# Patient Record
Sex: MALE | Race: WHITE | NOT HISPANIC OR LATINO | Employment: FULL TIME | URBAN - METROPOLITAN AREA
[De-identification: names, ages, dates, MRNs, and addresses within clinical notes are randomized per-mention and may not be internally consistent; named-entity substitution may affect disease eponyms.]

---

## 2017-01-04 ENCOUNTER — TELEPHONE (OUTPATIENT)
Dept: SURGERY | Facility: CLINIC | Age: 52
End: 2017-01-04

## 2017-01-04 NOTE — TELEPHONE ENCOUNTER
Patient is needing to reschedule his surgery to 1/31/17. Called [facility]X Scheduling l/m for return call from Linda or Kamari .

## 2017-01-04 NOTE — TELEPHONE ENCOUNTER
----- Message from Bob Lopez sent at 1/4/2017 10:28 AM CST -----  Pt needs to schedule surgery date that was cancelled. Please call pt regarding this at [phone]

## 2017-01-30 ENCOUNTER — TELEPHONE (OUTPATIENT)
Dept: SURGERY | Facility: CLINIC | Age: 52
End: 2017-01-30

## 2017-01-30 ENCOUNTER — ANESTHESIA EVENT (OUTPATIENT)
Dept: SURGERY | Facility: HOSPITAL | Age: 52
End: 2017-01-30
Payer: COMMERCIAL

## 2017-01-30 NOTE — ANESTHESIA PREPROCEDURE EVALUATION
01/30/2017      Pre-operative evaluation for Procedure(s) (LRB):  REPAIR-HERNIA-UMBILICAL (N/A)    Gee Montez is a 51 y.o. male with PMH chronic neck pain, and frequent PVCs who is being evaluated for the above procedure.     Patient Active Problem List   Diagnosis    Frequent PVCs    Hyperlipidemia    Chronic neck pain       Review of patient's allergies indicates:   Allergen Reactions    Aspirin      Hurts stomach        No current facility-administered medications on file prior to encounter.      Current Outpatient Prescriptions on File Prior to Encounter   Medication Sig Dispense Refill    atorvastatin (LIPITOR) 10 MG tablet Take 1 tablet (10 mg total) by mouth once daily. 90 tablet 11    BYSTOLIC 5 mg Tab Take 1 tablet (5 mg total) by mouth every evening. 90 tablet 11    neomycin-polymyxin-dexamethasone (DEXACINE) 3.5 mg/g-10,000 unit/g-0.1 % Oint ONE APPLICATION TO THE LEFT EYE 4 TIMES A DAY  1    tramadol (ULTRAM) 50 mg tablet TAKE 1 TABLET BY MOUTH EVERY 8 HOURS AS NEEDED PAIN 60 tablet 4       No past surgical history on file.    Social History     Social History    Marital status: Single     Spouse name: N/A    Number of children: N/A    Years of education: N/A     Occupational History    Not on file.     Social History Main Topics    Smoking status: Former Smoker    Smokeless tobacco: Not on file    Alcohol use Yes      Comment: 2-3 drinks on weekend days, 5 drinks per week    Drug use: Not on file    Sexual activity: Not on file     Other Topics Concern    Not on file     Social History Narrative    Lives with partner 27 years. No kids. Works [employer]. Walks dog for exercise.         Vital Signs Range (Last 24H):         CBC: No results for input(s): WBC, RBC, HGB, HCT, PLT, MCV, MCH, MCHC in the last 72 hours.    CMP: No results for input(s): NA, K, CL, CO2, BUN,  CREATININE, GLU, MG, PHOS, CALCIUM, ALBUMIN, PROT, ALKPHOS, ALT, AST, BILITOT in the last 72 hours.    INR  No results for input(s): INR, PROTIME, APTT in the last 72 hours.    Invalid input(s): PT        Diagnostic Studies:      EKG:  Vent. Rate : 054 BPM     Atrial Rate : 054 BPM     P-R Int : 162 ms          QRS Dur : 094 ms      QT Int : 418 ms       P-R-T Axes : 018 -19 051 degrees     QTc Int : 396 ms    Sinus bradycardia  Within normal limits    No previous ECGs available  Confirmed by HAYLEE TOBIN MD ([id number]) on 12/1/2016 9:58:47 AM    2D Echo: none on file           [facility] Anesthesia Evaluation    I have reviewed the Patient Summary Reports.     I have reviewed the Medications.     Review of Systems      Physical Exam  General:  Well nourished, Obesity    Airway/Jaw/Neck:  Airway Findings: Mouth Opening: Normal Tongue: Normal  General Airway Assessment: Adult  Mallampati: II  TM Distance: Normal, at least 6 cm      Dental:  Dental Findings: In tact        Mental Status:  Mental Status Findings:  Cooperative         Anesthesia Plan  Type of Anesthesia, risks & benefits discussed:  Anesthesia Type:  general  Patient's Preference:   Intra-op Monitoring Plan: standard ASA monitors  Intra-op Monitoring Plan Comments:   Post Op Pain Control Plan:   Post Op Pain Control Plan Comments:   Induction:   IV  Beta Blocker:  Patient is on a Beta-Blocker and has received one dose within the past 24 hours (No further documentation required).       Informed Consent: Patient understands risks and agrees with Anesthesia plan.  Questions answered. Anesthesia consent signed with patient.  ASA Score: 2     Day of Surgery Review of History & Physical:            Ready For Surgery From Anesthesia Perspective.

## 2017-01-30 NOTE — TELEPHONE ENCOUNTER
Left  notifying pt that his surgery is scheduled for 1100 on 1/31/17. he needs to arrive to the 2nd floor [facility] in the hospital @ 0900. he should not eat or drink anything after midnight. Post op Appointment reminder mailed.

## 2017-01-31 ENCOUNTER — ANESTHESIA (OUTPATIENT)
Dept: SURGERY | Facility: HOSPITAL | Age: 52
End: 2017-01-31
Payer: COMMERCIAL

## 2017-01-31 ENCOUNTER — SURGERY (OUTPATIENT)
Age: 52
End: 2017-01-31

## 2017-01-31 PROBLEM — K42.9 UMBILICAL HERNIA WITHOUT OBSTRUCTION AND WITHOUT GANGRENE: Status: ACTIVE | Noted: 2017-01-31

## 2017-01-31 PROCEDURE — 27200750 HC INSULATED NEEDLE/ STIMUPLEX: Performed by: STUDENT IN AN ORGANIZED HEALTH CARE EDUCATION/TRAINING PROGRAM

## 2017-01-31 PROCEDURE — 25000003 PHARM REV CODE 250: Performed by: ANESTHESIOLOGY

## 2017-01-31 PROCEDURE — D9220A PRA ANESTHESIA: Mod: ,,, | Performed by: ANESTHESIOLOGY

## 2017-01-31 PROCEDURE — 63600175 PHARM REV CODE 636 W HCPCS: Performed by: ANESTHESIOLOGY

## 2017-01-31 PROCEDURE — 25000003 PHARM REV CODE 250: Performed by: SURGERY

## 2017-01-31 RX ORDER — ONDANSETRON 2 MG/ML
INJECTION INTRAMUSCULAR; INTRAVENOUS
Status: DISCONTINUED | OUTPATIENT
Start: 2017-01-31 | End: 2017-01-31

## 2017-01-31 RX ORDER — ROCURONIUM BROMIDE 10 MG/ML
INJECTION, SOLUTION INTRAVENOUS
Status: DISCONTINUED | OUTPATIENT
Start: 2017-01-31 | End: 2017-01-31

## 2017-01-31 RX ORDER — MIDAZOLAM HYDROCHLORIDE 1 MG/ML
INJECTION, SOLUTION INTRAMUSCULAR; INTRAVENOUS
Status: DISCONTINUED | OUTPATIENT
Start: 2017-01-31 | End: 2017-01-31

## 2017-01-31 RX ORDER — PHENYLEPHRINE HYDROCHLORIDE 10 MG/ML
INJECTION INTRAVENOUS
Status: DISCONTINUED | OUTPATIENT
Start: 2017-01-31 | End: 2017-01-31

## 2017-01-31 RX ORDER — NEOSTIGMINE METHYLSULFATE 1 MG/ML
INJECTION, SOLUTION INTRAVENOUS
Status: DISCONTINUED | OUTPATIENT
Start: 2017-01-31 | End: 2017-01-31

## 2017-01-31 RX ORDER — PROPOFOL 10 MG/ML
VIAL (ML) INTRAVENOUS
Status: DISCONTINUED | OUTPATIENT
Start: 2017-01-31 | End: 2017-01-31

## 2017-01-31 RX ORDER — SODIUM CHLORIDE 9 MG/ML
INJECTION, SOLUTION INTRAVENOUS CONTINUOUS PRN
Status: DISCONTINUED | OUTPATIENT
Start: 2017-01-31 | End: 2017-01-31

## 2017-01-31 RX ORDER — GLYCOPYRROLATE 0.2 MG/ML
INJECTION INTRAMUSCULAR; INTRAVENOUS
Status: DISCONTINUED | OUTPATIENT
Start: 2017-01-31 | End: 2017-01-31

## 2017-01-31 RX ORDER — FENTANYL CITRATE 50 UG/ML
INJECTION, SOLUTION INTRAMUSCULAR; INTRAVENOUS
Status: DISCONTINUED | OUTPATIENT
Start: 2017-01-31 | End: 2017-01-31

## 2017-01-31 RX ADMIN — Medication 2 G: at 11:01

## 2017-01-31 RX ADMIN — ROCURONIUM BROMIDE 50 MG: 10 INJECTION, SOLUTION INTRAVENOUS at 10:01

## 2017-01-31 RX ADMIN — GLYCOPYRROLATE 0.2 MG: 0.2 INJECTION, SOLUTION INTRAMUSCULAR; INTRAVENOUS at 11:01

## 2017-01-31 RX ADMIN — BACITRACIN 50000 UNITS: 50000 INJECTION, POWDER, LYOPHILIZED, FOR SOLUTION INTRAMUSCULAR at 11:01

## 2017-01-31 RX ADMIN — PHENYLEPHRINE HYDROCHLORIDE 200 MCG: 10 INJECTION INTRAVENOUS at 11:01

## 2017-01-31 RX ADMIN — SODIUM CHLORIDE: 0.9 INJECTION, SOLUTION INTRAVENOUS at 10:01

## 2017-01-31 RX ADMIN — ONDANSETRON 4 MG: 2 INJECTION INTRAMUSCULAR; INTRAVENOUS at 12:01

## 2017-01-31 RX ADMIN — PROPOFOL 50 MG: 10 INJECTION, EMULSION INTRAVENOUS at 11:01

## 2017-01-31 RX ADMIN — FENTANYL CITRATE 25 MCG: 50 INJECTION, SOLUTION INTRAMUSCULAR; INTRAVENOUS at 12:01

## 2017-01-31 RX ADMIN — PROPOFOL 200 MG: 10 INJECTION, EMULSION INTRAVENOUS at 10:01

## 2017-01-31 RX ADMIN — NEOSTIGMINE METHYLSULFATE 5 MG: 1 INJECTION INTRAVENOUS at 12:01

## 2017-01-31 RX ADMIN — MIDAZOLAM HYDROCHLORIDE 2 MG: 1 INJECTION, SOLUTION INTRAMUSCULAR; INTRAVENOUS at 10:01

## 2017-01-31 RX ADMIN — FENTANYL CITRATE 150 MCG: 50 INJECTION, SOLUTION INTRAMUSCULAR; INTRAVENOUS at 10:01

## 2017-01-31 RX ADMIN — GLYCOPYRROLATE 0.6 MG: 0.2 INJECTION, SOLUTION INTRAMUSCULAR; INTRAVENOUS at 12:01

## 2017-01-31 NOTE — ANESTHESIA POSTPROCEDURE EVALUATION
"Anesthesia Post Evaluation    Patient: Gee Montez    Procedure(s) Performed: Procedure(s) (LRB):  REPAIR-HERNIA-UMBILICAL (N/A)    Final Anesthesia Type: general  Patient location during evaluation: PACU  Patient participation: Yes- Able to Participate  Level of consciousness: awake and alert  Post-procedure vital signs: reviewed and stable  Pain management: adequate  Airway patency: patent  PONV status at discharge: No PONV  Anesthetic complications: no      Cardiovascular status: hemodynamically stable  Respiratory status: unassisted, spontaneous ventilation and room air  Hydration status: euvolemic  Follow-up not needed.        Visit Vitals    /71    Pulse (!) 57    Temp 36.6 °C (97.8 °F) (Oral)    Resp 15    Ht 5' 10" (1.778 m)    Wt 95.7 kg (210 lb 15.7 oz)    SpO2 99%    BMI 30.27 kg/m2       Pain/Tyree Score: Pain Assessment Performed: Yes (1/31/2017  1:10 PM)  Presence of Pain: complains of pain/discomfort (1/31/2017  1:10 PM)  Pain Rating Prior to Med Admin: 5 (1/31/2017  1:04 PM)  Tyree Score: 10 (1/31/2017  1:10 PM)      "

## 2017-01-31 NOTE — TRANSFER OF CARE
"Anesthesia Transfer of Care Note    Patient: Gee Montez    Procedure(s) Performed: Procedure(s) (LRB):  REPAIR-HERNIA-UMBILICAL (N/A)    Patient location: PACU    Anesthesia Type: general    Transport from OR: Transported from OR on 6-10 L/min O2 by face mask with adequate spontaneous ventilation    Post pain: adequate analgesia    Post assessment: no apparent anesthetic complications    Post vital signs: stable    Level of consciousness: awake    Nausea/Vomiting: no nausea/vomiting    Complications: none          Last vitals:   Visit Vitals    /75 (BP Location: Left arm, Patient Position: Lying, BP Method: Automatic)    Pulse 62    Temp 36.8 °C (98.2 °F) (Oral)    Resp 16    Ht 5' 10" (1.778 m)    Wt 95.7 kg (210 lb 15.7 oz)    SpO2 98%    BMI 30.27 kg/m2     "

## 2017-01-31 NOTE — ANESTHESIA PROCEDURE NOTES
Rectus Sheath Single Shot     Patient location during procedure: OR   Block not for primary anesthetic.  Reason for block: at surgeon's request and post-op pain management   Post-op Pain Location: Abdominal wall pain   Start time: 1/31/2017 10:55 AM  Timeout: 1/31/2017 10:55 AM   End time: 1/31/2017 11:00 AM  Surgery related to: Umbilical hernia repair.   Staffing  Anesthesiologist: MIRZA KIDD  Resident/CRNA: BHAVYA SOUSA  Performed by: resident/CRNA   Preanesthetic Checklist  Completed: patient identified, site marked, surgical consent, pre-op evaluation, timeout performed, IV checked, risks and benefits discussed and monitors and equipment checked  Peripheral Block  Patient position: supine  Prep: ChloraPrep  Patient monitoring: heart rate, cardiac monitor, continuous pulse ox, continuous capnometry and frequent blood pressure checks  Block type: rectus sheath  Laterality: bilateral  Injection technique: single shot  Needle  Needle type: Stimuplex   Needle gauge: 21 G  Needle length: 4 in  Needle localization: ultrasound guidance   -ultrasound image captured on disc.  Assessment  Injection assessment: local visualized surrounding nerve and negative aspiration  Paresthesia pain: none  Heart rate change: no  Slow fractionated injection: yes  Medications:  Bolus administered: 40 mL of 0.25 ropivacaine  Epinephrine added: 3.75 mcg/mL (1/300,000)  Additional Notes  See anesthesia record for continuous monitoring. VSS, patient tolerated procedure well.

## 2017-01-31 NOTE — ANESTHESIA RELEASE NOTE
"Anesthesia Release from PACU Note    Patient: Gee Montez    Procedure(s) Performed: Procedure(s) (LRB):  REPAIR-HERNIA-UMBILICAL (N/A)    Anesthesia type: general    Post pain: Adequate analgesia    Post assessment: no apparent anesthetic complications    Last Vitals:   Visit Vitals    /71    Pulse (!) 57    Temp 36.6 °C (97.8 °F) (Oral)    Resp 15    Ht 5' 10" (1.778 m)    Wt 95.7 kg (210 lb 15.7 oz)    SpO2 99%    BMI 30.27 kg/m2       Post vital signs: stable    Level of consciousness: awake    Nausea/Vomiting: no nausea/no vomiting    Complications: none    Airway Patency: patent    Respiratory: unassisted    Cardiovascular: stable and blood pressure at baseline    Hydration: euvolemic  "

## 2017-02-03 ENCOUNTER — NURSE TRIAGE (OUTPATIENT)
Dept: ADMINISTRATIVE | Facility: CLINIC | Age: 52
End: 2017-02-03

## 2017-02-03 NOTE — TELEPHONE ENCOUNTER
Reason for Disposition   Nursing judgment, per information in Reference    Protocols used: ST NO GUIDELINE AVAILABLE - ADVICE PER [id number]    Patient states he has a tickle in the back of his throat. No complaint of pain, choking or difficulty swallowing. Patient states he has been using cough drops and drinking water to try to clear his throat. Patient wanted to know if it is ok to use cough drops and informed him that it should be okay. Patient advised to call back if he has any new symptoms or difficulties, he verbalized understanding.

## 2017-02-15 ENCOUNTER — OFFICE VISIT (OUTPATIENT)
Dept: SURGERY | Facility: CLINIC | Age: 52
End: 2017-02-15
Payer: COMMERCIAL

## 2017-02-15 VITALS
SYSTOLIC BLOOD PRESSURE: 125 MMHG | WEIGHT: 210 LBS | HEIGHT: 70 IN | BODY MASS INDEX: 30.06 KG/M2 | DIASTOLIC BLOOD PRESSURE: 83 MMHG | TEMPERATURE: 98 F | HEART RATE: 57 BPM

## 2017-02-15 DIAGNOSIS — Z09 POSTOP CHECK: Primary | ICD-10-CM

## 2017-02-15 PROCEDURE — 99999 PR PBB SHADOW E&M-EST. PATIENT-LVL II: CPT | Mod: PBBFAC,,, | Performed by: SURGERY

## 2017-02-15 PROCEDURE — 99024 POSTOP FOLLOW-UP VISIT: CPT | Mod: S$GLB,,, | Performed by: SURGERY

## 2017-02-15 NOTE — PROGRESS NOTES
HPI:  The patient is status post-umbilical hernia on 1/31/17. He is eating well. The patient denies nausea, fever or problems with the bowels.     PHYSICAL EXAM:  Physical Exam   Abd soft, NTTP, incision healing well    ASSESSMENT:    The patient is doing well after surgery.     PLAN:    Follow up PRN.  Activity: light duty for 4 weeks

## 2017-03-06 DIAGNOSIS — M54.2 CHRONIC NECK PAIN: ICD-10-CM

## 2017-03-06 DIAGNOSIS — G89.29 CHRONIC NECK PAIN: ICD-10-CM

## 2017-03-06 RX ORDER — TRAMADOL HYDROCHLORIDE 50 MG/1
TABLET ORAL
Qty: 60 TABLET | Refills: 0 | Status: CANCELLED | OUTPATIENT
Start: 2017-03-06

## 2017-03-09 DIAGNOSIS — M54.2 CHRONIC NECK PAIN: ICD-10-CM

## 2017-03-09 DIAGNOSIS — G89.29 CHRONIC NECK PAIN: ICD-10-CM

## 2017-03-09 RX ORDER — TRAMADOL HYDROCHLORIDE 50 MG/1
50 TABLET ORAL EVERY 8 HOURS PRN
Qty: 90 TABLET | Refills: 3 | Status: SHIPPED | OUTPATIENT
Start: 2017-03-09 | End: 2017-07-07 | Stop reason: SDUPTHER

## 2017-05-04 ENCOUNTER — TELEPHONE (OUTPATIENT)
Dept: INTERNAL MEDICINE | Facility: CLINIC | Age: 52
End: 2017-05-04

## 2017-05-04 DIAGNOSIS — I49.3 FREQUENT PVCS: Primary | ICD-10-CM

## 2017-05-04 NOTE — TELEPHONE ENCOUNTER
Patient requesting a Prior Authorization for Bystolic. Insurance requiring PA to cover some cost. Callled [employer] [phone] and an automated message came on stating they switched to electronic prior Authorizations. The website given is [email or web address] Please advise.

## 2017-05-04 NOTE — TELEPHONE ENCOUNTER
----- Message from Yee May sent at 5/4/2017  1:41 PM CDT -----  Contact: Patient  The patient's Bystolic is too expensive.  His insurance told him that if his physician would call and give a prior authorization the cost of the Bystolic would go down. Please call [employer] at [phone] for the prior authorization.  The direct physician's line if Dr. Arredondo has any questions is [phone].    Thanks!

## 2017-05-05 ENCOUNTER — TELEPHONE (OUTPATIENT)
Dept: INTERNAL MEDICINE | Facility: CLINIC | Age: 52
End: 2017-05-05

## 2017-05-05 RX ORDER — METOPROLOL SUCCINATE 25 MG/1
25 TABLET, EXTENDED RELEASE ORAL DAILY
Qty: 90 TABLET | Refills: 1 | Status: SHIPPED | OUTPATIENT
Start: 2017-05-05 | End: 2017-07-07

## 2017-05-05 NOTE — TELEPHONE ENCOUNTER
Hi, please call and let him know that his insurance does not cover bystolic this time. I recommend he try metoprolol instead, which is a good alternative, and please schedule a return visit with me, he is due to see me back.  Thank you, Omar Arredondo

## 2017-05-05 NOTE — TELEPHONE ENCOUNTER
----- Message from José Miguel Oropeza sent at 5/5/2017  3:53 PM CDT -----  Contact: self [phone]  Patient is returning the office call . Please advise , Thanks !

## 2017-05-05 NOTE — TELEPHONE ENCOUNTER
Spoke with patient, he stated that he already paid for a 90 day supply of Bystolic. The patient thought that we can call in a PA to help reimburse a percentage of the cost for the medication. I called and spoke with a representative at [employer] who made me aware that there are 3 other medications that the patient can try that is similar and covered under his insurance. The names are Atenolol, Carvedilol and Propraolol. Called patient back and spoke to patient explained that he need to contact his insurance company for better clarification of his coverage. So as of now this situation is completed.

## 2017-05-05 NOTE — TELEPHONE ENCOUNTER
----- Message from Erna Ulloa sent at 5/5/2017  3:15 PM CDT -----  Contact: Self/[phone] cell   Pt said that he is calling in regards to needing to speak with the nurse pt stated that he has taken metoprolol succinate (TOPROL-XL) 25 MG 24 hr tablet before pt stated that he was given this medication about 3 -4 years by Dr.Chris Galaviz and is caused his pulse to be very low and this medication also caused pt's limb to fall asleep that is why pt was trying to see if the doctor could send in a prior authorization for the Bystolic since he has tried the Metoprolol and he had these side effects from the medication pt stated that the The direct physician's line for [employer] if Dr. Arredondo has any questions is [phone].. Please call and advise          Thank you

## 2017-07-07 ENCOUNTER — HOSPITAL ENCOUNTER (OUTPATIENT)
Dept: RADIOLOGY | Facility: HOSPITAL | Age: 52
Discharge: HOME | End: 2017-07-07
Attending: INTERNAL MEDICINE
Payer: COMMERCIAL

## 2017-07-07 ENCOUNTER — OFFICE VISIT (OUTPATIENT)
Dept: INTERNAL MEDICINE | Facility: CLINIC | Age: 52
End: 2017-07-07
Payer: COMMERCIAL

## 2017-07-07 VITALS
SYSTOLIC BLOOD PRESSURE: 124 MMHG | HEART RATE: 60 BPM | HEIGHT: 65 IN | BODY MASS INDEX: 35.4 KG/M2 | OXYGEN SATURATION: 94 % | DIASTOLIC BLOOD PRESSURE: 76 MMHG | TEMPERATURE: 99 F | WEIGHT: 212.5 LBS

## 2017-07-07 DIAGNOSIS — E78.5 HYPERLIPIDEMIA, UNSPECIFIED HYPERLIPIDEMIA TYPE: ICD-10-CM

## 2017-07-07 DIAGNOSIS — M54.50 CHRONIC LOW BACK PAIN WITHOUT SCIATICA, UNSPECIFIED BACK PAIN LATERALITY: ICD-10-CM

## 2017-07-07 DIAGNOSIS — G89.29 CHRONIC LOW BACK PAIN WITHOUT SCIATICA, UNSPECIFIED BACK PAIN LATERALITY: ICD-10-CM

## 2017-07-07 DIAGNOSIS — M54.2 CHRONIC NECK PAIN: ICD-10-CM

## 2017-07-07 DIAGNOSIS — Z00.00 ROUTINE GENERAL MEDICAL EXAMINATION AT A HEALTH CARE FACILITY: Primary | ICD-10-CM

## 2017-07-07 DIAGNOSIS — I49.3 FREQUENT PVCS: ICD-10-CM

## 2017-07-07 DIAGNOSIS — G89.29 CHRONIC NECK PAIN: ICD-10-CM

## 2017-07-07 DIAGNOSIS — Z23 NEED FOR DIPHTHERIA-TETANUS-PERTUSSIS (TDAP) VACCINE: ICD-10-CM

## 2017-07-07 PROCEDURE — 99396 PREV VISIT EST AGE 40-64: CPT | Mod: S$GLB,,, | Performed by: INTERNAL MEDICINE

## 2017-07-07 PROCEDURE — 72040 X-RAY EXAM NECK SPINE 2-3 VW: CPT | Mod: 26,,, | Performed by: RADIOLOGY

## 2017-07-07 PROCEDURE — 90715 TDAP VACCINE 7 YRS/> IM: CPT | Mod: S$GLB,,, | Performed by: INTERNAL MEDICINE

## 2017-07-07 PROCEDURE — 72040 X-RAY EXAM NECK SPINE 2-3 VW: CPT | Mod: TC

## 2017-07-07 PROCEDURE — 90471 IMMUNIZATION ADMIN: CPT | Mod: S$GLB,,, | Performed by: INTERNAL MEDICINE

## 2017-07-07 PROCEDURE — 72100 X-RAY EXAM L-S SPINE 2/3 VWS: CPT | Mod: TC

## 2017-07-07 PROCEDURE — 72100 X-RAY EXAM L-S SPINE 2/3 VWS: CPT | Mod: 26,,, | Performed by: RADIOLOGY

## 2017-07-07 PROCEDURE — 99999 PR PBB SHADOW E&M-EST. PATIENT-LVL IV: CPT | Mod: PBBFAC,,, | Performed by: INTERNAL MEDICINE

## 2017-07-07 RX ORDER — NEBIVOLOL HYDROCHLORIDE 5 MG/1
5 TABLET ORAL NIGHTLY
Qty: 90 TABLET | Refills: 11 | Status: SHIPPED | OUTPATIENT
Start: 2017-07-07 | End: 2018-08-13 | Stop reason: SDUPTHER

## 2017-07-07 RX ORDER — ATORVASTATIN CALCIUM 10 MG/1
10 TABLET, FILM COATED ORAL DAILY
Qty: 90 TABLET | Refills: 11 | Status: SHIPPED | OUTPATIENT
Start: 2017-07-07 | End: 2018-08-13 | Stop reason: SDUPTHER

## 2017-07-07 RX ORDER — TRAMADOL HYDROCHLORIDE 50 MG/1
50 TABLET ORAL EVERY 8 HOURS PRN
Qty: 90 TABLET | Refills: 5 | Status: SHIPPED | OUTPATIENT
Start: 2017-07-07 | End: 2018-01-18 | Stop reason: SDUPTHER

## 2017-07-07 NOTE — PROGRESS NOTES
Subjective:       Patient ID: Gee Montez is a 51 y.o. male.    Chief Complaint: Annual Exam    Did well with umb hernia.    No complaints.    Regarding his PVCs, bystolic has worked with to prevent intrusive palpitations. Has tried toprol/metoprolol and cause heart rate to go too low. Has tried 1-2 days off bystolic and intrusive symptoms of palps return.    Chronic back and neck symptoms are stable.  Pt denies f/c/ns/wt loss, no fecal incontinence or difficulty with retained urine, no hematuria, no dysuria, no perianal anesthesia, no focal weakness or loss of sensation in feet or legs.          Review of Systems   Constitutional: Negative for appetite change and unexpected weight change.   Respiratory: Negative for chest tightness and shortness of breath.    Cardiovascular: Negative for chest pain.   Gastrointestinal: Negative for abdominal distention and abdominal pain.   Genitourinary: Negative for difficulty urinating, scrotal swelling and testicular pain.   Musculoskeletal: Positive for back pain and neck pain. Negative for arthralgias and neck stiffness.   Skin:        No lesions       Objective:      Physical Exam   Constitutional: He is oriented to person, place, and time. He appears well-developed and well-nourished. No distress.   HENT:   Head: Normocephalic and atraumatic.   Eyes: No scleral icterus.   Neck: Normal range of motion. No thyromegaly present.   Cardiovascular: Normal rate, regular rhythm and normal heart sounds.  Exam reveals no gallop and no friction rub.    No murmur heard.  Pulmonary/Chest: Effort normal and breath sounds normal. No respiratory distress. He has no wheezes. He has no rales.   Abdominal: Soft. Bowel sounds are normal. He exhibits no distension and no mass. There is no tenderness. There is no rebound and no guarding.   Musculoskeletal: Normal range of motion. He exhibits no edema.   nl lower extrem strength/sense/DTRs    nl upper extrem strength/sense/DTRs    R shoulder  normal rom and no rotator cuff weakness   Lymphadenopathy:     He has no cervical adenopathy.   Neurological: He is alert and oriented to person, place, and time.   Skin: No lesion noted.   Psychiatric: He has a normal mood and affect. Thought content normal.       Assessment:       1. Chronic low back pain without sciatica, unspecified back pain laterality    2. Frequent PVCs    3. Hyperlipidemia, unspecified hyperlipidemia type    4. Chronic neck pain    5. Need for diphtheria-tetanus-pertussis (Tdap) vaccine        Plan:       Gee was seen today for annual exam.    Diagnoses and all orders for this visit:    Chronic low back pain without sciatica, unspecified back pain laterality  -     tramadol (ULTRAM) 50 mg tablet; Take 1 tablet (50 mg total) by mouth every 8 (eight) hours as needed for Pain.  -     X-Ray Lumbar Spine Ap And Lateral; Future  -     Ambulatory consult to Physical Therapy    Frequent PVCs  -     BYSTOLIC 5 mg Tab; Take 1 tablet (5 mg total) by mouth every evening.  -     TSH; Future  Will work on PA since did not tolerate metoprolol. Discussed trying off med, which does not sound like a good option since symptoms returned off it for 1-2 days.    Hyperlipidemia, unspecified hyperlipidemia type  -     atorvastatin (LIPITOR) 10 MG tablet; Take 1 tablet (10 mg total) by mouth once daily.  -     Lipid panel; Future  -     Comprehensive metabolic panel; Future    Chronic neck pain  -     tramadol (ULTRAM) 50 mg tablet; Take 1 tablet (50 mg total) by mouth every 8 (eight) hours as needed for Pain.  -     X-Ray Cervical Spine AP And Lateral; Future  -     Ambulatory consult to Physical Therapy  Stable symptoms, see iof PT can help aleviate    Need for diphtheria-tetanus-pertussis (Tdap) vaccine  -     (In Office Administered) Tdap Vaccine    Health Maintenance       Date Due Completion Date    Influenza Vaccine 08/01/2017 11/29/2016    Lipid Panel 07/07/2022 7/7/2017    Colonoscopy 02/19/2025  2/19/2015 (Done)    Override on 2/19/2015: Done    TETANUS VACCINE 07/07/2027 7/7/2017    Override on 6/9/2009: Done        Return in about 6 months (around 1/7/2018).

## 2017-07-14 ENCOUNTER — LAB VISIT (OUTPATIENT)
Dept: LAB | Facility: HOSPITAL | Age: 52
End: 2017-07-14
Attending: INTERNAL MEDICINE
Payer: COMMERCIAL

## 2017-07-14 ENCOUNTER — TELEPHONE (OUTPATIENT)
Dept: INTERNAL MEDICINE | Facility: CLINIC | Age: 52
End: 2017-07-14

## 2017-07-14 DIAGNOSIS — E87.5 SERUM POTASSIUM ELEVATED: Primary | ICD-10-CM

## 2017-07-14 DIAGNOSIS — E87.5 SERUM POTASSIUM ELEVATED: ICD-10-CM

## 2017-07-14 LAB
ANION GAP SERPL CALC-SCNC: 6 MMOL/L
BUN SERPL-MCNC: 18 MG/DL
CALCIUM SERPL-MCNC: 9.5 MG/DL
CHLORIDE SERPL-SCNC: 105 MMOL/L
CO2 SERPL-SCNC: 30 MMOL/L
CREAT SERPL-MCNC: 0.8 MG/DL
EST. GFR  (AFRICAN AMERICAN): >60 ML/MIN/1.73 M^2
EST. GFR  (NON AFRICAN AMERICAN): >60 ML/MIN/1.73 M^2
GLUCOSE SERPL-MCNC: 83 MG/DL
POTASSIUM SERPL-SCNC: 4.8 MMOL/L
SODIUM SERPL-SCNC: 141 MMOL/L

## 2017-07-14 PROCEDURE — 80048 BASIC METABOLIC PNL TOTAL CA: CPT

## 2017-07-14 PROCEDURE — 36415 COLL VENOUS BLD VENIPUNCTURE: CPT

## 2017-07-14 NOTE — TELEPHONE ENCOUNTER
Hi, please call him --   His potassium blood work from last week was slightly elevated, this may be a lab error, I recommend a recheck to make sure.    The rest of his blood work was ok --  normal cholesterol level -- please continue current dose of cholesterol medicine.    The spine xrays showed arthritis of the cervical spine and lower lumbar spine, as was expected.    Let me know if patient has any questions.  Thank you, Omar Arredondo

## 2018-01-18 ENCOUNTER — HOSPITAL ENCOUNTER (OUTPATIENT)
Dept: RADIOLOGY | Facility: OTHER | Age: 53
Discharge: HOME | End: 2018-01-18
Attending: FAMILY MEDICINE
Payer: COMMERCIAL

## 2018-01-18 ENCOUNTER — OFFICE VISIT (OUTPATIENT)
Dept: URGENT CARE | Facility: CLINIC | Age: 53
End: 2018-01-18
Payer: COMMERCIAL

## 2018-01-18 VITALS
HEIGHT: 65 IN | DIASTOLIC BLOOD PRESSURE: 82 MMHG | WEIGHT: 212 LBS | HEART RATE: 63 BPM | TEMPERATURE: 99 F | SYSTOLIC BLOOD PRESSURE: 128 MMHG | RESPIRATION RATE: 18 BRPM | BODY MASS INDEX: 35.32 KG/M2 | OXYGEN SATURATION: 99 %

## 2018-01-18 DIAGNOSIS — N20.0 KIDNEY STONE: ICD-10-CM

## 2018-01-18 DIAGNOSIS — R10.9 ABDOMINAL PAIN, UNSPECIFIED ABDOMINAL LOCATION: ICD-10-CM

## 2018-01-18 DIAGNOSIS — R10.9 ABDOMINAL PAIN, UNSPECIFIED ABDOMINAL LOCATION: Primary | ICD-10-CM

## 2018-01-18 LAB
BILIRUB UR QL STRIP: NEGATIVE
GLUCOSE UR QL STRIP: NEGATIVE
KETONES UR QL STRIP: NEGATIVE
LEUKOCYTE ESTERASE UR QL STRIP: NEGATIVE
PH, POC UA: 5.5 (ref 5–8)
POC BLOOD, URINE: POSITIVE
POC NITRATES, URINE: NEGATIVE
PROT UR QL STRIP: NEGATIVE
SP GR UR STRIP: 1.01 (ref 1–1.03)
UROBILINOGEN UR STRIP-ACNC: NORMAL (ref 0.3–2.2)

## 2018-01-18 PROCEDURE — 99214 OFFICE O/P EST MOD 30 MIN: CPT | Mod: 25,S$GLB,, | Performed by: FAMILY MEDICINE

## 2018-01-18 PROCEDURE — 74176 CT ABD & PELVIS W/O CONTRAST: CPT | Mod: 26,,, | Performed by: RADIOLOGY

## 2018-01-18 PROCEDURE — 81003 URINALYSIS AUTO W/O SCOPE: CPT | Mod: QW,S$GLB,, | Performed by: FAMILY MEDICINE

## 2018-01-18 PROCEDURE — 74176 CT ABD & PELVIS W/O CONTRAST: CPT | Mod: TC

## 2018-01-18 RX ORDER — TRAMADOL HYDROCHLORIDE 50 MG/1
50 TABLET ORAL EVERY 6 HOURS PRN
Qty: 20 TABLET | Refills: 0 | Status: SHIPPED | OUTPATIENT
Start: 2018-01-18 | End: 2018-01-23 | Stop reason: SDUPTHER

## 2018-01-18 NOTE — PATIENT INSTRUCTIONS
Kidney Stone (with Pain)    The sharp cramping pain on either side of your lower back and nausea/vomiting that you have are because of a small stone that has formed in the kidney. It is now passing down a narrow tube (ureter) on its way to your bladder. Once the stone reaches your bladder, the pain will often stop. But it may come back as the stone continues to pass out of the bladder and through the urethra. The stone may pass in your urine stream in one piece. The size may be 1/16 inch to 1/4 inch (1 mm to 6 mm). Or, the stone may break up into deepa fragments that you may not even notice.  Once you have had a kidney stone, you are at risk of getting another one in the future. There are 4 types of kidney stones. Eighty percent are calcium stones--mostly calcium oxalate but also some with calcium phosphate. The other 3 types include uric acid stones, struvite stones (from a preceding infection), and rarely, cystine stones.  Most stones will pass on their own, but may take from a few hours to a few days. Sometimes the stone is too large to pass by itself. In that case, the healthcare provider will need to use other ways to remove the stone. These techniques include:  · Lithotripsy. This uses ultrasound waves to break up the stone.  · Ureteroscopy. This pushes a basket-like instrument through the urethra and bladder and into the ureter to pull out the stone.  · Various types of direct surgery through the skin  Home care  The following are general care guidelines:  · Drink plenty of fluids. This means at least 12, 8-ounce glasses of fluid--mostly water--a day.  · Each time you urinate, do so in a jar. Pour the urine from the jar through the strainer and into the toilet. Continue doing this until 24 hours after your pain stops. By then, if there was a kidney stone, it should pass from your bladder. Some stones dissolve into sand-like particles and pass right through the strainer. In that case, you wont ever see a  stone.  · Save any stone that you find in the strainer and bring it to your healthcare provider to look at. It may be possible to stop certain types of stones from forming. For this reason, it is important to know what kind of stone you have.  · Try to stay as active as possible. This will help the stone pass. Don't stay in bed unless your pain keeps you from getting up. You may notice a red, pink, or brown color to your urine. This is normal while passing a kidney stone.  · If you develop pain, you may take ibuprofen or naproxen for pain, unless another medicine was prescribed. If you have chronic liver or kidney disease, talk with your healthcare provider before taking these medicines. Also talk with your provider if you've had a stomach ulcer or GI bleeding.  Preventing stones  Each year for the next 5 to 7 years, you are at risk that a new stone will form. Your risk is a 50% chance over this time period. The risk is higher if you have a family history of kidney stones or have certain chronic illnesses like hypertension, obesity, or diabetes. But you can make changes to your lifestyle and diet that can lower your risk for another stone.  Most kidney stones are made of calcium. The following is advice for preventing another calcium stone. If you dont know the type of stone you have, follow this advice until the cause of your stone is found.  Things that help:  · The most important thing you can do is to drink plenty of fluids each day. See home care above.   · Eat foods that contain phytates. These include wheat, rice, rye, barley, and beans. Phytates are substances that may lower your risk for any type of stone to form.  · Eat more fruits and vegetables. Choose those that are high in potassium.  · Eat foods high in natural citrate like fruit and low-sugar fruit juices.  · Having too little calcium in your diet can put you at risk for calcium kidney stones. Eat a normal amount of calcium in your diet and  talk with your healthcare provider if you are taking calcium supplements. Cutting back on your calcium intake may raise your risk. New research shows that eating calcium-rich and oxalate-rich foods together lowers your risk for stones by binding the minerals in the stomach and intestines before they can reach the kidneys.    · Limit salt intake to 2 grams (1 teaspoon) per day. Use limited amounts when cooking, and dont add salt at the table. Processed and canned foods are usually high in salt.   · Spinach, rhubarb, peanuts, cashews, almonds, grapefruit, and grapefruit juice are all high oxalate foods. You should limit how much of these you eat. Or eat them with calcium-rich foods. These include dairy products, dark leafy greens, soy products, and calcium-enriched foods.  · Reducing the amount of animal meat and high protein foods in your diet may lower your risk for uric acid stones.  · Avoid excess sugar (sucrose) and fructose (sweetener in many soft drinks) in your diet.   · If you take vitamin C as a supplement, don't take more than 1,000 mg a day.  · A dietitian or your healthcare provider can give you information about changes in your diet that will help prevent more kidney stones from forming.  Follow-up care  Follow up with your healthcare provider, or as advised, if the pain lasts more than 48 hours. Talk with your provider about urine and blood tests to find out the cause of your stone. If you had an X-ray, CT scan, or other diagnostic test, you will be told of any new findings that may affect your care.  Call 911  Call 911 if you have any of these:  · Weakness, dizziness, or fainting  When to seek medical advice  Call your healthcare provider right away if any of these occur:  · Pain that is not controlled by the medicine given  · Repeated vomiting or unable to keep down fluids  · Fever of 100.4ºF (38ºC) or higher, or as directed by your healthcare provider  · Passage of solid red or brown urine (can't  see through it) or urine with lots of blood clots  · Foul-smelling or cloudy urine  · Unable to pass urine for 8 hours and increasing bladder pressure  Date Last Reviewed: 10/1/2016  © 2196-3717 [employer]. [address] [place] [address], [place] [address]. All rights reserved. This information is not intended as a substitute for professional medical care. Always follow your healthcare professional's instructions.

## 2018-01-18 NOTE — LETTER
January 18, 2018      [facility]  [address] [address]  Phone: [phone]  Fax: [phone]       Patient: Gee Montez   YOB: 1965  Date of Visit: 01/18/2018    To Whom It May Concern:    Linwood Montez  was at [facility] on 01/18/2018. She may return to work/school on 01/23/2018 with no restrictions. If you have any questions or concerns, or if I can be of further assistance, please do not hesitate to contact me.    Sincerely,            Paulo Kohler MD

## 2018-01-18 NOTE — PROGRESS NOTES
"Subjective:       Patient ID: Gee Montez is a 52 y.o. male.    Vitals:  height is 5' 5" (1.651 m) and weight is 96.2 kg (212 lb). His oral temperature is 99.2 °F (37.3 °C). His blood pressure is 128/82 and his pulse is 63. His respiration is 18 and oxygen saturation is 99%.     Chief Complaint: Abdominal Pain    Patient states he been having some abdominal pain for the last 2 days. Patient states he have some lower back pain.      Abdominal Pain   This is a new problem. The current episode started in the past 7 days. The problem occurs constantly. The problem has been gradually worsening. The pain is located in the RUQ. The pain is at a severity of 4/10. The pain is mild. The quality of the pain is aching. Pain radiation: right lower back. Associated symptoms include frequency. Pertinent negatives include no constipation, diarrhea, dysuria, fever, hematochezia, melena, nausea or vomiting. Nothing aggravates the pain. Relieved by: lying down on the right side. Treatments tried: lemon juice and water. The treatment provided mild relief. There is no history of abdominal surgery, gallstones or GERD. Patient's medical history does not include kidney stones and UTI.     Review of Systems   Constitution: Negative for chills and fever.   Cardiovascular: Negative for chest pain.   Respiratory: Negative for shortness of breath.    Musculoskeletal: Positive for back pain.   Gastrointestinal: Positive for abdominal pain. Negative for constipation, diarrhea, hematochezia, melena, nausea and vomiting.   Genitourinary: Positive for flank pain, frequency and urgency. Negative for dysuria.       Objective:      Physical Exam   Constitutional: He appears well-developed and well-nourished. He appears distressed (mildly).   HENT:   Head: Normocephalic and atraumatic.   Cardiovascular: Normal rate, regular rhythm and normal heart sounds.    Pulmonary/Chest: Breath sounds normal. No respiratory distress.   Abdominal: Soft. Normal " appearance, normal aorta and bowel sounds are normal. There is no hepatosplenomegaly, splenomegaly or hepatomegaly. There is generalized tenderness (mild generalized). No hernia.   Nursing note and vitals reviewed.      Assessment:       1. Abdominal pain, unspecified abdominal location    2. Kidney stone        Plan:         Abdominal pain, unspecified abdominal location  -     POCT Urinalysis, Dipstick, Automated, W/O Scope  -     CT Renal Stone Study ABD Pelvis WO; Future; Expected date: 01/18/2018    Kidney stone  -     traMADol (ULTRAM) 50 mg tablet; Take 1 tablet (50 mg total) by mouth every 6 (six) hours as needed for Pain.  Dispense: 20 tablet; Refill: 0  -     Ambulatory referral to Urology

## 2018-01-23 ENCOUNTER — OFFICE VISIT (OUTPATIENT)
Dept: INTERNAL MEDICINE | Facility: CLINIC | Age: 53
End: 2018-01-23
Payer: COMMERCIAL

## 2018-01-23 ENCOUNTER — IMMUNIZATION (OUTPATIENT)
Dept: INTERNAL MEDICINE | Facility: CLINIC | Age: 53
End: 2018-01-23
Payer: COMMERCIAL

## 2018-01-23 ENCOUNTER — TELEPHONE (OUTPATIENT)
Dept: INTERNAL MEDICINE | Facility: CLINIC | Age: 53
End: 2018-01-23

## 2018-01-23 VITALS
DIASTOLIC BLOOD PRESSURE: 78 MMHG | OXYGEN SATURATION: 97 % | WEIGHT: 225.31 LBS | HEIGHT: 65 IN | BODY MASS INDEX: 37.54 KG/M2 | HEART RATE: 58 BPM | SYSTOLIC BLOOD PRESSURE: 110 MMHG

## 2018-01-23 DIAGNOSIS — M54.50 CHRONIC LOW BACK PAIN WITHOUT SCIATICA, UNSPECIFIED BACK PAIN LATERALITY: ICD-10-CM

## 2018-01-23 DIAGNOSIS — M54.2 CHRONIC NECK PAIN: ICD-10-CM

## 2018-01-23 DIAGNOSIS — G89.29 CHRONIC LOW BACK PAIN WITHOUT SCIATICA, UNSPECIFIED BACK PAIN LATERALITY: ICD-10-CM

## 2018-01-23 DIAGNOSIS — G89.29 CHRONIC NECK PAIN: ICD-10-CM

## 2018-01-23 DIAGNOSIS — N20.0 KIDNEY STONE: ICD-10-CM

## 2018-01-23 PROCEDURE — 90686 IIV4 VACC NO PRSV 0.5 ML IM: CPT | Mod: S$GLB,,, | Performed by: INTERNAL MEDICINE

## 2018-01-23 PROCEDURE — 99214 OFFICE O/P EST MOD 30 MIN: CPT | Mod: S$GLB,,, | Performed by: INTERNAL MEDICINE

## 2018-01-23 PROCEDURE — 99999 PR PBB SHADOW E&M-EST. PATIENT-LVL III: CPT | Mod: PBBFAC,,, | Performed by: INTERNAL MEDICINE

## 2018-01-23 PROCEDURE — 90471 IMMUNIZATION ADMIN: CPT | Mod: S$GLB,,, | Performed by: INTERNAL MEDICINE

## 2018-01-23 RX ORDER — TRAMADOL HYDROCHLORIDE 50 MG/1
50 TABLET ORAL EVERY 8 HOURS PRN
Qty: 90 TABLET | Refills: 2 | Status: SHIPPED | OUTPATIENT
Start: 2018-01-23 | End: 2018-04-27 | Stop reason: SDUPTHER

## 2018-01-23 NOTE — TELEPHONE ENCOUNTER
----- Message from June Fernández sent at 1/22/2018 11:51 AM CST -----  Contact: Patient [phone]  Sooner appointment than the  can schedule.  Did you offer to schedule the next available appointment and put the patient on the wait list?:    When is the first available appointment: 03/13/2018  What is the nature of the appointment: 6 month follow up    Comment: Patient had appt with on 01/18/2018 which was reschedule to 01/23/2018. Patient states that he is not available on tomorrow. Please reschedule the appt    Please call and advise.    Thank You

## 2018-01-23 NOTE — PROGRESS NOTES
Subjective:       Patient ID: Gee Montez is a 52 y.o. male.    Chief Complaint: Follow-up    One week ago tightness R flank commenced. Was pulsating in nature. In UC seen for these symptoms,  doctor ordered a CT scan which showed a possible stone. Stone pain has completely resolved, has taken some herbals to help.    Has not done PT yet for neck and back, was waiting to hear from me first after his xrays 6 months ago.    Patient is here for followup for chronic conditions as well..        Review of Systems   Constitutional: Negative for appetite change and unexpected weight change.   Respiratory: Negative for chest tightness and shortness of breath.    Cardiovascular: Negative for chest pain.   Gastrointestinal: Negative for abdominal distention, abdominal pain, nausea and vomiting.   Genitourinary: Negative for difficulty urinating, hematuria, scrotal swelling and testicular pain.   Musculoskeletal: Positive for back pain and neck pain. Negative for arthralgias and neck stiffness.   Skin:        No lesions       Objective:      Physical Exam   Constitutional: He is oriented to person, place, and time. He appears well-developed and well-nourished. No distress.   HENT:   Head: Normocephalic and atraumatic.   Eyes: No scleral icterus.   Neck: Normal range of motion. No thyromegaly present.   Cardiovascular: Normal rate, regular rhythm and normal heart sounds.  Exam reveals no gallop and no friction rub.    No murmur heard.  Pulmonary/Chest: Effort normal and breath sounds normal. No respiratory distress. He has no wheezes. He has no rales.   Abdominal: Soft. Bowel sounds are normal. He exhibits no distension and no mass. There is no tenderness. There is no rebound and no guarding.   No CVAT   Musculoskeletal: Normal range of motion. He exhibits no edema.   nl lower extrem strength/sense/DTRs    nl upper extrem strength/sense/DTRs    R shoulder normal rom and no rotator cuff weakness   Lymphadenopathy:     He has  no cervical adenopathy.   Neurological: He is alert and oriented to person, place, and time.   Skin: No lesion noted.   Psychiatric: He has a normal mood and affect. Thought content normal.       Assessment:       1. Kidney stone    2. Chronic low back pain without sciatica, unspecified back pain laterality    3. Chronic neck pain        Plan:       Gee was seen today for follow-up.    Diagnoses and all orders for this visit:    Kidney stone  Symptoms have resolved, he does do a good job hydrating and will work a bit more even.    Chronic low back pain without sciatica, unspecified back pain laterality  -     traMADol (ULTRAM) 50 mg tablet; Take 1 tablet (50 mg total) by mouth every 8 (eight) hours as needed for Pain.  -     Ambulatory consult to Physical Therapy    Chronic neck pain  -     traMADol (ULTRAM) 50 mg tablet; Take 1 tablet (50 mg total) by mouth every 8 (eight) hours as needed for Pain.  -     Ambulatory consult to Physical Therapy        Health Maintenance       Date Due Completion Date    Lipid Panel 07/07/2022 7/7/2017    Colonoscopy 02/19/2025 2/19/2015 (Done)    Override on 2/19/2015: Done    TETANUS VACCINE 07/07/2027 7/7/2017    Override on 6/9/2009: Done          Follow-up in about 6 months (around 7/23/2018).

## 2018-01-24 PROBLEM — N20.0 KIDNEY STONE: Status: ACTIVE | Noted: 2018-01-24

## 2018-04-10 ENCOUNTER — PATIENT MESSAGE (OUTPATIENT)
Dept: INTERNAL MEDICINE | Facility: CLINIC | Age: 53
End: 2018-04-10

## 2018-04-10 DIAGNOSIS — M54.50 CHRONIC LOW BACK PAIN WITHOUT SCIATICA, UNSPECIFIED BACK PAIN LATERALITY: ICD-10-CM

## 2018-04-10 DIAGNOSIS — G89.29 CHRONIC LOW BACK PAIN WITHOUT SCIATICA, UNSPECIFIED BACK PAIN LATERALITY: ICD-10-CM

## 2018-04-10 DIAGNOSIS — G89.29 CHRONIC NECK PAIN: ICD-10-CM

## 2018-04-10 DIAGNOSIS — M54.2 CHRONIC NECK PAIN: ICD-10-CM

## 2018-04-25 ENCOUNTER — CLINICAL SUPPORT (OUTPATIENT)
Dept: REHABILITATION | Facility: HOSPITAL | Age: 53
End: 2018-04-25
Attending: INTERNAL MEDICINE
Payer: COMMERCIAL

## 2018-04-25 DIAGNOSIS — M54.2 NECK PAIN: ICD-10-CM

## 2018-04-25 DIAGNOSIS — G89.29 CHRONIC BILATERAL LOW BACK PAIN WITHOUT SCIATICA: ICD-10-CM

## 2018-04-25 DIAGNOSIS — M54.50 CHRONIC BILATERAL LOW BACK PAIN WITHOUT SCIATICA: ICD-10-CM

## 2018-04-25 PROCEDURE — 97110 THERAPEUTIC EXERCISES: CPT | Mod: PO

## 2018-04-25 PROCEDURE — 97161 PT EVAL LOW COMPLEX 20 MIN: CPT | Mod: PO

## 2018-04-25 NOTE — PROGRESS NOTES
OUTPATIENT PHYSICAL THERAPY   PATIENT EVALUATION        Name: Gee Montez  Clinic Number: [id number]        Diagnosis:   Encounter Diagnoses   Name Primary?    Neck pain     Chronic bilateral low back pain without sciatica      Physician: Omar Arredondo MD  Treatment Orders: PT Eval and Treat    Past Medical History:   Diagnosis Date    Chronic neck pain     Frequent PVCs     Hyperlipidemia      Current Outpatient Prescriptions   Medication Sig    atorvastatin (LIPITOR) 10 MG tablet Take 1 tablet (10 mg total) by mouth once daily.    BYSTOLIC 5 mg Tab Take 1 tablet (5 mg total) by mouth every evening.    docusate sodium (COLACE) 50 MG capsule Take 1 capsule (50 mg total) by mouth 2 (two) times daily.    traMADol (ULTRAM) 50 mg tablet Take 1 tablet (50 mg total) by mouth every 8 (eight) hours as needed for Pain.     No current facility-administered medications for this visit.      Review of patient's allergies indicates:   Allergen Reactions    Adhes. band-tape-benzalkonium     Aspirin      Hurts stomach         Precautions: standrad    Evaluation Date: 4/25/18  Visit # authorized: 20  Authorization period: 12/31/18  Plan of care Expiration: 6/20/18      Subjective     Onset Date: Chronic neck and back pain  Prior Level of Function: independent  Current level of Function: independent with all activities however increased pain with heavy activities  Social History: Pt works at [employer] lifting and carrying heavy boxes which irritates the pain.      History of Present Illness: Gee is a 52 y.o. male that presents to [facility] secondary to neck and back pain. Gee states his job requires heavy lifting, going up and down ladders, and being on his feet all day. He was also in the [employer] for many years which took a toll on his body. He states his pain has been chronic for 15 or more years. Pt states he has lost weight which has helped with the pain. He reports  "recently he has been too fatigued to exercise when he gets off of work but this used to help. He denies numbness and tingling in upper or lower extremities. His pain is controlled with tramadol.    Imaging: X-ray  Revealed lumbar spine: Mild DJD.  Grade one L4/L5 spondylolisthesis.  The L4/L5 there and L5/S1 disc spaces are significantly narrowed.  Slight narrowing of the L3/L4 disc space also identified.  No fracture or bone destruction.    Cervical spine: 2 views: There is moderate DJD between C3 and C7.  Odontoid prevertebral soft tissues, and posterior elements are intact.  No fracture dislocation bone destruction seen.    Pain: current 6/10, worst 9/10, best 3/10, neck: Aching lumbar:across the low back , constant  Radicular symptoms: none present  Aggravating factors: neck: overhead lifting, lifting heavy objects, turning his head, looking up back: standing up for prolonged time, prolonged walking, lifting heavy object  Easing factors: "popping" of the neck, massage to neck and elbow, handheld massager, tramadol    Pts goals: to reduce pain and increase mgmt of neck pain      No cultural, environmental, or spiritual barriers identified to treatment or learning.      Objective       Posture: significant head forward, rounded shoulder posture, protracted shoulders    Cervical Range of Motion:    Degrees Pain   Flexion 48 Increased pain upon extending from flexion     Extension 60 Pain is severe starting at 40 deg     Right Rotation 80% Slight pain at end range     Left Rotation 80%  slight pain at end range     Right Side Bending 35 Pain in B sides of neck   Left Side Bending 30 Slight pain        Shoulder Range of Motion:   Shoulder Left Right   Flexion WNLs cracking in spine WNLs cracking in spine   Abduction WNL WNL   ER WNL WNL   IR WNL WNL       - Trunk Flexion: 100% no pain  - Trunk Ext: 25% increased back pain  - Trunk Rot R: 100%  - Trunk Rot L: 100%    Good B hip flexion PROM, limited with " ER      Upper extremity strength: 5/5 generally B    Lower Trap 3+/5 Lower Trap 3+/5   Middle Trap 3+/5 Middle Trap 3+/5     Lower extremity ROM WNLs B  Lower extremity strength 5/5 except glutes 4-/5 (glut med/max)      Special Tests cervical:  Distraction Improved pain   Compression + for pain in base of neck, no radicular symptoms       Joint Mobility:    Hypomobility noted at lower cervical spine with downglides and upglides, hypomobile cervicothoracic junction    Palpation:+ TTP B SCM, upper trap, and cervical paraspinals, B lumbar paraspinal    Dermatomes: B upper and lower extremity WNLs    PT Evaluation Completed? Yes  Discussed Plan of Care with patient: Yes    TREATMENT x 15 minutes  Gee received therapeutic exercises to develop strength and endurance, flexibility for 15 minutes including:    Pelvic tilts 20 x 5s  Piriformis stretch 3 x 30s  Chin tucks 20 x 5s  Rows with GTB 2 x 10    NEXT: mechanical cervical traction, core stabilization, hip and lumbar flexibility, and postural strengthening      HEP provided: issued handout from [employer] with above activities  Instructed pt. Regarding: Proper technique with all exercises. Pt demo good understanding of the education provided. Gee demonstrated good return demonstration of activities.      Assessment     Gee is a 52 y.o. male referred to outpatient physical therapy with a medical diagnosis of chronic neck and back pain. Pt demonstrates poor posture, decreased cervical and lumbar ROM, poor neck and core stability, and decreased flexibility. Demonstrates impairments including: limitations as described in the problem list. Pt prognosis is Fair. Positive prognostic factors include motivation to participate in therapy and reduce pain medication. Negative prognostic factors include chronicity of pain, BMI, occupational demands. Pt will benefit from skilled outpatient physical therapy to address the above stated deficits, provide pt/family education,  and to maximize pt's level of independence.     Medical necessity is demonstrated by the following IMPAIRMENTS/PROBLEMS:  weakness, impaired functional mobility, pain, decreased ROM, impaired joint extensibility and impaired muscle length    History  Co-morbidities and personal factors that may impact the plan of care Examination  Body Structures and Functions, activity limitations and participation restrictions that may impact the plan of care    Clinical Presentation   Co-morbidities:   high BMI and hyperlipidemia, kidney stones, chronic neck and back pain        Personal Factors:   no deficits  - Body Regions:   neck  back    Body Systems:    gross symmetry  ROM  strength  gross coordinated movement  motor control  motor learning            Participation Restrictions:   Pain with lifting, twisting, bending, prolonged standing and walking     Activity limitations:   Learning and applying knowledge  no deficits    General Tasks and Commands  no deficits    Communication  no deficits    Mobility  lifting and carrying objects  walking  lifting    Self care  no deficits    Domestic Life  shopping  doing house work (cleaning house, washing dishes, laundry)    Interactions/Relationships  no deficits    Life Areas  employment    Community and Social Life  community life         stable and uncomplicated                      low   high  high Decision Making/ Complexity Score:  low         Rehab Potiential: fair    Anticipated Barriers for physical therapy: none    GOALS: Short Term Goals: 4 weeks    - Pt will have pain-free cervical extension in order to show improved mobility.  - Pt will be able to demonstrate proper lifting from floor to waist of a 10-15# object in order to prevent injury with occupational demands.  - Pt will be consistent and independent with HEP in order to promote carryover between therapy sessions.  - Pt will be able to demonstrate proper sitting posture without therapist cuing in order to decrease  pain and improve positional tolerance.    Long Term Goals: 8 weeks      - Pt will be able to stand >5 hours with back and neck pain at less than a 2/10 in order to promote improved tolerance to work demands.  - Pt will increase periscapular strength by 1 ms grade in order to improve performance of functional activities to increase tolerance for ADL and work activities.  - Pt will increase gluteus gaby and medius strength by 1 ms grade in order to improve performance of functional activities to increase tolerance for ADL and work activities.  -  Pt to be Independent with updated HEP to maintain therapy gains following DC.  -  Pt will report improved management of chronic pain with at least a 25% reduction in back and neck pain since start of therapy.        Plan       Recommended Treatment Plan: Pt will be treated by physical therapy 1-2 times a week for 8 weeks for pt education, HEP, therapeutic exercises, neuromuscular re-education, manual therapy, modalities prn to achieve established goals.  Gee may at times be seen by a PTA as part of the Rehab Team.     Other Recommendations: cervical traction        Therapist: Ashley Holstein, PT    I CERTIFY THE NEED FOR THESE SERVICES FURNISHED UNDER THIS PLAN OF TREATMENT AND WHILE UNDER MY CARE    Physician's comments: ________________________________________________________________________________________________________________________________________________      Physician's Name: ___________________________________

## 2018-04-25 NOTE — PLAN OF CARE
OUTPATIENT PHYSICAL THERAPY   PATIENT EVALUATION        Name: Gee Montez  Clinic Number: [id number]        Diagnosis:   Encounter Diagnoses   Name Primary?    Neck pain     Chronic bilateral low back pain without sciatica      Physician: Omar Arredondo MD  Treatment Orders: PT Eval and Treat    Past Medical History:   Diagnosis Date    Chronic neck pain     Frequent PVCs     Hyperlipidemia      Current Outpatient Prescriptions   Medication Sig    atorvastatin (LIPITOR) 10 MG tablet Take 1 tablet (10 mg total) by mouth once daily.    BYSTOLIC 5 mg Tab Take 1 tablet (5 mg total) by mouth every evening.    docusate sodium (COLACE) 50 MG capsule Take 1 capsule (50 mg total) by mouth 2 (two) times daily.    traMADol (ULTRAM) 50 mg tablet Take 1 tablet (50 mg total) by mouth every 8 (eight) hours as needed for Pain.     No current facility-administered medications for this visit.      Review of patient's allergies indicates:   Allergen Reactions    Adhes. band-tape-benzalkonium     Aspirin      Hurts stomach         Precautions: standrad    Evaluation Date: 4/25/18  Visit # authorized: 20  Authorization period: 12/31/18  Plan of care Expiration: 6/20/18      Subjective     Onset Date: Chronic neck and back pain  Prior Level of Function: independent  Current level of Function: independent with all activities however increased pain with heavy activities  Social History: Pt works at [employer] lifting and carrying heavy boxes which irritates the pain.      History of Present Illness: Gee is a 52 y.o. male that presents to [facility] secondary to neck and back pain. Gee states his job requires heavy lifting, going up and down ladders, and being on his feet all day. He was also in the [employer] for many years which took a toll on his bottom. He states his pain has been chronic for 15 or more years. Pt states he has lost weight which has helped with the pain. He repots  "recently he has been to fatigued to exercise when he gets off of work. He denies numbness and tingling. His pain is controlled with tramadol.    Imaging: X-ray  Revealed lumbar spine: Mild DJD.  Grade one L4/L5 spondylolisthesis.  The L4/L5 there and L5/S1 disc spaces are significantly narrowed.  Slight narrowing of the L3/L4 disc space also identified.  No fracture or bone destruction.    Cervical spine: 2 views: There is moderate DJD between C3 and C7.  Odontoid prevertebral soft tissues, and posterior elements are intact.  No fracture dislocation bone destruction seen.    Pain: current 6/10, worst 9/10, best 3/10, neck: Aching lumbar:across the low back , constant  Radicular symptoms: none present  Aggravating factors: neck: overhead lifting, lifting heavy objects, turning his head, looking up back: standing up for prolonged time, prolonged walking, lifting heavy object  Easing factors: "popping" of the neck, massage to neck and elbow, handheld massager, tramadol    Pts goals: to reduce pain and increase mgmt of neck pain      No cultural, environmental, or spiritual barriers identified to treatment or learning.      Objective       Posture: significant head forward, rounded shoulder posture, protracted shoulders    Cervical Range of Motion:    Degrees Pain   Flexion 48 Increased pain upon extending from flexion     Extension 60 Pain is severe starting at 40 deg     Right Rotation 80% Slight pain at end range     Left Rotation 80%  slight pain at end range     Right Side Bending 35 Pain in B sides of neck   Left Side Bending 30 Slight pain        Shoulder Range of Motion:   Shoulder Left Right   Flexion WNLs cracking in spine WNLs cracking in spine   Abduction WNL WNL   ER WNL WNL   IR WNL WNL       - Trunk Flexion: 100% no pain  - Trunk Ext: 25% increased back pain  - Trunk Rot R: 100%  - Trunk Rot L: 100%    Good B hip flexion PROM, limited with ER      Upper extremity strength: 5/5 generally B    Lower Trap " 3+/5 Lower Trap 3+/5   Middle Trap 3+/5 Middle Trap 3+/5     Lower extremity ROM WNLs B  Lower extremity strength 5/5 except glutes 4-/5 (glut med/max)      Special Tests cervical:  Distraction Improved pain   Compression + for pain in base of neck, no radicular symptoms       Joint Mobility:    Hypomobility noted at lower cervical spine with downglides and upglides, hypomobile cervicothoracic junction    Palpation:+ TTP B SCM, upper trap, and cervical paraspinals, B lumbar paraspinal    Dermatomes: B upper and lower extremity WNLs    PT Evaluation Completed? Yes  Discussed Plan of Care with patient: Yes    TREATMENT x 15 minutes  Gee received therapeutic exercises to develop strength and endurance, flexibility for 15 minutes including:    Pelvic tilts 20 x 5s  Piriformis stretch 3 x 30s  Chin tucks 20 x 5s  Rows with GTB 2 x 10    NEXT: cervical traction, core stabilization, hip ROM, and postural strengthening      HEP provided: issued handout from [employer] with above activities  Instructed pt. Regarding: Proper technique with all exercises. Pt demo good understanding of the education provided. Gee demonstrated good return demonstration of activities.      Assessment     Gee is a 52 y.o. male referred to outpatient physical therapy with a medical diagnosis of chronic neck and back pain. Pt demonstrates poor posture, decreased cervical and lumbar ROM, poor neck and core stability, and decreased flexibility. Demonstrates impairments including: limitations as described in the problem list. Pt prognosis is Fair. Positive prognostic factors include motivation to participate in therapy and reduce pain medication. Negative prognostic factors include chronicity of pain, BMI, occupational demands. Pt will benefit from skilled outpatient physical therapy to address the above stated deficits, provide pt/family education, and to maximize pt's level of independence.     Medical necessity is demonstrated by the  following IMPAIRMENTS/PROBLEMS:  weakness, impaired functional mobility, pain, decreased ROM, impaired joint extensibility and impaired muscle length    History  Co-morbidities and personal factors that may impact the plan of care Examination  Body Structures and Functions, activity limitations and participation restrictions that may impact the plan of care    Clinical Presentation   Co-morbidities:   high BMI and hyperlipidemia, kidney stones, chronic neck and back pain        Personal Factors:   no deficits  - Body Regions:   neck  back    Body Systems:    gross symmetry  ROM  strength  gross coordinated movement  motor control  motor learning            Participation Restrictions:   Pain with lifting, twisting, bending, prolonged standing and walking     Activity limitations:   Learning and applying knowledge  no deficits    General Tasks and Commands  no deficits    Communication  no deficits    Mobility  lifting and carrying objects  walking  lifting    Self care  no deficits    Domestic Life  shopping  doing house work (cleaning house, washing dishes, laundry)    Interactions/Relationships  no deficits    Life Areas  employment    Community and Social Life  community life         stable and uncomplicated                      low   high  high Decision Making/ Complexity Score:  low         Rehab Potiential: fair    Anticipated Barriers for physical therapy: none    GOALS: Short Term Goals: 4 weeks    - Pt will have pain-free cervical extension in order to show improved mobility.  - Pt will be able to demonstrate proper lifting from floor to waist of a 10-15# object in order to prevent injury with occupational demands.  - Pt will be consistent and independent with HEP in order to promote carryover between therapy sessions.  - Pt will be able to demonstrate proper sitting posture without therapist cuing in order to decrease pain and improve positional tolerance.    Long Term Goals: 8 weeks      - Pt will be able  to stand >5 hours with back and neck pain at less than a 2/10 in order to promote improved tolerance to work demands.  - Pt will increase periscapular strength by 1 ms grade in order to improve performance of functional activities to increase tolerance for ADL and work activities.  - Pt will increase gluteus gaby and medius strength by 1 ms grade in order to improve performance of functional activities to increase tolerance for ADL and work activities.  -  Pt to be Independent with updated HEP to maintain therapy gains following DC.  -  Pt will report improved management of chronic pain with at least a 25% reduction in back and neck pain since start of therapy.        Plan       Recommended Treatment Plan: Pt will be treated by physical therapy 1-2 times a week for 8 weeks for pt education, HEP, therapeutic exercises, neuromuscular re-education, manual therapy, modalities prn to achieve established goals.  Gee may at times be seen by a PTA as part of the Rehab Team.     Other Recommendations: cervical traction        Therapist: Ashley Holstein, PT    I CERTIFY THE NEED FOR THESE SERVICES FURNISHED UNDER THIS PLAN OF TREATMENT AND WHILE UNDER MY CARE    Physician's comments: ________________________________________________________________________________________________________________________________________________      Physician's Name: ___________________________________

## 2018-04-27 DIAGNOSIS — G89.29 CHRONIC NECK PAIN: ICD-10-CM

## 2018-04-27 DIAGNOSIS — M54.50 CHRONIC LOW BACK PAIN WITHOUT SCIATICA, UNSPECIFIED BACK PAIN LATERALITY: ICD-10-CM

## 2018-04-27 DIAGNOSIS — M54.2 CHRONIC NECK PAIN: ICD-10-CM

## 2018-04-27 DIAGNOSIS — G89.29 CHRONIC LOW BACK PAIN WITHOUT SCIATICA, UNSPECIFIED BACK PAIN LATERALITY: ICD-10-CM

## 2018-04-27 RX ORDER — TRAMADOL HYDROCHLORIDE 50 MG/1
50 TABLET ORAL EVERY 8 HOURS PRN
Qty: 10 TABLET | Refills: 0 | Status: SHIPPED | OUTPATIENT
Start: 2018-04-27 | End: 2018-05-02

## 2018-04-27 NOTE — TELEPHONE ENCOUNTER
----- Message from Mark Gardner sent at 4/27/2018  3:52 PM CDT -----  Contact: Patient [phone]  Return call   Who called: Luly    I gave him the message from Dr. Borden and he said yes please send the partial refill to;    Pharmacy: [id number] [place] [place] [facility] [address] [facility] [phone] (Phone) [phone] (Fax)    Thank you

## 2018-04-27 NOTE — TELEPHONE ENCOUNTER
Please call patient and see if he needs me to send a few tablets of Tramadol to last him through the weekend until Dr. Arredondo is back on Monday.

## 2018-05-01 DIAGNOSIS — M54.2 CHRONIC NECK PAIN: ICD-10-CM

## 2018-05-01 DIAGNOSIS — G89.29 CHRONIC NECK PAIN: ICD-10-CM

## 2018-05-01 DIAGNOSIS — M54.50 CHRONIC LOW BACK PAIN WITHOUT SCIATICA, UNSPECIFIED BACK PAIN LATERALITY: ICD-10-CM

## 2018-05-01 DIAGNOSIS — G89.29 CHRONIC LOW BACK PAIN WITHOUT SCIATICA, UNSPECIFIED BACK PAIN LATERALITY: ICD-10-CM

## 2018-05-02 ENCOUNTER — CLINICAL SUPPORT (OUTPATIENT)
Dept: REHABILITATION | Facility: HOSPITAL | Age: 53
End: 2018-05-02
Attending: INTERNAL MEDICINE
Payer: COMMERCIAL

## 2018-05-02 DIAGNOSIS — M54.50 CHRONIC BILATERAL LOW BACK PAIN WITHOUT SCIATICA: ICD-10-CM

## 2018-05-02 DIAGNOSIS — M54.2 NECK PAIN: Primary | ICD-10-CM

## 2018-05-02 DIAGNOSIS — G89.29 CHRONIC BILATERAL LOW BACK PAIN WITHOUT SCIATICA: ICD-10-CM

## 2018-05-02 PROCEDURE — 97110 THERAPEUTIC EXERCISES: CPT | Mod: PO

## 2018-05-02 PROCEDURE — 97012 MECHANICAL TRACTION THERAPY: CPT | Mod: PO

## 2018-05-02 RX ORDER — TRAMADOL HYDROCHLORIDE 50 MG/1
50 TABLET ORAL EVERY 8 HOURS PRN
Qty: 90 TABLET | Refills: 2 | Status: SHIPPED | OUTPATIENT
Start: 2018-05-02 | End: 2018-08-08 | Stop reason: SDUPTHER

## 2018-05-02 NOTE — PROGRESS NOTES
Physical Therapy Daily Note     Name: Gee Montez  Clinic Number: [id number]  Diagnosis:   Encounter Diagnoses   Name Primary?    Neck pain Yes    Chronic bilateral low back pain without sciatica      Physician: Omar Arredondo MD  Precautions: standard   Evaluation Date: 4/25/18  Visit # authorized: 2/20  Authorization period: 12/31/18  Plan of care Expiration: 6/20/18  Time In:2:00  Time Out: 3:05  Treatment time: 60    Subjective     Pt reports: increased neck pain after the Eval reporting aggravation of symptoms with various movements particularly with cervical extension. HE also c/o chronic lower back and shoulder pain reporting strenuous work when he was younger as a [employer].  States that he utilizes Tramadol for pain relief.   Pain Scale: Gee rates pain on a scale of 0-10 to be 2 currently.    Objective     Gee received individual therapeutic exercises to develop cervical/postural/lumbarstrength, endurance, ROM, flexibility, posture and core stabilization for 40 minutes including:    Standing ex's  · Corner Pec stretch 3 x 20 sec  · Rows with GTB 2 x 10  · Shoulder ext with GTB 2 x 10  · Brueggers with YTB 2 x 10   · Lower trap lift off against wall x 10  · Middle trap lift off against wall x 10.  · Patient educated in and performs floor<->waist lift of 10# box with good technique.  · ( next visit serratus wall slides)     Supine ex's  · Chin tucks 20 x 5 sec  · Piriformis stretch 3 x 20s  · Pelvic tilt x 10 with 5 sec hold   · Pelvic tilt with hip abd/ER with GTB  · Pelvic tilt with Hip add isometric  · Bridging 1 x 10    Gee received the following manual therapy techniques: Soft tissue Mobilization were applied to the: cervical paraspinals for 5 minutes including:  STM cervical paraspinals/UT    Patient receives moist heat applied to cervical area x 5 minutes for muscle relaxation prior to traction  Patient received mechanical  cervical traction  for pain control with a  static   pull @ 15 lbs, with a 15 degree angle of pull for10 minutes.  Patient reporting good relief initially but did experience (R) UT spasm within 3 minutes after completion of traction that was relieved with STM.     Patient education:  Patient educated to continue with previously issued HEP to tolerance along with updated HEP to include: Shoulder ext with GTB, brueggers with YTB, pelvic tilt with Hip abd/er GTB, Pelvic tilt with Hip add isometric, bridging, lower trap and middle trap lift offs on wall. He was educated to avoid exacerbation of pain with ex's which he voices understanding.      Gee verbalized good understanding of education provided.   No spiritual or educational barriers to learning provided    Assessment   Patient tolerated talib Tx fairly well. Patient presenting with some anxiousness initially due to exacerbation of neck symptoms after eval.  He was able to perform the above ex's with muscular fatigue only. He does require some cues for postural awareness and to decreased the intensity of chin tuck ex. He did experience brief episode of (R) side neck discomfort after mechanical traction that was relieved with STM . Overall , he felt that mechanical traction was helpful and discomfort level was slight post TX. Good performance of floor<->waist lift of 10# box after instruction. Will monitor and attempt to progress as tolerated.   This is a 52 y.o. male referred to outpatient physical therapy and presents with a medical diagnosis of   Encounter Diagnoses   Name Primary?    Neck pain Yes    Chronic bilateral low back pain without sciatica    and demonstrates limitations as described in the problem list.   Pt will continue to benefit from skilled outpatient physical therapy to address the deficits listed in the problem list, provide pt/family education and to maximize pt's level of independence in the home and community environment.     GOALS: Short  Term Goals:    - Pt will have pain-free cervical extension in order to show improved mobility.  - Pt will be able to demonstrate proper lifting from floor to waist of a 10-15# object in order to prevent injury with occupational demands.  - Pt will be consistent and independent with HEP in order to promote carryover between therapy sessions.  - Pt will be able to demonstrate proper sitting posture without therapist cuing in order to decrease pain and improve positional tolerance.     Long Term Goals:   - Pt will be able to stand >5 hours with back and neck pain at less than a 2/10 in order to promote improved tolerance to work demands.  - Pt will increase periscapular strength by 1 ms grade in order to improve performance of functional activities to increase tolerance for ADL and work activities.  - Pt will increase gluteus gaby and medius strength by 1 ms grade in order to improve performance of functional activities to increase tolerance for ADL and work activities.  -  Pt to be Independent with updated HEP to maintain therapy gains following DC.  -  Pt will report improved management of chronic pain with at least a 25% reduction in back and neck pain since start of therapy.        Plan     Continue with established Plan of Care towards PT goals.    Therapist: Julien Grove, PTA  5/2/2018

## 2018-05-04 ENCOUNTER — CLINICAL SUPPORT (OUTPATIENT)
Dept: REHABILITATION | Facility: HOSPITAL | Age: 53
End: 2018-05-04
Attending: INTERNAL MEDICINE
Payer: COMMERCIAL

## 2018-05-04 DIAGNOSIS — M54.2 NECK PAIN: ICD-10-CM

## 2018-05-04 DIAGNOSIS — M54.40 BILATERAL LOW BACK PAIN WITH SCIATICA, SCIATICA LATERALITY UNSPECIFIED, UNSPECIFIED CHRONICITY: ICD-10-CM

## 2018-05-04 PROCEDURE — 97110 THERAPEUTIC EXERCISES: CPT | Mod: PO

## 2018-05-04 PROCEDURE — 97012 MECHANICAL TRACTION THERAPY: CPT | Mod: PO

## 2018-05-04 NOTE — PROGRESS NOTES
Physical Therapy Daily Note     Name: Gee Montez  Clinic Number: [id number]  Diagnosis:   Encounter Diagnoses   Name Primary?    Neck pain     Bilateral low back pain with sciatica, sciatica laterality unspecified, unspecified chronicity      Physician: Omar Arredondo MD  Precautions: standard   Evaluation Date: 4/25/18  Visit # authorized: 2/20  Authorization period: 12/31/18  Plan of care Expiration: 6/20/18  Time In: 1038  Time Out: 1130  Treatment time: 52 minutes    Subjective     Pt reports: he was sore a little after last tx session. 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  Pain Scale: Gee rates pain on a scale of 0-10 to be 3 currently.    Objective     Gee received individual therapeutic exercises to develop cervical/postural/lumbarstrength, endurance, ROM, flexibility, posture and core stabilization for 42 minutes including:    Standing ex's  · Corner Pec stretch 3 x 20 sec  · Rows with GTB 2 x 10  · Shoulder ext with GTB 2 x 10  · Brueggers with YTB 2 x 10   · Lower trap lift off against wall x 10  · Middle trap lift off against wall x 10.  · Patient educated in and performs floor<->waist lift of 10# box with good technique.  · Serratus wall slide x 10    Supine ex's  · Chin tucks 20 x 5 sec with towel under head  · Cervical rotation B 10 x 5s ea side  · Gentle cervical isometrics flex and extension 5 x 5s  · Gentle cervical rotation isometrics 5 x 5s  · Piriformis stretch 3 x 20s  · Pelvic tilt x 10 with 5 sec hold   · Pelvic tilt with hip abd/ER with GTB  · Pelvic tilt with Hip add isometric  · Bridging 1 x 10    Gee received the following manual therapy techniques: Soft tissue Mobilization were applied to the: cervical paraspinals for 5 minutes including: Not performed today  STM cervical paraspinals/UT    Patient receives moist heat applied to cervical area x 5 minutes for muscle relaxation prior to  traction  Patient received mechanical cervical traction  for pain control with a  static   pull @ 15 lbs, with a 15 degree angle of pull for10 minutes.  Patient reporting good relief initially but did experience (R) UT spasm within 3 minutes after completion of traction that was relieved with STM.     Patient education:  Patient educated to continue with previously issued HEP to tolerance.  Gee verbalized good understanding of education provided.   No spiritual or educational barriers to learning provided    Assessment     Patient had overall good tolerance to therex today. Some slight R shoulder discomfort with postural strengthening activities. No increase in neck pain or symptoms with addition of cervical strengthening therex as listed above. Continued with mechanical tx treatment today without pain or discomfort following. Will monitor and attempt to progress as tolerated.   This is a 52 y.o. male referred to outpatient physical therapy and presents with a medical diagnosis of   Encounter Diagnoses   Name Primary?    Neck pain Yes    Chronic bilateral low back pain without sciatica    and demonstrates limitations as described in the problem list.   Pt will continue to benefit from skilled outpatient physical therapy to address the deficits listed in the problem list, provide pt/family education and to maximize pt's level of independence in the home and community environment.     GOALS: Short Term Goals:    - Pt will have pain-free cervical extension in order to show improved mobility.  - Pt will be able to demonstrate proper lifting from floor to waist of a 10-15# object in order to prevent injury with occupational demands.  - Pt will be consistent and independent with HEP in order to promote carryover between therapy sessions.  - Pt will be able to demonstrate proper sitting posture without therapist cuing in order to decrease pain and improve positional tolerance.     Long Term Goals:   - Pt will be able  to stand >5 hours with back and neck pain at less than a 2/10 in order to promote improved tolerance to work demands.  - Pt will increase periscapular strength by 1 ms grade in order to improve performance of functional activities to increase tolerance for ADL and work activities.  - Pt will increase gluteus gaby and medius strength by 1 ms grade in order to improve performance of functional activities to increase tolerance for ADL and work activities.  -  Pt to be Independent with updated HEP to maintain therapy gains following DC.  -  Pt will report improved management of chronic pain with at least a 25% reduction in back and neck pain since start of therapy.        Plan     Continue with established Plan of Care towards PT goals.    Therapist: Ashley Holstein, PT  5/4/2018

## 2018-05-07 ENCOUNTER — CLINICAL SUPPORT (OUTPATIENT)
Dept: REHABILITATION | Facility: HOSPITAL | Age: 53
End: 2018-05-07
Attending: INTERNAL MEDICINE
Payer: COMMERCIAL

## 2018-05-07 DIAGNOSIS — M54.40 BILATERAL LOW BACK PAIN WITH SCIATICA, SCIATICA LATERALITY UNSPECIFIED, UNSPECIFIED CHRONICITY: ICD-10-CM

## 2018-05-07 DIAGNOSIS — M54.2 NECK PAIN: ICD-10-CM

## 2018-05-07 PROCEDURE — 97110 THERAPEUTIC EXERCISES: CPT | Mod: PO

## 2018-05-07 PROCEDURE — 97012 MECHANICAL TRACTION THERAPY: CPT | Mod: PO

## 2018-05-07 NOTE — PROGRESS NOTES
Physical Therapy Daily Note     Name: Gee Montez  Clinic Number: [id number]  Diagnosis:   Encounter Diagnoses   Name Primary?   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 He had to take two tramadol within an hour of being at work this morning. He tries to take tramadol once per day (2 pills at a time).  Pain Scale: Gee rates pain on a scale of 0-10 to be 3 currently.    Objective     Gee received individual therapeutic exercises to develop cervical/postural/lumbarstrength, endurance, ROM, flexibility, posture and core stabilization for 40 minutes including:    Session initiated with UBE 3'/3'    Standing ex's  · Corner Pec stretch 3 x 20 sec  · Rows with GTB 2 x 10  · Shoulder ext with GTB 2 x 10  · Brueggers with YTB 2 x 10   · Lower trap lift off against wall x 10  · Middle trap lift off against wall x 10.  · Patient educated in and performs floor<->waist lift of 10# box with good technique.  · Serratus wall slide x 10    Next: lat steps, possible sled, cable cross push/pull    Supine ex's  · Chin tucks 20 x 5 sec with towel under head + alt arm flex  · Cervical rotation B 10 x 5s ea side  · Gentle cervical isometrics flex and extension 5 x 5s  · Gentle cervical rotation isometrics 5 x 5s  · Piriformis stretch 3 x 20s  · Pelvic tilt with hip abd/ER with GTB 20x  · Pelvic tilt with Hip add isometric 20x  · Bridging 1 x 15      Gee received the following manual therapy techniques: Soft tissue Mobilization were applied  to the: cervical paraspinals for 5 minutes including:   STM cervical paraspinals/UT    Patient receives moist heat applied to cervical area x 5 minutes for muscle relaxation prior to traction  Patient received mechanical cervical traction  for pain control with a  static   pull @ 15 lbs, with a 15 degree angle of pull for10 minutes.      Patient education:  Patient educated to continue with previously issued HEP to tolerance.  Gee verbalized good understanding of education provided.   No spiritual or educational barriers to learning provided    Assessment     Some R shoulder discomfort present with postural strengthening activities today. Pt continues to require cueing for proper scapular mechanics and deep neck flexor activation with therex. Will continue to promote core and neck strengthening along with edicating patient on proper lifting and bending techniques in order to decrease pain with occupational tasks.  This is a 52 y.o. male referred to outpatient physical therapy and presents with a medical diagnosis of   Encounter Diagnoses   Name Primary?    Neck pain Yes    Chronic bilateral low back pain without sciatica    and demonstrates limitations as described in the problem list.   Pt will continue to benefit from skilled outpatient physical therapy to address the deficits listed in the problem list, provide pt/family education and to maximize pt's level of independence in the home and community environment.     GOALS: Short Term Goals:    - Pt will have pain-free cervical extension in order to show improved mobility.  - Pt will be able to demonstrate proper lifting from floor to waist of a 10-15# object in order to prevent injury with occupational demands.  - Pt will be consistent and independent with HEP in order to promote carryover between therapy sessions.  - Pt will be able to demonstrate proper sitting posture without therapist cuing in order to decrease pain and improve positional  tolerance.     Long Term Goals:   - Pt will be able to stand >5 hours with back and neck pain at less than a 2/10 in order to promote improved tolerance to work demands.  - Pt will increase periscapular strength by 1 ms grade in order to improve performance of functional activities to increase tolerance for ADL and work activities.  - Pt will increase gluteus gaby and medius strength by 1 ms grade in order to improve performance of functional activities to increase tolerance for ADL and work activities.  -  Pt to be Independent with updated HEP to maintain therapy gains following DC.  -  Pt will report improved management of chronic pain with at least a 25% reduction in back and neck pain since start of therapy.        Plan     Continue with established Plan of Care towards PT goals.    Therapist: Ashley Holstein, PT  5/7/2018

## 2018-05-07 NOTE — PROGRESS NOTES
Physical Therapy Daily Note     Name: Gee Montez  [facility] Number: [id number]  Diagnosis:   Encounter Diagnoses   Name Primary?    Neck pain     Bilateral low back pain with sciatica, sciatica laterality unspecified, unspecified chronicity      Physician: Omar Arredondo MD  Precautions: standard   Evaluation Date: 4/25/18  Visit # authorized: 4/20  Authorization period: 12/31/18  Plan of care Expiration: 6/20/18  Time In: 3:00  Time Out: 4:00  Treatment time: 52 minutes    Subjective     Pt reports: he was sore a little after last tx session. 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  Pain Scale: Gee rates pain on a scale of 0-10 to be 3 currently.    Objective     Gee received individual therapeutic exercises to develop cervical/postural/lumbarstrength, endurance, ROM, flexibility, posture and core stabilization for 42 minutes including:    Seated UBE x 4 minutes 2 minutes forward/backward     Standing ex's  · Corner Pec stretch 3 x 20 sec  · Rows with GTB 2 x 10  · Shoulder ext with GTB 2 x 10  · Brueggers with YTB 2 x 10   · Lower trap lift off against wall x 10  · Middle trap lift off against wall x 10.  · Serratus wall slide x 10    Supine ex's  · Chin tucks 20 x 5 sec with towel under head  · Cervical rotation B 10 x 5s ea side  · Gentle cervical isometrics flex and extension 5 x 5s  · Gentle cervical rotation isometrics 5 x 5s  · Piriformis stretch 3 x 20s  · Pelvic tilt x 10 with 5 sec hold   · Pelvic tilt with hip abd/ER with GTB  · Pelvic tilt with Hip add isometric  · Bridging 1 x 10    Gee received the following manual therapy techniques: Soft tissue Mobilization were applied to the: cervical paraspinals for 5 minutes including: Not performed today  STM cervical paraspinals/UT    Patient receives moist heat applied to cervical area x 5 minutes for muscle relaxation prior to traction  Patient received mechanical  cervical traction  for pain control with a  static   pull @ 15 lbs, with a 15 degree angle of pull for10 minutes.  Patient reporting good relief initially but did experience (R) UT spasm within 3 minutes after completion of traction that was relieved with STM.     Patient education:  Patient educated to continue with previously issued HEP to tolerance.  Gee verbalized good understanding of education provided.   No spiritual or educational barriers to learning provided    Assessment     Patient had overall good tolerance to therex today. Some slight R shoulder discomfort with postural strengthening activities. No increase in neck pain or symptoms with addition of cervical strengthening therex as listed above. Continued with mechanical tx treatment today without pain or discomfort following. Will monitor and attempt to progress as tolerated.   This is a 52 y.o. male referred to outpatient physical therapy and presents with a medical diagnosis of   Encounter Diagnoses   Name Primary?    Neck pain Yes    Chronic bilateral low back pain without sciatica    and demonstrates limitations as described in the problem list.   Pt will continue to benefit from skilled outpatient physical therapy to address the deficits listed in the problem list, provide pt/family education and to maximize pt's level of independence in the home and community environment.     GOALS: Short Term Goals:    - Pt will have pain-free cervical extension in order to show improved mobility.  - Pt will be able to demonstrate proper lifting from floor to waist of a 10-15# object in order to prevent injury with occupational demands.  - Pt will be consistent and independent with HEP in order to promote carryover between therapy sessions.  - Pt will be able to demonstrate proper sitting posture without therapist cuing in order to decrease pain and improve positional tolerance.     Long Term Goals:   - Pt will be able to stand >5 hours with back and neck  pain at less than a 2/10 in order to promote improved tolerance to work demands.  - Pt will increase periscapular strength by 1 ms grade in order to improve performance of functional activities to increase tolerance for ADL and work activities.  - Pt will increase gluteus gaby and medius strength by 1 ms grade in order to improve performance of functional activities to increase tolerance for ADL and work activities.  -  Pt to be Independent with updated HEP to maintain therapy gains following DC.  -  Pt will report improved management of chronic pain with at least a 25% reduction in back and neck pain since start of therapy.        Plan     Continue with established Plan of Care towards PT goals.    Therapist: Julien Grove, PTA  5/7/2018

## 2018-05-15 ENCOUNTER — CLINICAL SUPPORT (OUTPATIENT)
Dept: REHABILITATION | Facility: HOSPITAL | Age: 53
End: 2018-05-15
Attending: INTERNAL MEDICINE
Payer: COMMERCIAL

## 2018-05-15 DIAGNOSIS — M54.2 NECK PAIN: Primary | ICD-10-CM

## 2018-05-15 DIAGNOSIS — M54.40 BILATERAL LOW BACK PAIN WITH SCIATICA, SCIATICA LATERALITY UNSPECIFIED, UNSPECIFIED CHRONICITY: ICD-10-CM

## 2018-05-15 PROCEDURE — 97012 MECHANICAL TRACTION THERAPY: CPT | Mod: PO

## 2018-05-15 PROCEDURE — 97110 THERAPEUTIC EXERCISES: CPT | Mod: PO

## 2018-05-15 NOTE — PROGRESS NOTES
Physical Therapy Daily Note     Name: Gee Montez  Clinic Number: [id number]  Diagnosis:   Encounter Diagnoses   Name Primary?    Neck pain Yes    Bilateral low back pain with sciatica, sciatica laterality unspecified, unspecified chronicity      Physician: Omar Arredondo MD  Precautions: standard   Evaluation Date: 4/25/18  Visit # authorized: 4/20  Authorization period: 12/31/18  Plan of care Expiration: 6/20/18  Time In: 3;10 (patient with late arrival)  Time Out: 0400  Treatment time: 45 minutes    Subjective     Pt reports: that he strained hie (R) thoracolumbar area  area while out of town. Also reports that his neck is not feeling too bad now because he has not worked in a week. States that he took some Toradol after initial injury but has since stopped.    Pain Scale: Gee rates pain on a scale of 0-10 to be 3 currently to (R) side thorax, neck and lower back..    Objective     Gee received individual therapeutic exercises to develop cervical/postural/lumbarstrength, endurance, ROM, flexibility, posture and core stabilization for 35 minutes including:    Session initiated with UBE  X 5 minutes total ( 2.5 minutes forward/backward) level 1    Standing ex's  · Corner Pec stretch 3 x 20 sec  · Deep neck flexion with ball on the wall x 10  · Rows with GTB 2 x 10  · Shoulder ext with GTB 2 x 10  · Brueggers with YTB 2 x 10   · Lower trap lift off against wall x 10  · Middle trap lift off against wall x 10.  · Patient educated in and performs floor<->waist lift of 10# box with good technique--NP.  · Serratus wall slide + red Zokea band  2 x 10  · Cable cross push/pull with 7# x 10 each     Supine ex's  · Chin tucks 10 x 5 sec with towel under head + alt arm flex  · Cervical rotation B 10 x 5s ea side --NP  · Gentle cervical isometrics flex and extension 5 x 5s--NP  · Gentle cervical rotation isometrics 5 x 5s--NP  · Piriformis stretch 3 x  20s--NP  · Pelvic tilt with hip abd/ER with  BTB 20x  · Pelvic tilt with Hip add isometric 20x--NP  · Bridging + iso hip abd BTB 1 x 15      Gee received the following manual therapy techniques: Soft tissue Mobilization were applied to the: cervical paraspinals for 5 minutes including: ( NP)  STM cervical paraspinals/UT    Patient receives moist heat applied to cervical area x 5 minutes for muscle relaxation prior to traction (NP)  Patient received mechanical cervical traction  for pain control with a  static   pull @ 15 lbs, with a 15 degree angle of pull for10 minutes.      Patient education:  Patient educated to continue with previously issued HEP to tolerance.  Gee verbalized good understanding of education provided.   No spiritual or educational barriers to learning provided    Assessment     Patient talib TX fair. He was somewhat guarded initially due to new (R) side thoracolumbar strain.  He was able to perform the majority of ex's/activities without increased pain symptoms including new activity of cable cross push/pull working on postural strength/stability. He did report  some (R) shoulder discomfort with serratus wall slides that was tolerable along with min lower back discomfort with bridging ex. .Some limitations with full ex session due to time constraints with late arrival today. Discomfort level remained in the tolerable range post Tx. Notes good relief with mechanical cervical traction. Will monitor and attempt to progress as tolerated.  This is a 52 y.o. male referred to outpatient physical therapy and presents with a medical diagnosis of   Encounter Diagnoses   Name Primary?    Neck pain Yes    Chronic bilateral low back pain without sciatica    and demonstrates limitations as described in the problem list.   Pt will continue to benefit from skilled outpatient physical therapy to address the deficits listed in the problem list, provide pt/family education and to maximize pt's level of  independence in the home and community environment.     GOALS: Short Term Goals:    - Pt will have pain-free cervical extension in order to show improved mobility.  - Pt will be able to demonstrate proper lifting from floor to waist of a 10-15# object in order to prevent injury with occupational demands.  - Pt will be consistent and independent with HEP in order to promote carryover between therapy sessions.  - Pt will be able to demonstrate proper sitting posture without therapist cuing in order to decrease pain and improve positional tolerance.     Long Term Goals:   - Pt will be able to stand >5 hours with back and neck pain at less than a 2/10 in order to promote improved tolerance to work demands.  - Pt will increase periscapular strength by 1 ms grade in order to improve performance of functional activities to increase tolerance for ADL and work activities.  - Pt will increase gluteus gaby and medius strength by 1 ms grade in order to improve performance of functional activities to increase tolerance for ADL and work activities.  -  Pt to be Independent with updated HEP to maintain therapy gains following DC.  -  Pt will report improved management of chronic pain with at least a 25% reduction in back and neck pain since start of therapy.        Plan     Continue with established Plan of Care towards PT goals.    Therapist: Julien Grove, PTA  5/15/2018

## 2018-05-17 ENCOUNTER — CLINICAL SUPPORT (OUTPATIENT)
Dept: REHABILITATION | Facility: HOSPITAL | Age: 53
End: 2018-05-17
Attending: INTERNAL MEDICINE
Payer: COMMERCIAL

## 2018-05-17 DIAGNOSIS — M54.2 NECK PAIN: ICD-10-CM

## 2018-05-17 DIAGNOSIS — M54.40 BILATERAL LOW BACK PAIN WITH SCIATICA, SCIATICA LATERALITY UNSPECIFIED, UNSPECIFIED CHRONICITY: Primary | ICD-10-CM

## 2018-05-17 PROCEDURE — 97012 MECHANICAL TRACTION THERAPY: CPT | Mod: PO

## 2018-05-17 PROCEDURE — 97110 THERAPEUTIC EXERCISES: CPT | Mod: PO

## 2018-05-17 NOTE — PROGRESS NOTES
"                                                    Physical Therapy Daily Note     Name: Gee Montez  Clinic Number: [id number]  Diagnosis:   Encounter Diagnoses   Name Primary?    Neck pain     Bilateral low back pain with sciatica, sciatica laterality unspecified, unspecified chronicity Yes     Physician: Omar Arredondo MD  Precautions: standard   Evaluation Date: 4/25/18  Visit # authorized: 7/20  Authorization period: 12/31/18  Plan of care Expiration: 6/20/18  Time In: 4:00  Time Out:5:00  Treatment time: 60 minutes    Subjective     Pt reports:usual aches and pains. 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    Pain Scale: Gee rates pain on a scale of 0-10 to be 3 currently to (R) side thorax, neck and lower back..    Objective     Gee received individual therapeutic exercises to develop cervical/postural/lumbarstrength, endurance, ROM, flexibility, posture and core stabilization for 50 minutes including:    Session initiated with UBE  x 6 minutes total ( 3 minutes forward/backward) level 1    Standing ex's  · Corner Pec stretch 3 x 20 sec  · Deep neck flexion with ball on the wall 2 x 10  · RowsOn cable cross with 10# 2 x 10  · Shoulder ext with GTB 2 x 10  · Brueggers with YTB 2 x 10   · Lower trap lift off against wall x 12  · Middle trap lift off against wall x 12.  · Patient educated in and performs floor<->waist lift of 10# box with good technique--NP.  · Serratus wall slide + red Zokea band  2 x 10  · Cable cross push/pull with 7# 2 x 10 each     Supine ex's  · Chin tucks 10 x 5 sec with towel under head + alt arm flex with 1#   · Cervical rotation B 10 x 5s ea side --NP  · Gentle cervical isometrics flex and extension 5 x 5s--NP  · Gentle cervical rotation isometrics 5 x 5s--NP  · Piriformis stretch 3 x 20s--NP  · Pelvic tilt with hip abd/ER with  BTB 20x--NP  · Pelvic tilt with Hip add isometric " 20x--NP  · Bridging + iso hip abd BTB 1 x 20  · Pelvic tilt with Tband pull down (GTB) 2 x 10    Sidelying ex's:   · Clams 2 x 10      Gee received the following manual therapy techniques: Soft tissue Mobilization were applied to the: cervical paraspinals for 5 minutes including: ( NP)  STM cervical paraspinals/UT    Patient receives moist heat applied to cervical area x 5 minutes for muscle relaxation prior to traction (NP)    Patient received mechanical cervical traction  for pain control with a  static   pull @ 15 lbs, with a 15 degree angle of pull for10 minutes.      Patient education:  Patient educated to continue with previously issued HEP to tolerance.  Gee verbalized good understanding of education provided.   No spiritual or educational barriers to learning provided    Assessment     Patient talib TX fairly well. Improved tolerance for ex's from previous session.  He was able to perform and progress slightly within tolerable level of muscular fatigue and discomfort.   Min (L) side thoracic discomfort with alt shoulder flex with 1# weight in supine however, this was resolved with cuing to decrease his ROM for this. Notes good relief with cervical mechanical traction.  Discomfort level remained in the tolerable range post Tx. Notes good relief with mechanical cervical traction. Will monitor and attempt to progress as tolerated.  This is a 52 y.o. male referred to outpatient physical therapy and presents with a medical diagnosis of   Encounter Diagnoses   Name Primary?    Neck pain Yes    Chronic bilateral low back pain without sciatica    and demonstrates limitations as described in the problem list.   Pt will continue to benefit from skilled outpatient physical therapy to address the deficits listed in the problem list, provide pt/family education and to maximize pt's level of independence in the home and community environment.     GOALS: Short Term Goals:    - Pt will have pain-free cervical  extension in order to show improved mobility.  - Pt will be able to demonstrate proper lifting from floor to waist of a 10-15# object in order to prevent injury with occupational demands.  - Pt will be consistent and independent with HEP in order to promote carryover between therapy sessions.  - Pt will be able to demonstrate proper sitting posture without therapist cuing in order to decrease pain and improve positional tolerance.     Long Term Goals:   - Pt will be able to stand >5 hours with back and neck pain at less than a 2/10 in order to promote improved tolerance to work demands.  - Pt will increase periscapular strength by 1 ms grade in order to improve performance of functional activities to increase tolerance for ADL and work activities.  - Pt will increase gluteus gaby and medius strength by 1 ms grade in order to improve performance of functional activities to increase tolerance for ADL and work activities.  -  Pt to be Independent with updated HEP to maintain therapy gains following DC.  -  Pt will report improved management of chronic pain with at least a 25% reduction in back and neck pain since start of therapy.        Plan     Continue with established Plan of Care towards PT goals.    Therapist: Julien Grove, PTA  5/17/2018

## 2018-05-23 ENCOUNTER — CLINICAL SUPPORT (OUTPATIENT)
Dept: REHABILITATION | Facility: HOSPITAL | Age: 53
End: 2018-05-23
Attending: INTERNAL MEDICINE
Payer: COMMERCIAL

## 2018-05-23 DIAGNOSIS — G89.29 CHRONIC BILATERAL LOW BACK PAIN, WITH SCIATICA PRESENCE UNSPECIFIED: ICD-10-CM

## 2018-05-23 DIAGNOSIS — M54.5 CHRONIC BILATERAL LOW BACK PAIN, WITH SCIATICA PRESENCE UNSPECIFIED: ICD-10-CM

## 2018-05-23 DIAGNOSIS — M54.2 NECK PAIN: ICD-10-CM

## 2018-05-23 PROCEDURE — 97012 MECHANICAL TRACTION THERAPY: CPT | Mod: PO

## 2018-05-23 PROCEDURE — 97110 THERAPEUTIC EXERCISES: CPT | Mod: PO

## 2018-05-23 NOTE — PROGRESS NOTES
"                                                    Physical Therapy Daily Note     Name: Gee Montez  Clinic Number: [id number]  Diagnosis:   Encounter Diagnoses   Name Primary?    Neck pain     Chronic bilateral low back pain, with sciatica presence unspecified      Physician: Omar Arredondo MD  Precautions: standard   Evaluation Date: 4/25/18  Visit # authorized: 8/20  Authorization period: 12/31/18  Plan of care Expiration: 6/20/18  Time In: 3:05  Time Out:4:00  Treatment time:50 minutes    Subjective     Pt reports onset of some increased neck soreness after having to perform " double" work yesterday when someone called in sick. Also , states that he did not take his normal tramadol meds.   He reports some temporary relief of neck symptoms with traction.  Pain Scale: Gee rates pain on a scale of 0-10 to be 5-6 currently to (R) side thorax, neck and lower back..    Objective     Gee received individual therapeutic exercises to develop cervical/postural/lumbarstrength, endurance, ROM, flexibility, posture and core stabilization for 35 minutes including:    Session initiated with UBE  x 5 minutes total ( 2.5 minutes forward/backward) level 1    Standing ex's  · Corner Pec stretch 2 x 30  sec  · Deep neck flexion with ball on the wall 2 x 10  · Rows On cable cross with 10# 2 x 10  · Shoulder ext with GTB 2 x 10--NP  · Brueggers with YTB 2 x 10   · Lower trap lift off against wall x 12--NP  · Middle trap lift off against wall x 12.--NP  · Patient educated in and performs floor<->waist lift of 10# box with good technique--NP.  · Serratus wall slide + red Zokea band  1 x 10, 1 x 5 ( stopped due to discomfort)   · Cable cross push/pull with 7# 2 x 10 each     Supine ex's  · Chin tucks 10 x 5 sec with towel under head + alt arm flex with 1# --NP  · Cervical rotation B 10 x 5s ea side --NP  · Gentle cervical isometrics flex and extension 5 x 5s--NP  · Gentle cervical rotation isometrics 5 x " 5s--NP  · Piriformis stretch 3 x 20s--NP  · Pelvic tilt with hip abd/ER with  BTB 20x--NP  · Pelvic tilt with Hip add isometric 20x--NP  · Bridging + iso hip abd BTB 1 x 20--NP  · Pelvic tilt with Tband pull down (GTB) 2 x 10--NP    Sidelying ex's:   · Clams 2 x 10--NP      Gee received the following manual therapy techniques: Soft tissue Mobilization were applied to the: cervical paraspinals for 5 minutes including:    STM cervical paraspinals/UT    Patient receives moist heat applied to cervical area x 5 minutes for muscle relaxation prior to traction (NP)    Patient received mechanical cervical traction  for pain control with a  static   pull @ 15 lbs, with a 15 degree angle of pull for10 minutes.      Patient education:  Patient educated to continue with previously issued HEP to tolerance.  Gee verbalized good understanding of education provided.   No spiritual or educational barriers to learning provided    Assessment     Patient talib TX fair. Patient presenting with some increased neck/upper back soreness from a hectic work day yesterday and also wanted to trial Tx without his usual Tramadol pain meds.   Decreased overall ex tolerance as a result. He reported most difficulty with serratus wall slides with T-band and was only able to complete 1 and 1/2 sets for this due to increasing discomfort.  Notes good relief with cervical mechanical traction.  . Notes good relief with mechanical cervical traction unfortunately, relief has only been temporary. He reports increased stress with his occupational demands.  Pain level = 3/10 post Tx.. Will monitor and attempt to progress as tolerated.  This is a 52 y.o. male referred to outpatient physical therapy and presents with a medical diagnosis of   Encounter Diagnoses   Name Primary?    Neck pain Yes    Chronic bilateral low back pain without sciatica    and demonstrates limitations as described in the problem list.   Pt will continue to benefit from skilled  outpatient physical therapy to address the deficits listed in the problem list, provide pt/family education and to maximize pt's level of independence in the home and community environment.     GOALS: Short Term Goals:    - Pt will have pain-free cervical extension in order to show improved mobility.  - Pt will be able to demonstrate proper lifting from floor to waist of a 10-15# object in order to prevent injury with occupational demands.  - Pt will be consistent and independent with HEP in order to promote carryover between therapy sessions.  - Pt will be able to demonstrate proper sitting posture without therapist cuing in order to decrease pain and improve positional tolerance.     Long Term Goals:   - Pt will be able to stand >5 hours with back and neck pain at less than a 2/10 in order to promote improved tolerance to work demands.  - Pt will increase periscapular strength by 1 ms grade in order to improve performance of functional activities to increase tolerance for ADL and work activities.  - Pt will increase gluteus gaby and medius strength by 1 ms grade in order to improve performance of functional activities to increase tolerance for ADL and work activities.  -  Pt to be Independent with updated HEP to maintain therapy gains following DC.  -  Pt will report improved management of chronic pain with at least a 25% reduction in back and neck pain since start of therapy.        Plan     Continue with established Plan of Care towards PT goals.    Therapist: Julien Grove, PTA  5/23/2018

## 2018-05-25 ENCOUNTER — CLINICAL SUPPORT (OUTPATIENT)
Dept: REHABILITATION | Facility: HOSPITAL | Age: 53
End: 2018-05-25
Attending: INTERNAL MEDICINE
Payer: COMMERCIAL

## 2018-05-25 DIAGNOSIS — M54.5 CHRONIC BILATERAL LOW BACK PAIN, WITH SCIATICA PRESENCE UNSPECIFIED: ICD-10-CM

## 2018-05-25 DIAGNOSIS — G89.29 CHRONIC BILATERAL LOW BACK PAIN, WITH SCIATICA PRESENCE UNSPECIFIED: ICD-10-CM

## 2018-05-25 DIAGNOSIS — M54.2 NECK PAIN: ICD-10-CM

## 2018-05-25 PROCEDURE — 97012 MECHANICAL TRACTION THERAPY: CPT | Mod: PO

## 2018-05-25 PROCEDURE — 97110 THERAPEUTIC EXERCISES: CPT | Mod: PO

## 2018-05-25 NOTE — PROGRESS NOTES
Physical Therapy Daily Note     Name: Gee Montez  Clinic Number: [id number]  Diagnosis:   Encounter Diagnoses   Name Primary?    Neck pain     Chronic bilateral low back pain, with sciatica presence unspecified      Physician: Omar Arredondo MD  Precautions: standard   Evaluation Date: 4/25/18  Visit # authorized: 9/20  Authorization period: 12/31/18  Plan of care Expiration: 6/20/18  Time In: 2:00  Time Out:3:00  Treatment time:55 minutes    Subjective     Pt reports symptoms are diminished today from previous Tx. States that he did take his Tramadol meds today. Continues to c/o work related stress.   Pain Scale: Gee rates pain on a scale of 0-10 to be 3 currently to (R) side thorax, neck and lower back..    Objective     Gee received individual therapeutic exercises to develop cervical/postural/lumbarstrength, endurance, ROM, flexibility, posture and core stabilization for 45 minutes including:    Session initiated with UBE  x 5 minutes total ( 2.5 minutes forward/backward) level 1    Standing ex's  · Corner Pec stretch 2 x 30  sec  · Deep neck flexion with ball on the wall 2 x 10  · Rows On cable cross with 10# 2 x 10  · Shoulder ext on cable cross with 7#  2 x 10-   · Brueggers with OTB 2 x 10   · Lower trap lift off against wall x 10   · Middle trap lift off against wall x 10-   · Patient educated in and performs floor<->waist lift of 10# box with good technique--NP.  · Serratus wall slide + red Zokea band  1 x 10 , 1 x 6 stopped due to pain.  · Cable cross push/pull with 7# 2 x 10 each     Supine ex's  · Chin tucks 10 x 5 sec with towel under head + alt arm flex with 1#  --NP  · Cervical rotation B 10 x 5s ea side   · Gentle cervical isometrics flex and extension 5 x 5s--NP  · Gentle cervical rotation isometrics 5 x 5s--NP  · Piriformis stretch 3 x 20s--NP  · Pelvic tilt with hip abd/ER with  BTB 20x   · Pelvic tilt with Hip add isometric 20x    · Bridging + iso hip abd BTB 1 x 20-   · Pelvic tilt with Tband pull down (GTB) 2 x 10     Sidelying ex's:   · Clams 2 x 10--NP      Gee received the following manual therapy techniques: Soft tissue Mobilization were applied to the: cervical paraspinals for 5 minutes including:  (NP)  STM cervical paraspinals/UT    Patient receives moist heat applied to cervical area x 5 minutes for muscle relaxation prior to traction (NP)    Patient received mechanical cervical traction  for pain control with a  static   pull @ 15 lbs, with a 15 degree angle of pull for10 minutes.      Patient education:  Patient educated to continue with previously issued HEP to tolerance.  Gee verbalized good understanding of education provided.   No spiritual or educational barriers to learning provided    Assessment     Patient talib TX fair. Serratus wall slide remains challenging .. Resumed performance of some lower back ex's with muscular fatigue only and without increased pain. .  Notes good relief with cervical mechanical traction.  . Notes good relief with mechanical cervical traction unfortunately, relief has only been temporary. He reports increased stress with his occupational demands whioch contributes to chronic pain. .  Pain level = 3/10 post Tx.. Will monitor and attempt to progress as tolerated.  This is a 52 y.o. male referred to outpatient physical therapy and presents with a medical diagnosis of   Encounter Diagnoses   Name Primary?    Neck pain Yes    Chronic bilateral low back pain without sciatica    and demonstrates limitations as described in the problem list.   Pt will continue to benefit from skilled outpatient physical therapy to address the deficits listed in the problem list, provide pt/family education and to maximize pt's level of independence in the home and community environment.     GOALS: Short Term Goals:    - Pt will have pain-free cervical extension in order to show improved mobility.  - Pt will be  able to demonstrate proper lifting from floor to waist of a 10-15# object in order to prevent injury with occupational demands.  - Pt will be consistent and independent with HEP in order to promote carryover between therapy sessions.  - Pt will be able to demonstrate proper sitting posture without therapist cuing in order to decrease pain and improve positional tolerance.     Long Term Goals:   - Pt will be able to stand >5 hours with back and neck pain at less than a 2/10 in order to promote improved tolerance to work demands.  - Pt will increase periscapular strength by 1 ms grade in order to improve performance of functional activities to increase tolerance for ADL and work activities.  - Pt will increase gluteus gaby and medius strength by 1 ms grade in order to improve performance of functional activities to increase tolerance for ADL and work activities.  -  Pt to be Independent with updated HEP to maintain therapy gains following DC.  -  Pt will report improved management of chronic pain with at least a 25% reduction in back and neck pain since start of therapy.        Plan     Continue with established Plan of Care towards PT goals.    Therapist: Julien Grove, PTA  5/25/2018

## 2018-05-30 ENCOUNTER — CLINICAL SUPPORT (OUTPATIENT)
Dept: REHABILITATION | Facility: HOSPITAL | Age: 53
End: 2018-05-30
Attending: INTERNAL MEDICINE
Payer: COMMERCIAL

## 2018-05-30 DIAGNOSIS — M54.5 CHRONIC BILATERAL LOW BACK PAIN, WITH SCIATICA PRESENCE UNSPECIFIED: ICD-10-CM

## 2018-05-30 DIAGNOSIS — M54.2 NECK PAIN: ICD-10-CM

## 2018-05-30 DIAGNOSIS — G89.29 CHRONIC BILATERAL LOW BACK PAIN, WITH SCIATICA PRESENCE UNSPECIFIED: ICD-10-CM

## 2018-05-30 PROCEDURE — 97110 THERAPEUTIC EXERCISES: CPT | Mod: PO

## 2018-05-30 PROCEDURE — 97012 MECHANICAL TRACTION THERAPY: CPT | Mod: PO

## 2018-05-30 NOTE — PROGRESS NOTES
Physical Therapy Daily Note     Name: Gee Montez  Clinic Number: [id number]  Diagnosis:   Encounter Diagnoses   Name Primary?    Neck pain     Chronic bilateral low back pain, with sciatica presence unspecified      Physician: Omar Arredondo MD  Precautions: standard   Evaluation Date: 4/25/18  Visit # authorized: 10/20  Authorization period: 12/31/18  Plan of care Expiration: 6/20/18  Time In: 3:00  Time Out:4:00  Treatment time:55 minutes    Subjective     Pt reports generalized fatigue along with usual neck/lower back soreness. States that he remains stressed due to occupational demands.    Pain Scale: Gee rates pain on a scale of 0-10 to be 3 currently to (R) side thorax, neck and lower back..    Objective     Gee received individual therapeutic exercises to develop cervical/postural/lumbarstrength, endurance, ROM, flexibility, posture and core stabilization for 45 minutes including:    Session initiated with UBE  x 6 minutes total ( 3 minutes forward/backward) level 1    Standing ex's  · Corner Pec stretch 2 x 30  sec  · Deep neck flexion with ball on the wall 2 x 10  · Rows On cable cross with 10# 2 x 10  · Shoulder ext on cable cross with 7#  2 x 10   · Brueggers with GTB 2 x 10   · Lower trap lift off against wall x 10   · Middle trap lift off against wall x 10-   · Patient educated in and performs floor<->waist lift of 10# box with good technique--NP.  · Serratus wall slide + red Zokea band  1 x 10 , 1 x 6 stopped due to pain.  · Cable cross push/pull with 7# 2 x 10 each     Supine ex's  · Chin tucks 10 x 5 sec with towel under head + alt arm flex with 1#  --NP  · Cervical rotation B 10 x 5s ea side   · Gentle cervical isometrics flex and extension 5 x 5s--NP  · Gentle cervical rotation isometrics 5 x 5s--NP  · Piriformis stretch 3 x 20s--NP  · Pelvic tilt with hip abd/ER with  BTB 20x   · Pelvic tilt with Hip add isometric 20x   · Bridging +  iso hip abd BTB 1 x 20-   · Pelvic tilt with Tband pull down (GTB) 2 x 10     Sidelying ex's:   · Clams 2 x 10--NP    Gee received the following manual therapy techniques: Soft tissue Mobilization were applied to the: cervical paraspinals for 5 minutes including:  (NP)  STM cervical paraspinals/UT    Patient receives moist heat applied to cervical area x 5 minutes for muscle relaxation prior to traction (NP)    Patient received mechanical cervical traction  for pain control with a  static pull @ 15 lbs, with a 15 degree angle of pull for 10 minutes.      Patient education:  Patient educated to continue with previously issued HEP to tolerance.  Gee verbalized good understanding of education provided.   No spiritual or educational barriers to learning provided    Assessment     Patient talib TX fairly well.  He was able to perform the above ex's/activities within tolerable level of discomfort and fatigue.  Serratus wall slides not performed due to previous discomfort with this.  Notes good relief of neck pain with mechanical cervical traction unfortunately, relief has only been temporary. He reports increased stress with his occupational demands which contributes to chronic pain. .  Pain level = 3/10 post Tx.. Will monitor and attempt to progress as tolerated.  This is a 52 y.o. male referred to outpatient physical therapy and presentsith a medical diagnosis of   Encounter Diagnoses   Name Primary?    Neck pain Yes    Chronic bilateral low back pain without sciatica    and demonstrates limitations as described in the problem list.   Pt will continue to benefit from skilled outpatient physical therapy to address the deficits listed in the problem list, provide pt/family education and to maximize pt's level of independence in the home and community environment.     GOALS: Short Term Goals:    - Pt will have pain-free cervical extension in order to show improved mobility.  - Pt will be able to demonstrate proper  lifting from floor to waist of a 10-15# object in order to prevent injury with occupational demands.  - Pt will be consistent and independent with HEP in order to promote carryover between therapy sessions.  - Pt will be able to demonstrate proper sitting posture without therapist cuing in order to decrease pain and improve positional tolerance.     Long Term Goals:   - Pt will be able to stand >5 hours with back and neck pain at less than a 2/10 in order to promote improved tolerance to work demands.  - Pt will increase periscapular strength by 1 ms grade in order to improve performance of functional activities to increase tolerance for ADL and work activities.  - Pt will increase gluteus gaby and medius strength by 1 ms grade in order to improve performance of functional activities to increase tolerance for ADL and work activities.  -  Pt to be Independent with updated HEP to maintain therapy gains following DC.  -  Pt will report improved management of chronic pain with at least a 25% reduction in back and neck pain since start of therapy.        Plan     Continue with established Plan of Care towards PT goals.    Therapist: Julien Grove, PTA  5/30/2018

## 2018-06-04 ENCOUNTER — CLINICAL SUPPORT (OUTPATIENT)
Dept: REHABILITATION | Facility: HOSPITAL | Age: 53
End: 2018-06-04
Attending: INTERNAL MEDICINE
Payer: COMMERCIAL

## 2018-06-04 DIAGNOSIS — M54.2 NECK PAIN: ICD-10-CM

## 2018-06-04 DIAGNOSIS — M54.40 BILATERAL LOW BACK PAIN WITH SCIATICA, SCIATICA LATERALITY UNSPECIFIED, UNSPECIFIED CHRONICITY: ICD-10-CM

## 2018-06-04 PROCEDURE — 97012 MECHANICAL TRACTION THERAPY: CPT | Mod: PO

## 2018-06-04 PROCEDURE — 97110 THERAPEUTIC EXERCISES: CPT | Mod: PO

## 2018-06-04 NOTE — PROGRESS NOTES
Physical Therapy Daily Note     Name: Gee Montez  Clinic Number: [id number]  Diagnosis:   Encounter Diagnoses   Name Primary?    Neck pain     Bilateral low back pain with sciatica, sciatica laterality unspecified, unspecified chronicity      Physician: Omar Arredondo MD  Precautions: standard   Evaluation Date: 4/25/18  Visit # authorized: 11/20  Authorization period: 12/31/18  Plan of care Expiration: 7/18/18  Time In: 4:30 pm  Time Out: 530 pm  Treatment time:55 minutes 1:1 45 minutes    Subjective     Pt reports he wants to keep coming to therapy as he is feeling a little better. He is down to 2 pain pills per day and wants to get down to only 1. His job is very physically demanding, and he has pain everyday at work with lifting heavy objects.  Pain Scale: Gee rates pain on a scale of 0-10 to be 4 currently to (R) side thorax, neck and lower back..    Objective        Cervical Range of Motion:     Degrees Pain   Flexion 54 Slight pain      Extension 65 Pain starts ar 60 deg      Right Rotation 80% Slight pain at end range      Left Rotation 90%  No pain      Right Side Bending 25 Increased pain at R side of neck   Left Side Bending 35 Slight pain         Shoulder Range of Motion:   Shoulder Left Right   Flexion WNLs cracking in spine WNLs cracking in spine   Abduction WNL WNL   ER WNL WNL   IR WNL WNL         - Trunk Flexion: 100% no pain  - Trunk Ext: 50%, increased  - Trunk Rot R: 100%  - Trunk Rot L: 100%     Good B hip flexion PROM, limited with ER        Upper extremity strength: 5/5 generally B     Lower Trap 3+/5 Lower Trap 3+/5   Middle Trap 3+/5 Middle Trap 3+/5      Lower extremity ROM WNLs B  Lower extremity strength 5/5 except glutes 4-/5 (glut med/max)        Special Tests cervical:  Distraction Improved pain   Compression + for pain in base of neck, no radicular symptoms         Joint Mobility:    Hypomobility noted at lower cervical  spine with downglides and upglides, hypomobile cervicothoracic junction        Gee received individual therapeutic exercises to develop cervical/postural/lumbarstrength, endurance, ROM, flexibility, posture and core stabilization for 45 minutes including:    Session initiated with UBE  x 6 minutes total ( 3 minutes forward/backward) level 1    Standing ex's  · Corner Pec stretch 2 x 30  sec  · Deep neck flexion with ball on the wall 2 x 10  · Rows On cable cross with 10# 2 x 10  · Shoulder ext on cable cross with 7#  2 x 10   · Brueggers with GTB 2 x 10   · Lower trap lift off against wall x 10   · Middle trap lift off against wall x 10-   · Patient educated in and performs floor<->waist lift of 10# box with good technique--NP.  · Serratus wall slide + red Zokea band  1 x 10 , 1 x 6 stopped due to pain.  · Cable cross push/pull with 7# 2 x 10 each     Supine ex's  · Cervical rotation B 10 x 5s ea side   · Pelvic tilt with hip abd/ER with  BTB 20x -NP  · Pelvic tilt with Hip add isometric 20x -NP  · TA brace + LTR for obliques contraction 10x  · March with pelvic tilt x 15  · Bridging + iso hip abd BTB 1 x 20  · Pelvic tilt with Tband pull down (GTB) 2 x 10   · Chin tucks 10 x 5 sec with towel under head + alt arm flex with 1#  --NP  · Gentle cervical isometrics flex and extension 5 x 5s--NP  · Gentle cervical rotation isometrics 5 x 5s--NP  · Piriformis stretch 3 x 20s--NP    Gee received the following manual therapy techniques: Soft tissue Mobilization were applied to the: cervical paraspinals for 5 minutes including:  (NP)  STM cervical paraspinals/UT    Patient received mechanical cervical traction  for pain control with a  static pull @ 15 lbs, with a 15 degree angle of pull for 10 minutes.      Patient education:  Patient educated to continue with previously issued HEP to tolerance.  Gee verbalized good understanding of education provided.   No spiritual or educational barriers to learning  provided    Assessment     Patient has made slow progress with therapy at this time. He has made improvements with lumbar and cervical ROM at this time; however, he is still limited with pain and occupational tasks. He continues to require cueing for body awareness throughout therapy session. Increased emphasis on core stabilization with all standing activities today. Pt reporting decreased pain reports and decreased use of pain pills since initiating therapy. Occupational demands remain challenging, and pt is unsure how long he will be able to continue to perform his current job.Pt would continue to benefit from postural and core recruitment and endurance activities at this time.    This is a 52 y.o. male referred to outpatient physical therapy and presentsith a medical diagnosis of   Encounter Diagnoses   Name Primary?    Neck pain Yes    Chronic bilateral low back pain without sciatica    and demonstrates limitations as described in the problem list.   Pt will continue to benefit from skilled outpatient physical therapy to address the deficits listed in the problem list, provide pt/family education and to maximize pt's level of independence in the home and community environment.     GOALS: Short Term Goals:    - Pt will have pain-free cervical extension in order to show improved mobility. Increased by 25%, in progress 6/4/18  - Pt will be able to demonstrate proper lifting from floor to waist of a 10-15# object in order to prevent injury with occupational demands. In progress 6/4/18  - Pt will be consistent and independent with HEP in order to promote carryover between therapy sessions. Met 6/4/18  - Pt will be able to demonstrate proper sitting posture without therapist cuing in order to decrease pain and improve positional tolerance. Met 6/4/18     Long Term Goals:   - Pt will be able to stand >5 hours with back and neck pain at less than a 2/10 in order to promote improved tolerance to work demands.  - Pt  will increase periscapular strength by 1 ms grade in order to improve performance of functional activities to increase tolerance for ADL and work activities.  - Pt will increase gluteus gaby and medius strength by 1 ms grade in order to improve performance of functional activities to increase tolerance for ADL and work activities.  -  Pt to be Independent with updated HEP to maintain therapy gains following DC.  -  Pt will report improved management of chronic pain with at least a 25% reduction in back and neck pain since start of therapy. Met 6/4/18        Plan     Continue with established Plan of Care towards PT goals for an addition 2x/week for 4 weeks.    Therapist: Ashley Holstein, PT  6/4/2018

## 2018-06-04 NOTE — PLAN OF CARE
Physical Therapy Daily Note     Name: Gee Montez  Clinic Number: [id number]  Diagnosis:   Encounter Diagnoses   Name Primary?    Neck pain     Bilateral low back pain with sciatica, sciatica laterality unspecified, unspecified chronicity      Physician: Omar Arredondo MD  Precautions: standard   Evaluation Date: 4/25/18  Visit # authorized: 11/20  Authorization period: 12/31/18  Plan of care Expiration: 7/18/18  Time In: 4:30 pm  Time Out: 530 pm  Treatment time:55 minutes 1:1 45 minutes    Subjective     Pt reports he wants to keep coming to therapy as he is feeling a little better. He is down to 2 pain pills per day and wants to get down to only 1. His job is very physically demanding, and he has pain everyday at work with lifting heavy objects.  Pain Scale: Gee rates pain on a scale of 0-10 to be 4 currently to (R) side thorax, neck and lower back..    Objective        Cervical Range of Motion:     Degrees Pain   Flexion 54 Slight pain      Extension 65 Pain starts ar 60 deg      Right Rotation 80% Slight pain at end range      Left Rotation 90%  No pain      Right Side Bending 25 Increased pain at R side of neck   Left Side Bending 35 Slight pain         Shoulder Range of Motion:   Shoulder Left Right   Flexion WNLs cracking in spine WNLs cracking in spine   Abduction WNL WNL   ER WNL WNL   IR WNL WNL         - Trunk Flexion: 100% no pain  - Trunk Ext: 50%, increased  - Trunk Rot R: 100%  - Trunk Rot L: 100%     Good B hip flexion PROM, limited with ER        Upper extremity strength: 5/5 generally B     Lower Trap 3+/5 Lower Trap 3+/5   Middle Trap 3+/5 Middle Trap 3+/5      Lower extremity ROM WNLs B  Lower extremity strength 5/5 except glutes 4-/5 (glut med/max)        Special Tests cervical:  Distraction Improved pain   Compression + for pain in base of neck, no radicular symptoms         Joint Mobility:    Hypomobility noted at lower cervical  spine with downglides and upglides, hypomobile cervicothoracic junction        Gee received individual therapeutic exercises to develop cervical/postural/lumbarstrength, endurance, ROM, flexibility, posture and core stabilization for 45 minutes including:    Session initiated with UBE  x 6 minutes total ( 3 minutes forward/backward) level 1    Standing ex's  · Corner Pec stretch 2 x 30  sec  · Deep neck flexion with ball on the wall 2 x 10  · Rows On cable cross with 10# 2 x 10  · Shoulder ext on cable cross with 7#  2 x 10   · Brueggers with GTB 2 x 10   · Lower trap lift off against wall x 10   · Middle trap lift off against wall x 10-   · Patient educated in and performs floor<->waist lift of 10# box with good technique--NP.  · Serratus wall slide + red Zokea band  1 x 10 , 1 x 6 stopped due to pain.  · Cable cross push/pull with 7# 2 x 10 each     Supine ex's  · Cervical rotation B 10 x 5s ea side   · Pelvic tilt with hip abd/ER with  BTB 20x -NP  · Pelvic tilt with Hip add isometric 20x -NP  · TA brace + LTR for obliques contraction 10x  · March with pelvic tilt x 15  · Bridging + iso hip abd BTB 1 x 20  · Pelvic tilt with Tband pull down (GTB) 2 x 10   · Chin tucks 10 x 5 sec with towel under head + alt arm flex with 1#  --NP  · Gentle cervical isometrics flex and extension 5 x 5s--NP  · Gentle cervical rotation isometrics 5 x 5s--NP  · Piriformis stretch 3 x 20s--NP    Gee received the following manual therapy techniques: Soft tissue Mobilization were applied to the: cervical paraspinals for 5 minutes including:  (NP)  STM cervical paraspinals/UT    Patient received mechanical cervical traction  for pain control with a  static pull @ 15 lbs, with a 15 degree angle of pull for 10 minutes.      Patient education:  Patient educated to continue with previously issued HEP to tolerance.  Gee verbalized good understanding of education provided.   No spiritual or educational barriers to learning  provided    Assessment     Patient has made slow progress with therapy at this time. He has made improvements with lumbar and cervical ROM at this time; however, he is still limited with pain and occupational tasks. He continues to require cueing for body awareness throughout therapy session. Increased emphasis on core stabilization with all standing activities today. Pt reporting decreased pain reports and decreased use of pain pills since initiating therapy. Occupational demands remain challenging, and pt is unsure how long he will be able to continue to perform his current job.Pt would continue to benefit from postural and core recruitment and endurance activities at this time.    This is a 52 y.o. male referred to outpatient physical therapy and presentsith a medical diagnosis of   Encounter Diagnoses   Name Primary?    Neck pain Yes    Chronic bilateral low back pain without sciatica    and demonstrates limitations as described in the problem list.   Pt will continue to benefit from skilled outpatient physical therapy to address the deficits listed in the problem list, provide pt/family education and to maximize pt's level of independence in the home and community environment.     GOALS: Short Term Goals:    - Pt will have pain-free cervical extension in order to show improved mobility. Increased by 25%, in progress 6/4/18  - Pt will be able to demonstrate proper lifting from floor to waist of a 10-15# object in order to prevent injury with occupational demands. In progress 6/4/18  - Pt will be consistent and independent with HEP in order to promote carryover between therapy sessions. Met 6/4/18  - Pt will be able to demonstrate proper sitting posture without therapist cuing in order to decrease pain and improve positional tolerance. Met 6/4/18     Long Term Goals:   - Pt will be able to stand >5 hours with back and neck pain at less than a 2/10 in order to promote improved tolerance to work demands.  - Pt  will increase periscapular strength by 1 ms grade in order to improve performance of functional activities to increase tolerance for ADL and work activities.  - Pt will increase gluteus gaby and medius strength by 1 ms grade in order to improve performance of functional activities to increase tolerance for ADL and work activities.  -  Pt to be Independent with updated HEP to maintain therapy gains following DC.  -  Pt will report improved management of chronic pain with at least a 25% reduction in back and neck pain since start of therapy. Met 6/4/18        Plan     Continue with established Plan of Care towards PT goals for an addition 2x/week for 4 weeks.    Therapist: Ashley Holstein, PT  6/4/2018

## 2018-06-07 ENCOUNTER — CLINICAL SUPPORT (OUTPATIENT)
Dept: REHABILITATION | Facility: HOSPITAL | Age: 53
End: 2018-06-07
Attending: INTERNAL MEDICINE
Payer: COMMERCIAL

## 2018-06-07 DIAGNOSIS — M54.2 NECK PAIN: ICD-10-CM

## 2018-06-07 DIAGNOSIS — M54.40 BILATERAL LOW BACK PAIN WITH SCIATICA, SCIATICA LATERALITY UNSPECIFIED, UNSPECIFIED CHRONICITY: ICD-10-CM

## 2018-06-07 PROCEDURE — 97012 MECHANICAL TRACTION THERAPY: CPT | Mod: PO

## 2018-06-07 PROCEDURE — 97110 THERAPEUTIC EXERCISES: CPT | Mod: PO

## 2018-06-07 NOTE — PROGRESS NOTES
Physical Therapy Daily Note     Name: Gee Montez  Clinic Number: [id number]  Diagnosis:   Encounter Diagnoses   Name Primary?    Neck pain     Bilateral low back pain with sciatica, sciatica laterality unspecified, unspecified chronicity      Physician: Omar Arredondo MD  Precautions: standard   Evaluation Date: 4/25/18  Visit # authorized: 12/20  Authorization period: 12/31/18  Plan of care Expiration: 7/18/18  Time In: 4:30 pm  Time Out: 530 pm  Treatment time:55 minutes 1:1 45 minutes    Subjective     Pt reports he had to move a lot of merchandise at work this morning so he is sore.  Pain Scale: Gee rates pain on a scale of 0-10 to be 4 currently to (R) side thorax, neck and lower back..    Objective       Gee received individual therapeutic exercises to develop cervical/postural/lumbarstrength, endurance, ROM, flexibility, posture and core stabilization for 45 minutes including:    Session initiated with UBE  x 6 minutes total ( 3 minutes forward/backward) level 1    Standing ex's  · Corner Pec stretch 2 x 30  sec  · Deep neck flexion with ball on the wall 2 x 10  · Rows On cable cross with 10# 2 x 10  · Shoulder ext on cable cross with 7#  2 x 10   · Brueggers with GTB 2 x 10   · Lower trap lift off against wall x 10   · Middle trap lift off against wall x 10  · Patient educated in and performs floor<->waist lift of 10# box with good technique--NP.  · Serratus wall slide + red Zokea band  1 x 10 , 1 x 6 stopped due to pain- NP  · Cable cross push/pull with 7# 2 x 10 each     Supine ex's  · Cervical rotation B 10 x 5s ea side   · Pelvic tilt with hip abd/ER with  BTB 20x -NP  · Pelvic tilt with Hip add isometric 20x -NP  · TA brace + LTR for obliques contraction 10x  · March with pelvic tilt x 15  · Bridging + iso hip abd BTB 1 x 20  · Pelvic tilt with Tband pull down (GTB) 2 x 10   · Chin tucks 10 x 5 sec with towel under head + alt arm flex  with 1#  --NP  · Gentle cervical isometrics flex and extension 5 x 5s--NP  · Gentle cervical rotation isometrics 5 x 5s--NP  · Piriformis stretch 3 x 20s--NP    Gee received the following manual therapy techniques: Soft tissue Mobilization were applied to the: cervical paraspinals for 5 minutes including:   STM cervical paraspinals/UT    Patient received mechanical cervical traction  for pain control with a  static pull @ 15 lbs, with a 15 degree angle of pull for 10 minutes.      Patient education:  Patient educated to continue with previously issued HEP to tolerance.  Gee verbalized good understanding of education provided.   No spiritual or educational barriers to learning provided    Assessment      Exercises remained unchanged today as patient had a busy day at work and reported he was having a more difficult day with his pain. He was able to perform the above activities without an increase in discomfort, however. He continues to get relief with cervical traction at this time. Reduction in pain to 2-3/10 reported after session..Pt would continue to benefit from postural and core recruitment and endurance activities at this time.    This is a 52 y.o. male referred to outpatient physical therapy and presentsith a medical diagnosis of   Encounter Diagnoses   Name Primary?    Neck pain Yes    Chronic bilateral low back pain without sciatica    and demonstrates limitations as described in the problem list.   Pt will continue to benefit from skilled outpatient physical therapy to address the deficits listed in the problem list, provide pt/family education and to maximize pt's level of independence in the home and community environment.     GOALS: Short Term Goals:    - Pt will have pain-free cervical extension in order to show improved mobility. Increased by 25%, in progress 6/4/18  - Pt will be able to demonstrate proper lifting from floor to waist of a 10-15# object in order to prevent injury with  occupational demands. In progress 6/4/18  - Pt will be consistent and independent with HEP in order to promote carryover between therapy sessions. Met 6/4/18  - Pt will be able to demonstrate proper sitting posture without therapist cuing in order to decrease pain and improve positional tolerance. Met 6/4/18     Long Term Goals:   - Pt will be able to stand >5 hours with back and neck pain at less than a 2/10 in order to promote improved tolerance to work demands.  - Pt will increase periscapular strength by 1 ms grade in order to improve performance of functional activities to increase tolerance for ADL and work activities.  - Pt will increase gluteus gaby and medius strength by 1 ms grade in order to improve performance of functional activities to increase tolerance for ADL and work activities.  -  Pt to be Independent with updated HEP to maintain therapy gains following DC.  -  Pt will report improved management of chronic pain with at least a 25% reduction in back and neck pain since start of therapy. Met 6/4/18        Plan     Continue with established Plan of Care towards PT goals for an addition 2x/week for 4 weeks.    Therapist: Ashley Holstein, PT  6/7/2018

## 2018-06-11 ENCOUNTER — CLINICAL SUPPORT (OUTPATIENT)
Dept: REHABILITATION | Facility: HOSPITAL | Age: 53
End: 2018-06-11
Attending: INTERNAL MEDICINE
Payer: COMMERCIAL

## 2018-06-11 DIAGNOSIS — M54.2 NECK PAIN: ICD-10-CM

## 2018-06-11 DIAGNOSIS — M54.40 BILATERAL LOW BACK PAIN WITH SCIATICA, SCIATICA LATERALITY UNSPECIFIED, UNSPECIFIED CHRONICITY: ICD-10-CM

## 2018-06-11 PROCEDURE — 97110 THERAPEUTIC EXERCISES: CPT | Mod: PO

## 2018-06-11 PROCEDURE — 97012 MECHANICAL TRACTION THERAPY: CPT | Mod: PO

## 2018-06-11 PROCEDURE — 97140 MANUAL THERAPY 1/> REGIONS: CPT | Mod: PO

## 2018-06-11 NOTE — PROGRESS NOTES
"                                                    Physical Therapy Daily Note     Name: Gee Montez  Clinic Number: [id number]  Diagnosis:   Encounter Diagnoses   Name Primary?    Neck pain     Bilateral low back pain with sciatica, sciatica laterality unspecified, unspecified chronicity      Physician: Omar Arredondo MD  Precautions: standard   Evaluation Date: 4/25/18  Visit # authorized: 13/20  Authorization period: 12/31/18  Plan of care Expiration: 7/18/18  Time In: 310 pm  Time Out: 400 pm  Treatment time:50 minutes     Subjective     Pt reports he had to move heavy friers by himself prior to therapy today. His shoulders and B elbows are sore. "I don't think I can do this job much longer."  Pain Scale: Gee rates pain on a scale of 0-10 to be 5 currently to (R) side thorax, neck and lower back..    Objective       Gee received individual therapeutic exercises to develop cervical/postural/lumbarstrength, endurance, ROM, flexibility, posture and core stabilization for 30 minutes including:    Session initiated with UBE  x 6 minutes total ( 3 minutes forward/backward) level 1    Standing ex's  · Corner Pec stretch 2 x 30  sec  · Deep neck flexion with ball on the wall 2 x 10  · Rows On cable cross with 10# 2 x 10  · Shoulder ext on cable cross with 7#  2 x 10   · Brueggers with GTB 2 x 10   · Cable cross push/pull with 7# 2 x 10 each   · Chops on cable cross x 10 7# B in half knee on blue foam pad  · Lower trap lift off against wall x 10-NP  · Middle trap lift off against wall x 10-NP  · Patient educated in and performs floor<->waist lift of 10# box with good technique--NP.    Add shuttle DL, hip extension on shuttle, lateral walks, palloff press    Supine ex's  · Cervical rotation B 10 x 5s ea side   · TA brace + LTR for obliques contraction 10x  · March with pelvic tilt x 15  · Bridging + iso hip abd BTB 1 x 20  · Pelvic tilt with Tband pull down (GTB) 2 x 10   · Chin tucks 10 x 5 sec with towel " under head + alt arm flex with 1#  --NP  · Gentle cervical isometrics flex and extension 5 x 5s--NP  · Gentle cervical rotation isometrics 5 x 5s--NP  · Piriformis stretch 3 x 20s--NP  · Pelvic tilt with hip abd/ER with  BTB 20x -NP  · Pelvic tilt with Hip add isometric 20x -NP    Gee received the following manual therapy techniques: Soft tissue Mobilization were applied to the: cervical paraspinals for 10 minutes including:   STM cervical paraspinals/UT  upglides and downglides c4-7    Patient received mechanical cervical traction  for pain control with a  static pull @ 15 lbs, with a 15 degree angle of pull for 10 minutes.      Patient education:  Patient educated to continue with previously issued HEP to tolerance.  Gee verbalized good understanding of education provided.   No spiritual or educational barriers to learning provided    Assessment     Added chops in kneeling today to continue to address core stabilization and mimic activities which pt has to perform at his job. Improvements in neck pain reported after manual therapy today. He requires occasional cueing for posture and technique throughout tx session. Pt is limited in therex performance by general body aches and pain which are exacerbated by his physically demanding job. Pt would continue to benefit from postural and core recruitment and endurance activities at this time.    This is a 52 y.o. male referred to outpatient physical therapy and presentsith a medical diagnosis of   Encounter Diagnoses   Name Primary?    Neck pain Yes    Chronic bilateral low back pain without sciatica    and demonstrates limitations as described in the problem list.   Pt will continue to benefit from skilled outpatient physical therapy to address the deficits listed in the problem list, provide pt/family education and to maximize pt's level of independence in the home and community environment.     GOALS: Short Term Goals:    - Pt will have pain-free cervical  extension in order to show improved mobility. Increased by 25%, in progress 6/4/18  - Pt will be able to demonstrate proper lifting from floor to waist of a 10-15# object in order to prevent injury with occupational demands. In progress 6/4/18  - Pt will be consistent and independent with HEP in order to promote carryover between therapy sessions. Met 6/4/18  - Pt will be able to demonstrate proper sitting posture without therapist cuing in order to decrease pain and improve positional tolerance. Met 6/4/18     Long Term Goals:   - Pt will be able to stand >5 hours with back and neck pain at less than a 2/10 in order to promote improved tolerance to work demands.  - Pt will increase periscapular strength by 1 ms grade in order to improve performance of functional activities to increase tolerance for ADL and work activities.  - Pt will increase gluteus gaby and medius strength by 1 ms grade in order to improve performance of functional activities to increase tolerance for ADL and work activities.  -  Pt to be Independent with updated HEP to maintain therapy gains following DC.  -  Pt will report improved management of chronic pain with at least a 25% reduction in back and neck pain since start of therapy. Met 6/4/18        Plan     Continue with established Plan of Care towards PT goals for an addition 2x/week for 4 weeks.    Therapist: Ashley Holstein, PT  6/11/2018

## 2018-06-15 ENCOUNTER — CLINICAL SUPPORT (OUTPATIENT)
Dept: REHABILITATION | Facility: HOSPITAL | Age: 53
End: 2018-06-15
Attending: INTERNAL MEDICINE
Payer: COMMERCIAL

## 2018-06-15 DIAGNOSIS — M54.40 BILATERAL LOW BACK PAIN WITH SCIATICA, SCIATICA LATERALITY UNSPECIFIED, UNSPECIFIED CHRONICITY: ICD-10-CM

## 2018-06-15 DIAGNOSIS — M54.2 NECK PAIN: ICD-10-CM

## 2018-06-15 PROCEDURE — 97110 THERAPEUTIC EXERCISES: CPT | Mod: PO

## 2018-06-15 PROCEDURE — 97012 MECHANICAL TRACTION THERAPY: CPT | Mod: PO

## 2018-06-15 NOTE — PROGRESS NOTES
Physical Therapy Daily Note     Name: Gee Montez  Clinic Number: [id number]  Diagnosis:   Encounter Diagnoses   Name Primary?    Neck pain     Bilateral low back pain with sciatica, sciatica laterality unspecified, unspecified chronicity      Physician: Omar Arredondo MD  Precautions: standard   Evaluation Date: 4/25/18  Visit # authorized: 14/20  Authorization period: 12/31/18  Plan of care Expiration: 7/18/18  Time In: 2;00    Time Out: 3:00  Treatment time:60 minutes     Subjective     Pt reports continued neck/lower back pain. Reports increased stress due to occupational demands which contributes to chronic pain symptoms. .     Pain Scale: Gee rates pain on a scale of 0-10 to be 5 currently to (R) side thorax, neck and lower back..    Objective       Gee received individual therapeutic exercises to develop cervical/postural/lumbarstrength, endurance, ROM, flexibility, posture and core stabilization for 50 minutes including:    Session initiated with UBE  x 6 minutes total ( 3 minutes forward/backward) level 1    Standing ex's  · Corner Pec stretch 2 x 30  sec  · Deep neck flexion with ball on the wall 2 x 10  · Rows On cable cross with 10# 2 x 10  · Shoulder ext on cable cross with 7#  2 x 10   · Brueggers with GTB 2 x 10   · Cable cross push/pull with 7# 2 x 10 each   · Chops downs on cable cross x 10 7# B in half knee on blue foam pad  · Lower trap lift off against wall x 10-NP  · Middle trap lift off against wall x 10-NP  · Paloff core press with GTB 2 x 10       Machines  · SHuttle leg press with 3 bands + Iso hip abd BTB 2 x 10  · Shuttle kick backs with 1 red band 2 x 10  ·     Supine ex's (NP today  · Cervical rotation B 10 x 5s ea side   · TA brace + LTR for obliques contraction 10x  · March with pelvic tilt x 15  · Bridging + iso hip abd BTB 1 x 20  · Pelvic tilt with Tband pull down (GTB) 2 x 10   · Chin tucks 10 x 5 sec with towel under  head + alt arm flex with 1#  --NP  · Gentle cervical isometrics flex and extension 5 x 5s--NP  · Gentle cervical rotation isometrics 5 x 5s--NP  · Piriformis stretch 3 x 20s--NP  · Pelvic tilt with hip abd/ER with  BTB 20x -NP  · Pelvic tilt with Hip add isometric 20x -NP    Gee received the following manual therapy techniques: Soft tissue Mobilization were applied to the: cervical paraspinals for 10 minutes including: (NP  STM cervical paraspinals/UT  upglides and downglides c4-7    Patient received mechanical cervical traction  for pain control with a  static pull @ 15 lbs, with a 15 degree angle of pull for 10 minutes.      Patient education:  Patient educated to continue with previously issued HEP to tolerance.  Gee verbalized good understanding of education provided.   No spiritual or educational barriers to learning provided    Assessment   Patient talib Tx fairly well. He was progressed to perform additional core/hip/UE ex's today within tolerable level of fatigue without worsening of neck or back symptoms.  Improvements in neck pain reported after mechanical traction. Unfortunately, relief is temporary and he remains limited by general body aches and pain which are exacerbated by his physically demanding job. Pt would continue to benefit from postural and core recruitment and endurance activities at this time.    This is a 52 y.o. male referred to outpatient physical therapy and presentsith a medical diagnosis of   Encounter Diagnoses   Name Primary?    Neck pain Yes    Chronic bilateral low back pain without sciatica    and demonstrates limitations as described in the problem list.   Pt will continue to benefit from skilled outpatient physical therapy to address the deficits listed in the problem list, provide pt/family education and to maximize pt's level of independence in the home and community environment.     GOALS: Short Term Goals:    - Pt will have pain-free cervical extension in order to  show improved mobility. Increased by 25%, in progress 6/4/18  - Pt will be able to demonstrate proper lifting from floor to waist of a 10-15# object in order to prevent injury with occupational demands. In progress 6/4/18  - Pt will be consistent and independent with HEP in order to promote carryover between therapy sessions. Met 6/4/18  - Pt will be able to demonstrate proper sitting posture without therapist cuing in order to decrease pain and improve positional tolerance. Met 6/4/18     Long Term Goals:   - Pt will be able to stand >5 hours with back and neck pain at less than a 2/10 in order to promote improved tolerance to work demands.  - Pt will increase periscapular strength by 1 ms grade in order to improve performance of functional activities to increase tolerance for ADL and work activities.  - Pt will increase gluteus gaby and medius strength by 1 ms grade in order to improve performance of functional activities to increase tolerance for ADL and work activities.  -  Pt to be Independent with updated HEP to maintain therapy gains following DC.  -  Pt will report improved management of chronic pain with at least a 25% reduction in back and neck pain since start of therapy. Met 6/4/18        Plan     Continue with established Plan of Care towards PT goals for an addition 2x/week for 4 weeks.    Therapist: Julien Grove, PTA  6/15/2018

## 2018-06-18 ENCOUNTER — CLINICAL SUPPORT (OUTPATIENT)
Dept: REHABILITATION | Facility: HOSPITAL | Age: 53
End: 2018-06-18
Attending: INTERNAL MEDICINE
Payer: COMMERCIAL

## 2018-06-18 DIAGNOSIS — M54.2 NECK PAIN: ICD-10-CM

## 2018-06-18 PROCEDURE — 97012 MECHANICAL TRACTION THERAPY: CPT | Mod: PO

## 2018-06-18 PROCEDURE — 97110 THERAPEUTIC EXERCISES: CPT | Mod: PO

## 2018-06-18 NOTE — PROGRESS NOTES
Physical Therapy Daily Note     Name: Gee Montez  Clinic Number: [id number]  Diagnosis:   Encounter Diagnosis   Name Primary?    Neck pain      Physician: Omar Arredondo MD  Precautions: standard   Evaluation Date: 4/25/18  Visit # authorized: 15/20  Authorization period: 12/31/18  Plan of care Expiration: 7/18/18  Time In: 3:05    Time Out: 4:15  Treatment time:50 minutes     Subjective     Pt reports Continued neck/lower back pain/shoulder pain.  Pt reports he feels like the traction really helps the neck.  Pt reports he feels like he doesn't need the second dose of tramadol all the time.  Pt reports he had a headache all day yesterday so he had to take 3 advil and didn't take the tramadol.  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    Pain Scale: Gee rates pain on a scale of 0-10 to be 2-3/10 in the neck, 5-6/10 low back    Objective       Gee received individual therapeutic exercises to develop cervical/postural/lumbarstrength, endurance, ROM, flexibility, posture and core stabilization for 50 minutes including:    Session initiated with UBE  x 6 minutes total ( 3 minutes forward/backward) level 2    Standing ex's  · Corner Pec stretch 2 x 30  sec  · Deep neck flexion with ball on the wall 2 x 10  · Rows On cable cross with 10# 2 x 10  · Shoulder ext on cable cross with 7#  2 x 10   · Brueggers with GTB 2 x 10   · Cable cross push/pull with 7# 2 x 10 each- NP  · Chops downs on cable cross x 15 10# B in half knee on blue foam pad  · Paloff core press at cable cross, #10, x 15  · Lower trap lift off against wall x 10-NP  · Middle trap lift off against wall x 10-NP         Machines  · Shuttle leg press with 3.5 bands + Iso hip abd BTB 2 x 10  · Shuttle kick backs with 1 red band 2 x 10  · Sideline shuttle with 1 black band x 15      Supine ex's (NP  today  · Cervical rotation B 10 x 5s ea side   · TA brace + LTR for obliques contraction 10x  · March with pelvic tilt x 15  · Bridging + iso hip abd BTB 1 x 20  · Pelvic tilt with Tband pull down (GTB) 2 x 10   · Chin tucks 10 x 5 sec with towel under head + alt arm flex with 1#  --NP  · Gentle cervical isometrics flex and extension 5 x 5s--NP  · Gentle cervical rotation isometrics 5 x 5s--NP  · Piriformis stretch 3 x 20s--NP  · Pelvic tilt with hip abd/ER with  BTB 20x -NP  · Pelvic tilt with Hip add isometric 20x -NP    Gee received the following manual therapy techniques: Soft tissue Mobilization were applied to the: cervical paraspinals for 10 minutes including: (NP  STM cervical paraspinals/UT  upglides and downglides c4-7    Patient received mechanical cervical traction  for pain control with a  static pull @ 15 lbs, with a 15 degree angle of pull for 12 minutes.      Patient education:  Patient educated to continue with previously issued HEP to tolerance.  Gee verbalized good understanding of education provided.   No spiritual or educational barriers to learning provided    Assessment   Patient talib Tx fairly well.  Pt with no reports of pain in shoulders with exercises.   Pt would continue to benefit from postural and core recruitment and endurance activities at this time.      Pt will continue to benefit from skilled outpatient physical therapy to address the deficits listed in the problem list, provide pt/family education and to maximize pt's level of independence in the home and community environment.     GOALS: Short Term Goals:    - Pt will have pain-free cervical extension in order to show improved mobility. Increased by 25%, in progress 6/4/18  - Pt will be able to demonstrate proper lifting from floor to waist of a 10-15# object in order to prevent injury with occupational demands. In progress 6/4/18  - Pt will be consistent and independent with HEP in order to promote carryover between  therapy sessions. Met 6/4/18  - Pt will be able to demonstrate proper sitting posture without therapist cuing in order to decrease pain and improve positional tolerance. Met 6/4/18     Long Term Goals:   - Pt will be able to stand >5 hours with back and neck pain at less than a 2/10 in order to promote improved tolerance to work demands.  - Pt will increase periscapular strength by 1 ms grade in order to improve performance of functional activities to increase tolerance for ADL and work activities.  - Pt will increase gluteus gaby and medius strength by 1 ms grade in order to improve performance of functional activities to increase tolerance for ADL and work activities.  -  Pt to be Independent with updated HEP to maintain therapy gains following DC.  -  Pt will report improved management of chronic pain with at least a 25% reduction in back and neck pain since start of therapy. Met 6/4/18        Plan     Continue with established Plan of Care towards PT goals for an addition 2x/week for 4 weeks.

## 2018-06-20 ENCOUNTER — CLINICAL SUPPORT (OUTPATIENT)
Dept: REHABILITATION | Facility: HOSPITAL | Age: 53
End: 2018-06-20
Attending: INTERNAL MEDICINE
Payer: COMMERCIAL

## 2018-06-20 DIAGNOSIS — M54.5 CHRONIC BILATERAL LOW BACK PAIN, WITH SCIATICA PRESENCE UNSPECIFIED: ICD-10-CM

## 2018-06-20 DIAGNOSIS — M54.2 NECK PAIN: ICD-10-CM

## 2018-06-20 DIAGNOSIS — G89.29 CHRONIC BILATERAL LOW BACK PAIN, WITH SCIATICA PRESENCE UNSPECIFIED: ICD-10-CM

## 2018-06-20 PROCEDURE — 97110 THERAPEUTIC EXERCISES: CPT | Mod: PO

## 2018-06-20 PROCEDURE — 97012 MECHANICAL TRACTION THERAPY: CPT | Mod: PO

## 2018-06-20 NOTE — PROGRESS NOTES
Physical Therapy Daily Note     Name: Gee Montez  Clinic Number: [id number]  Diagnosis:   Encounter Diagnoses   Name Primary?    Neck pain     Chronic bilateral low back pain, with sciatica presence unspecified      Physician: Omar Arredondo MD  Precautions: standard   Evaluation Date: 4/25/18  Visit # authorized: 16/20  Authorization period: 12/31/18  Plan of care Expiration: 7/18/18  Time In: 3:00   Time Out:4:00  Treatment time:60 minutes     Subjective     Pt reports continued neck/lower back pain. Reports continued increased stress due to occupational demands which contributes to chronic pain symptoms. .     Pain Scale: Gee rates pain on a scale of 0-10 to be 5 currently to (R) side thorax, neck and lower back..    Objective       Gee received individual therapeutic exercises to develop cervical/postural/lumbarstrength, endurance, ROM, flexibility, posture and core stabilization for 50 minutes including:    Session initiated with UBE x 6 minutes total ( 3 minutes forward/backward) level 1    Standing ex's  · Corner Pec stretch 2 x 30  sec  · Deep neck flexion with ball on the wall 2 x 10  · Rows On cable cross with 10# 2 x 12  · Shoulder ext on cable cross with 7#  2 x 12  · Brueggers with GTB 2 x 10   · Cable cross push/pull with 10#2 x 10 each   · Chops downs on cable cross x 10 10# B in half knee on blue foam pad  · Lower trap lift off against wall x 10-NP  · Middle trap lift off against wall x 10-NP  · Paloff core press on cable cross with 10# 2 x 10     Machines  · SHuttle leg press with 4 bands + Iso hip abd BTB 2 x 10  Shuttle kick backs with 1 black band 2 x 10    Patient received mechanical cervical traction  for pain control with a  static pull @ 18 lbs, with a 15 degree angle of pull for 10 minutes.        Supine ex's (NP today  · Cervical rotation B 10 x 5s ea side   · TA brace + LTR for obliques contraction 10x  · March with pelvic  tilt x 15  · Bridging + iso hip abd BTB 1 x 20  · Pelvic tilt with Tband pull down (GTB) 2 x 10   · Chin tucks 10 x 5 sec with towel under head + alt arm flex with 1#  --NP  · Gentle cervical isometrics flex and extension 5 x 5s--NP  · Gentle cervical rotation isometrics 5 x 5s--NP  · Piriformis stretch 3 x 20s--NP  · Pelvic tilt with hip abd/ER with  BTB 20x -NP  · Pelvic tilt with Hip add isometric 20x -NP    Gee received the following manual therapy techniques: Soft tissue Mobilization were applied to the: cervical paraspinals for 10 minutes including: (NP)  STM cervical paraspinals/UT  upglides and downglides c4-7    Patient education:  Patient educated to continue with previously issued HEP to tolerance.  Gee verbalized good understanding of education provided.   No spiritual or educational barriers to learning provided    Assessment   Patient talib Tx fairly well. He was progressed to perform slight increased resistance for some ex's today within tolerable level of fatigue without worsening of neck or back symptoms. Continues to report Improvements in neck pain reported after mechanical traction. Unfortunately, relief is temporary and he remains limited by general body aches and pain which are exacerbated by his physically demanding job. Pt would continue to benefit from postural and core recruitment and endurance activities at this time.    This is a 52 y.o. male referred to outpatient physical therapy and presentsith a medical diagnosis of   Encounter Diagnoses   Name Primary?    Neck pain Yes    Chronic bilateral low back pain without sciatica    and demonstrates limitations as described in the problem list.   Pt will continue to benefit from skilled outpatient physical therapy to address the deficits listed in the problem list, provide pt/family education and to maximize pt's level of independence in the home and community environment.     GOALS: Short Term Goals:    - Pt will have pain-free  cervical extension in order to show improved mobility. Increased by 25%, in progress 6/4/18  - Pt will be able to demonstrate proper lifting from floor to waist of a 10-15# object in order to prevent injury with occupational demands. In progress 6/4/18  - Pt will be consistent and independent with HEP in order to promote carryover between therapy sessions. Met 6/4/18  - Pt will be able to demonstrate proper sitting posture without therapist cuing in order to decrease pain and improve positional tolerance. Met 6/4/18     Long Term Goals:   - Pt will be able to stand >5 hours with back and neck pain at less than a 2/10 in order to promote improved tolerance to work demands.  - Pt will increase periscapular strength by 1 ms grade in order to improve performance of functional activities to increase tolerance for ADL and work activities.  - Pt will increase gluteus gaby and medius strength by 1 ms grade in order to improve performance of functional activities to increase tolerance for ADL and work activities.  -  Pt to be Independent with updated HEP to maintain therapy gains following DC.  -  Pt will report improved management of chronic pain with at least a 25% reduction in back and neck pain since start of therapy. Met 6/4/18        Plan     Continue with established Plan of Care towards PT goals for an addition 2x/week for 4 weeks.    Therapist: Julien Grove, PTA  6/20/2018

## 2018-06-27 ENCOUNTER — CLINICAL SUPPORT (OUTPATIENT)
Dept: REHABILITATION | Facility: HOSPITAL | Age: 53
End: 2018-06-27
Attending: INTERNAL MEDICINE
Payer: COMMERCIAL

## 2018-06-27 DIAGNOSIS — M54.40 ACUTE BILATERAL LOW BACK PAIN WITH SCIATICA, SCIATICA LATERALITY UNSPECIFIED: ICD-10-CM

## 2018-06-27 DIAGNOSIS — M54.2 NECK PAIN: ICD-10-CM

## 2018-06-27 PROCEDURE — 97110 THERAPEUTIC EXERCISES: CPT | Mod: PO

## 2018-06-27 PROCEDURE — 97012 MECHANICAL TRACTION THERAPY: CPT | Mod: PO

## 2018-06-27 PROCEDURE — 97140 MANUAL THERAPY 1/> REGIONS: CPT | Mod: PO

## 2018-06-27 NOTE — PROGRESS NOTES
Physical Therapy Daily Note     Name: Gee Montez  Clinic Number: [id number]  Diagnosis:   Encounter Diagnoses   Name Primary?    Neck pain     Acute bilateral low back pain with sciatica, sciatica laterality unspecified      Physician: Omar Arredondo MD  Precautions: standard   Evaluation Date: 4/25/18  Visit # authorized: 17/20  Authorization period: 12/31/18  Plan of care Expiration: 7/18/18  Time In: 3:05  Time Out:4:05  Treatment time:60 minutes     Subjective     Pt reports continued neck/lower back pain. Reports continued increased stress due to occupational demands which contributes to chronic pain symptoms. .    Reports increased neck pain symptoms due to extended time looking up today at work.   Pain Scale: Gee rates pain on a scale of 0-10 to be 6 currently to   neck and lower back..    Objective       Gee received individual therapeutic exercises to develop cervical/postural/lumbarstrength, endurance, ROM, flexibility, posture and core stabilization for 35 minutes including:    Session initiated with UBE x 6 minutes total ( 3 minutes forward/backward) level 1    Standing ex's  · Corner Pec stretch 2 x 30  sec  · Deep neck flexion with ball on the wall 2 x 10  · Rows on cable cross with 10# 2 x 10  · Shoulder ext on cable cross with 7#  2 x 10  · Brueggers with GTB 2 x 10 --NP  · Cable cross push/pull with 10#2 x 10 each   · Chops downs on cable cross x 10 10# B in half knee on blue foam pad  · Lower trap lift off against wall x 10-NP  · Middle trap lift off against wall x 10-NP  · Paloff core press on cable cross with 10# 2 x 10     Machines (NP)  · SHuttle leg press with 4 bands + Iso hip abd BTB 2 x 10  · Shuttle kick backs with 1 black band 2 x 10      Gee received the following manual therapy techniques: Soft tissue Mobilization were applied to the: cervical paraspinals for 10 minutes including:    STM cervical  paraspinals/UT  upglides and downglides c4-7_NP    Patient received mechanical cervical traction  for pain control with a  static pull @ 18 lbs, with a 15 degree angle of pull for 15 minutes.        Supine ex's (NP today  · Cervical rotation B 10 x 5s ea side   · TA brace + LTR for obliques contraction 10x  · March with pelvic tilt x 15  · Bridging + iso hip abd BTB 1 x 20  · Pelvic tilt with Tband pull down (GTB) 2 x 10   · Chin tucks 10 x 5 sec with towel under head + alt arm flex with 1#  --NP  · Gentle cervical isometrics flex and extension 5 x 5s--NP  · Gentle cervical rotation isometrics 5 x 5s--NP  · Piriformis stretch 3 x 20s--NP  · Pelvic tilt with hip abd/ER with  BTB 20x -NP  · Pelvic tilt with Hip add isometric 20x -NP      Patient education:  Patient educated to continue with previously issued HEP to tolerance.  Gee verbalized good understanding of education provided.   No spiritual or educational barriers to learning provided    Assessment   Patient talib Tx fairly well.  TX focused more on neck symptoms today due to exacerbation at work today. Some relief reported with extended traction and manual techniques.   Continues to report Improvements in neck pain reported after mechanical traction. Unfortunately, relief is temporary and he remains limited by general body aches and pain which are exacerbated by his physically demanding job.  Will likely benefit from our [employer] program upon completion of PT which was discussed with primary therapist and patient  Is open to it. He was provided with a copy of our [employer] brochure. Pt would continue to benefit from postural and core recruitment and endurance activities at this time.    This is a 52 y.o. male referred to outpatient physical therapy and presentsith a medical diagnosis of   Encounter Diagnoses   Name Primary?    Neck pain Yes    Chronic bilateral low back pain without sciatica    and demonstrates limitations as described in the  problem list.   Pt will continue to benefit from skilled outpatient physical therapy to address the deficits listed in the problem list, provide pt/family education and to maximize pt's level of independence in the home and community environment.     GOALS: Short Term Goals:    - Pt will have pain-free cervical extension in order to show improved mobility. Increased by 25%, in progress 6/4/18  - Pt will be able to demonstrate proper lifting from floor to waist of a 10-15# object in order to prevent injury with occupational demands. In progress 6/4/18  - Pt will be consistent and independent with HEP in order to promote carryover between therapy sessions. Met 6/4/18  - Pt will be able to demonstrate proper sitting posture without therapist cuing in order to decrease pain and improve positional tolerance. Met 6/4/18     Long Term Goals:   - Pt will be able to stand >5 hours with back and neck pain at less than a 2/10 in order to promote improved tolerance to work demands.  - Pt will increase periscapular strength by 1 ms grade in order to improve performance of functional activities to increase tolerance for ADL and work activities.  - Pt will increase gluteus gaby and medius strength by 1 ms grade in order to improve performance of functional activities to increase tolerance for ADL and work activities.  -  Pt to be Independent with updated HEP to maintain therapy gains following DC.  -  Pt will report improved management of chronic pain with at least a 25% reduction in back and neck pain since start of therapy. Met 6/4/18        Plan     Continue with established Plan of Care towards PT goals for an addition 2x/week for 4 weeks.    Therapist: Julien Grove, PTA  6/27/2018

## 2018-07-02 ENCOUNTER — CLINICAL SUPPORT (OUTPATIENT)
Dept: REHABILITATION | Facility: HOSPITAL | Age: 53
End: 2018-07-02
Attending: INTERNAL MEDICINE
Payer: COMMERCIAL

## 2018-07-02 DIAGNOSIS — M54.40 ACUTE BILATERAL LOW BACK PAIN WITH SCIATICA, SCIATICA LATERALITY UNSPECIFIED: ICD-10-CM

## 2018-07-02 DIAGNOSIS — M54.2 NECK PAIN: ICD-10-CM

## 2018-07-02 PROCEDURE — 97012 MECHANICAL TRACTION THERAPY: CPT | Mod: PO

## 2018-07-02 PROCEDURE — 97140 MANUAL THERAPY 1/> REGIONS: CPT | Mod: PO

## 2018-07-02 PROCEDURE — 97110 THERAPEUTIC EXERCISES: CPT | Mod: PO

## 2018-07-02 NOTE — PROGRESS NOTES
Physical Therapy Daily Note     Name: Gee Montez  Clinic Number: [id number]  Diagnosis:   Encounter Diagnoses   Name Primary?    Neck pain     Acute bilateral low back pain with sciatica, sciatica laterality unspecified      Physician: Omar Arredondo MD  Precautions: standard   Evaluation Date: 4/25/18  Visit # authorized: 18/20  Authorization period: 12/31/18  Plan of care Expiration: 7/18/18  Time In: 12:00  Time Out:1:00  Treatment time:60 minutes     Subjective     Pt reports continued neck/lower back pain. Reports continued increased stress due to occupational demands which contributes to chronic pain symptoms. .    He reports good relief after last session for 1 day after soft tissue work and traction.   Pain Scale: Gee rates pain on a scale of 0-10 to be 5 currently to   neck and lower back..    Objective       Gee received individual therapeutic exercises to develop cervical/postural/lumbarstrength, endurance, ROM, flexibility, posture and core stabilization for 35 minutes including:    Session initiated with UBE x 6 minutes total ( 3 minutes forward/backward) level 2    Standing ex's  · Corner Pec stretch 2 x 30  sec  · Deep neck flexion with ball on the wall 2 x 10  · Rows on cable cross with 10# 2 x 10  · Shoulder ext on cable cross with 7#  2 x 10  · Brueggers with GTB 2 x 10 --NP  · Cable cross push/pull with 10#2 x 10 each   · Chops downs on cable cross 2 x 10 10# B in half knee on blue foam pad  · Lower trap lift off against wall x 10-NP  · Middle trap lift off against wall x 10-NP  · Paloff core press on cable cross  10# 2 x 10     Machines (NP)  · SHuttle leg press with 4 bands + Iso hip abd BTB 2 x 10  · Shuttle kick backs with 1 black band 2 x 10      Gee received the following manual therapy techniques: Soft tissue Mobilization were applied to the: cervical paraspinals for 10 minutes including:    STM cervical  paraspinals/UT  upglides and downglides c4-7_NP    Patient received mechanical cervical traction  for pain control with a  static pull @ 18 lbs, with a 15 degree angle of pull for 15 minutes.        Supine ex's (NP today  · Cervical rotation B 10 x 5s ea side   · TA brace + LTR for obliques contraction 10x  · March with pelvic tilt x 15  · Bridging + iso hip abd BTB 1 x 20  · Pelvic tilt with Tband pull down (GTB) 2 x 10   · Chin tucks 10 x 5 sec with towel under head + alt arm flex with 1#  --NP  · Gentle cervical isometrics flex and extension 5 x 5s--NP  · Gentle cervical rotation isometrics 5 x 5s--NP  · Piriformis stretch 3 x 20s--NP  · Pelvic tilt with hip abd/ER with  BTB 20x -NP  · Pelvic tilt with Hip add isometric 20x -NP      Patient education:  Patient educated to continue with previously issued HEP to tolerance.  Gee verbalized good understanding of education provided.   No spiritual or educational barriers to learning provided    Assessment   Patient talib Tx fairly well.  TX remained more focused on neck symptoms as this area is more problematic currently. Some relief reported with mechanical traction and manual techniques.   Continues to report Improvements in neck pain reported after mechanical traction. Unfortunately, relief is temporary and he remains limited by general body aches and pain which are exacerbated by his physically demanding job.  Will likely benefit from our [employer] program upon completion of PT which was discussed with primary therapist and patient  Is open to it. He was provided with a copy of our [employer] brochure. Pt would continue to benefit from postural and core recruitment and endurance activities at this time.    This is a 52 y.o. male referred to outpatient physical therapy and presentsith a medical diagnosis of   Encounter Diagnoses   Name Primary?    Neck pain Yes    Chronic bilateral low back pain without sciatica    and demonstrates limitations as  described in the problem list.   Pt will continue to benefit from skilled outpatient physical therapy to address the deficits listed in the problem list, provide pt/family education and to maximize pt's level of independence in the home and community environment.     GOALS: Short Term Goals:    - Pt will have pain-free cervical extension in order to show improved mobility. Increased by 25%, in progress 6/4/18  - Pt will be able to demonstrate proper lifting from floor to waist of a 10-15# object in order to prevent injury with occupational demands. In progress 6/4/18  - Pt will be consistent and independent with HEP in order to promote carryover between therapy sessions. Met 6/4/18  - Pt will be able to demonstrate proper sitting posture without therapist cuing in order to decrease pain and improve positional tolerance. Met 6/4/18     Long Term Goals:   - Pt will be able to stand >5 hours with back and neck pain at less than a 2/10 in order to promote improved tolerance to work demands.  - Pt will increase periscapular strength by 1 ms grade in order to improve performance of functional activities to increase tolerance for ADL and work activities.  - Pt will increase gluteus gaby and medius strength by 1 ms grade in order to improve performance of functional activities to increase tolerance for ADL and work activities.  -  Pt to be Independent with updated HEP to maintain therapy gains following DC.  -  Pt will report improved management of chronic pain with at least a 25% reduction in back and neck pain since start of therapy. Met 6/4/18        Plan     Continue with established Plan of Care towards PT goals for an addition 2x/week for 4 weeks.    Therapist: Julien Grove, PTA  7/2/2018

## 2018-07-05 ENCOUNTER — CLINICAL SUPPORT (OUTPATIENT)
Dept: REHABILITATION | Facility: HOSPITAL | Age: 53
End: 2018-07-05
Attending: INTERNAL MEDICINE
Payer: COMMERCIAL

## 2018-07-05 DIAGNOSIS — M54.40 ACUTE BILATERAL LOW BACK PAIN WITH SCIATICA, SCIATICA LATERALITY UNSPECIFIED: ICD-10-CM

## 2018-07-05 DIAGNOSIS — M54.2 NECK PAIN: ICD-10-CM

## 2018-07-05 PROCEDURE — 97140 MANUAL THERAPY 1/> REGIONS: CPT | Mod: PO

## 2018-07-05 PROCEDURE — 97110 THERAPEUTIC EXERCISES: CPT | Mod: PO

## 2018-07-05 PROCEDURE — 97012 MECHANICAL TRACTION THERAPY: CPT | Mod: PO

## 2018-07-05 NOTE — PROGRESS NOTES
Physical Therapy Daily Note     Name: Gee Montez  Clinic Number: [id number]  Diagnosis:   Encounter Diagnoses   Name Primary?    Neck pain     Acute bilateral low back pain with sciatica, sciatica laterality unspecified      Physician: Omar Arredondo MD  Precautions: standard   Evaluation Date: 4/25/18  Visit # authorized: 19/20  Authorization period: 12/31/18  Plan of care Expiration: 7/18/18  Time In: 1:15p  Time Out:210p  Treatment time:55 minutes    Subjective     Pt reports he woke up with a HA this morning. He states he took aleve and it got better. He did not have work this morning, so his pain is not too bad. When he has work, his pain is always bad.  Pain Scale: Gee rates pain on a scale of 0-10 to be 5 currently to neck and lower back..    Objective     Reassess:    Cervical Range of Motion:     Degrees Pain   Flexion 60 Slight pain      Extension 65 Pain starts at 60 deg      Right Rotation 100% No pain      Left Rotation 100% No pain      Right Side Bending 35 no pain   Left Side Bending 35 No pain         Shoulder Range of Motion:   Shoulder Left Right   Flexion WNL WNL   Abduction WNL WNL   ER WNL WNL   IR WNL WNL         - Trunk Flexion: 100% no pain  - Trunk Ext: 75%, increased pain at B PSIS  - Trunk Rot R: 100%  - Trunk Rot L: 100%     Good B hip flexion PROM, limited with ER        Upper extremity strength: 5/5 generally B     Lower Trap 4-/5 Lower Trap 4-/5   Middle Trap 4-/5 Middle Trap 4-/5      Lower extremity ROM WNLs B  Lower extremity strength 5/5 except glutes 4/5 (glut med/max)        Special Tests cervical:  Distraction Improved pain   Compression + for pain in base of neck, no radicular symptoms             Gee received individual therapeutic exercises to develop cervical/postural/lumbarstrength, endurance, ROM, flexibility, posture and core stabilization for 40 minutes including: reassess    Session initiated with PATEL don  6 minutes total ( 3 minutes forward/backward) level 2    Standing ex's  · Corner Pec stretch 2 x 30  sec  · Deep neck flexion with ball on the wall 2 x 10  · Rows on cable cross with 10# 2 x 10  · Shoulder ext on cable cross with 7#  2 x 10  · Brueggers with GTB 2 x 10 --NP  · Cable cross push/pull with 10#2 x 10 each   · Chops downs on cable cross 2 x 10 10# B in half knee on blue foam pad  · Lower trap lift off against wall x 10-NP  · Middle trap lift off against wall x 10-NP  · Paloff core press on cable cross  10# 2 x 10     Machines (NP)  · SHuttle leg press with 4 bands + Iso hip abd BTB 2 x 10  · Shuttle kick backs with 1 black band 2 x 10      Gee received the following manual therapy techniques: Soft tissue Mobilization were applied to the: cervical paraspinals for 10 minutes including:    STM cervical paraspinals/UT  upglides and downglides c4-7_NP    Patient received mechanical cervical traction  for pain control with a  static pull @ 18 lbs, with a 15 degree angle of pull for 1o minutes.      Patient education:  Patient educated to continue with previously issued HEP to tolerance.  Gee verbalized good understanding of education provided.   No spiritual or educational barriers to learning provided    Assessment     Pt has met maximum rehab potential at this time. He has met 2/4 STGs and 4/5 LTGs at this time. He continues to have pain due to increased occupational demands requiring heavy lifting, climbing ladders, and increased neck and back extension ROM. Pt unable to perform light duty due to nature of his work. 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 Referral placed for medical fitness program for patient to maintain gains made with therapy. He is discharged at this time.    GOALS: Short Term Goals:    - Pt will have pain-free cervical extension in order to show improved mobility. In progress 7/5/18  - Pt will be able to demonstrate proper lifting  from floor to waist of a 10-15# object in order to prevent injury with occupational demands. In progress 7/5/18  - Pt will be consistent and independent with HEP in order to promote carryover between therapy sessions. Met 6/4/18  - Pt will be able to demonstrate proper sitting posture without therapist cuing in order to decrease pain and improve positional tolerance. Met 6/4/18     Long Term Goals:   - Pt will be able to stand >5 hours with back and neck pain at less than a 2/10 in order to promote improved tolerance to work demands. Met 7/5/18  - Pt will increase periscapular strength by 1 ms grade in order to improve performance of functional activities to increase tolerance for ADL and work activities. Met 7/5/18  - Pt will increase gluteus gaby and medius strength by 1 ms grade in order to improve performance of functional activities to increase tolerance for ADL and work activities. In progress 7/5/18  -  Pt to be Independent with updated HEP to maintain therapy gains following DC. Met 7/5/18  -  Pt will report improved management of chronic pain with at least a 25% reduction in back and neck pain since start of therapy. Met 6/4/18        Plan     Discharge to medical fitness program to maintain therapy goals    Therapist: Ashley Holstein, PT  7/5/2018

## 2018-08-08 DIAGNOSIS — M54.50 CHRONIC LOW BACK PAIN WITHOUT SCIATICA, UNSPECIFIED BACK PAIN LATERALITY: ICD-10-CM

## 2018-08-08 DIAGNOSIS — G89.29 CHRONIC NECK PAIN: ICD-10-CM

## 2018-08-08 DIAGNOSIS — M54.2 CHRONIC NECK PAIN: ICD-10-CM

## 2018-08-08 DIAGNOSIS — G89.29 CHRONIC LOW BACK PAIN WITHOUT SCIATICA, UNSPECIFIED BACK PAIN LATERALITY: ICD-10-CM

## 2018-08-09 RX ORDER — TRAMADOL HYDROCHLORIDE 50 MG/1
50 TABLET ORAL EVERY 8 HOURS PRN
Qty: 90 TABLET | Refills: 2 | Status: SHIPPED | OUTPATIENT
Start: 2018-08-09 | End: 2018-12-13 | Stop reason: SDUPTHER

## 2018-08-10 ENCOUNTER — TELEPHONE (OUTPATIENT)
Dept: INTERNAL MEDICINE | Facility: CLINIC | Age: 53
End: 2018-08-10

## 2018-08-13 ENCOUNTER — OFFICE VISIT (OUTPATIENT)
Dept: INTERNAL MEDICINE | Facility: CLINIC | Age: 53
End: 2018-08-13
Payer: COMMERCIAL

## 2018-08-13 VITALS
WEIGHT: 222.69 LBS | DIASTOLIC BLOOD PRESSURE: 80 MMHG | BODY MASS INDEX: 37.05 KG/M2 | SYSTOLIC BLOOD PRESSURE: 130 MMHG | HEART RATE: 80 BPM

## 2018-08-13 DIAGNOSIS — Z00.00 ROUTINE GENERAL MEDICAL EXAMINATION AT A HEALTH CARE FACILITY: Primary | ICD-10-CM

## 2018-08-13 DIAGNOSIS — M54.2 CHRONIC NECK PAIN: ICD-10-CM

## 2018-08-13 DIAGNOSIS — M54.50 CHRONIC LOW BACK PAIN WITHOUT SCIATICA, UNSPECIFIED BACK PAIN LATERALITY: ICD-10-CM

## 2018-08-13 DIAGNOSIS — E78.5 HYPERLIPIDEMIA, UNSPECIFIED HYPERLIPIDEMIA TYPE: ICD-10-CM

## 2018-08-13 DIAGNOSIS — I49.3 FREQUENT PVCS: ICD-10-CM

## 2018-08-13 DIAGNOSIS — G89.29 CHRONIC NECK PAIN: ICD-10-CM

## 2018-08-13 DIAGNOSIS — G89.29 CHRONIC LOW BACK PAIN WITHOUT SCIATICA, UNSPECIFIED BACK PAIN LATERALITY: ICD-10-CM

## 2018-08-13 LAB
AMORPH CRY UR QL COMP ASSIST: NORMAL
AMPHET+METHAMPHET UR QL: NEGATIVE
BARBITURATES UR QL SCN>200 NG/ML: NEGATIVE
BENZODIAZ UR QL SCN>200 NG/ML: NEGATIVE
BILIRUB UR QL STRIP: NEGATIVE
BZE UR QL SCN: NEGATIVE
CANNABINOIDS UR QL SCN: NEGATIVE
CLARITY UR REFRACT.AUTO: ABNORMAL
COLOR UR AUTO: YELLOW
CREAT UR-MCNC: 107 MG/DL
ETHANOL UR-MCNC: <10 MG/DL
GLUCOSE UR QL STRIP: NEGATIVE
HGB UR QL STRIP: NEGATIVE
KETONES UR QL STRIP: NEGATIVE
LEUKOCYTE ESTERASE UR QL STRIP: NEGATIVE
METHADONE UR QL SCN>300 NG/ML: NEGATIVE
MICROSCOPIC COMMENT: NORMAL
NITRITE UR QL STRIP: NEGATIVE
OPIATES UR QL SCN: NEGATIVE
PCP UR QL SCN>25 NG/ML: NEGATIVE
PH UR STRIP: 7 [PH] (ref 5–8)
PROT UR QL STRIP: NEGATIVE
SP GR UR STRIP: 1.02 (ref 1–1.03)
TOXICOLOGY INFORMATION: NORMAL
URN SPEC COLLECT METH UR: ABNORMAL
UROBILINOGEN UR STRIP-ACNC: NEGATIVE EU/DL

## 2018-08-13 PROCEDURE — 80307 DRUG TEST PRSMV CHEM ANLYZR: CPT

## 2018-08-13 PROCEDURE — 99396 PREV VISIT EST AGE 40-64: CPT | Mod: S$GLB,,, | Performed by: INTERNAL MEDICINE

## 2018-08-13 PROCEDURE — 99999 PR PBB SHADOW E&M-EST. PATIENT-LVL III: CPT | Mod: PBBFAC,,, | Performed by: INTERNAL MEDICINE

## 2018-08-13 PROCEDURE — 81001 URINALYSIS AUTO W/SCOPE: CPT

## 2018-08-13 RX ORDER — NEBIVOLOL HYDROCHLORIDE 5 MG/1
5 TABLET ORAL NIGHTLY
Qty: 90 TABLET | Refills: 11 | Status: SHIPPED | OUTPATIENT
Start: 2018-08-13 | End: 2019-11-05

## 2018-08-13 RX ORDER — ATORVASTATIN CALCIUM 10 MG/1
10 TABLET, FILM COATED ORAL DAILY
Qty: 90 TABLET | Refills: 11 | Status: SHIPPED | OUTPATIENT
Start: 2018-08-13 | End: 2019-11-05

## 2018-08-13 NOTE — PATIENT INSTRUCTIONS
Exercises for Shoulder Flexibility: Internal Rotation    This stretch can help restore shoulder flexibility and relieve pain over time. When stretching, be sure to breathe deeply. And follow any special instructions from your doctor or physical therapist.  · While seated, move the arm on the side you want to stretch toward the middle of your back. The palm of your hand should face out.  · Cup your other hand under the hand thats behind your back. Gently push your cupped hand upward until you feel the stretch in the shoulder. Try to hold the stretch for 5 seconds.  · Work up to doing 3 sets of this stretch, 3 times a day. Work up to holding the stretch for 30-60 seconds.  Note: Keep your back straight. Its okay if your hand cant reach the middle of your back. Instead, start the stretch with your hand as close as you can get it to the middle of your back.     Frozen Shoulder  Frozen shoulder is another name for adhesive capsulitis, which causes restricted movement in the shoulder. If you have frozen shoulder, this stretch may cause discomfort, especially when you first get started. A few months may pass before you achieve the results you want. But once your shoulder heals, it almost never becomes frozen again. So stick to your stretching program. If you have any questions, be sure to ask your doctor.   © 3565-1617 [employer]. [address] [place] [address], [place] [address]. All rights reserved. This information is not intended as a substitute for professional medical care. Always follow your healthcare professional's instructions.        Exercises for Shoulder Flexibility: Wall Walk  Improving your flexibility can reduce pain. Stretching exercises also can help increase your range of pain-free motion. Breathe normally when you exercise. And try to use smooth, fluid movements.  Note: Follow any special instructions you are given. If you feel pain, stop the exercise. If the pain continues after stopping,  call your healthcare provider.  · Stand with your shoulder about 2 feet from the wall.  · Raise your arm to shoulder level and gently walk your fingers up the wall as high as you can.  · Hold for a few seconds. Then walk your fingers back down.  · Repeat 3 times. Move closer to the wall as you repeat.  · Build up to holding each stretch for 30 seconds.  CAUTION: Do this stretch only if your healthcare provider recommends it. Dont do it when you are first injured.          © 8999-6445 [employer]. [address] [place] [address]. All rights reserved. This information is not intended as a substitute for professional medical care. Always follow your healthcare professional's instructions.        Exercises for Shoulder Flexibility: Pendulum Exercise   Improving your flexibility can reduce pain. Stretching exercises also can help increase your range of pain-free motion. Breathe normally when you exercise. And try to use smooth, fluid movements.  Follow any special instructions you are given. If you feel pain, stop the exercise. If the pain continues after stopping, call your health care provider.  · Lean over with your good arm supported on a table or chair.  · Relax the arm on the painful side, letting it hang straight down.  · Slowly begin to swing the relaxed arm. Move it in a small [place], gradually making it bigger if you can. Then reverse the direction. Next, move it backward and forward. Finally, move it side to side.     Note: Spend about 5 minutes doing the exercise, 3 times a day. Change direction after 1 minute of motion.   © 0488-7232 [employer]. [address] [place] [address]. All rights reserved. This information is not intended as a substitute for professional medical care. Always follow your healthcare professional's instructions.        Exercises for Shoulder Flexibility: Broom Stretch    Improving your flexibility can reduce pain. Stretching exercises  also can help increase your range of pain-free motion. Breathe normally when you exercise. Try to use smooth, fluid movements. Never force a stretch.  · Stand up or lie on the floor. Place the palm of your hand over the end of a broomstick or cane. Grasp the stick farther down with the other hand, palm facing down.  · Push the end of the stick up on the side of your injured shoulder as high as is comfortable.  · Hold for a few seconds. Return to the starting position.  · Repeat 3-5 times. Build up to holding each stretch for 10-30  seconds.  Note: Follow any special instructions you are given. If you feel pain, stop the exercise. If the pain continues after stopping, call your healthcare provider.   © 0791-0491 [employer]. [address] [place] [address], [place] [address]. All rights reserved. This information is not intended as a substitute for professional medical care. Always follow your healthcare professional's instructions.

## 2018-08-13 NOTE — PROGRESS NOTES
Subjective:       Patient ID: Gee Montez is a 52 y.o. male.    Chief Complaint: No chief complaint on file.    Here for annual exam.    Completed PT for a few mos.    Will be attending [facility] for free month and will see .    Feels hemorrhoids, psyllium husk helps as well. No actual blood in stools. Had colo 49 yrs old told 10yr f/u.    Urine flow not great. Wakes up a couple times at noc. Does feel complete urine evac and no actual dysuria.    Needs meds refilled.      Review of Systems   Constitutional: Negative for appetite change and unexpected weight change.   Respiratory: Negative for chest tightness and shortness of breath.    Cardiovascular: Negative for chest pain.   Gastrointestinal: Negative for abdominal distention and abdominal pain.   Genitourinary: Negative for difficulty urinating, scrotal swelling and testicular pain.   Musculoskeletal: Positive for back pain and neck pain. Negative for arthralgias and neck stiffness.   Skin:        No lesions       Objective:      Physical Exam   Constitutional: He is oriented to person, place, and time. He appears well-developed and well-nourished. No distress.   HENT:   Head: Normocephalic and atraumatic.   Eyes: No scleral icterus.   Neck: Normal range of motion. No thyromegaly present.   Cardiovascular: Normal rate, regular rhythm and normal heart sounds. Exam reveals no gallop and no friction rub.   No murmur heard.  Pulmonary/Chest: Effort normal and breath sounds normal. No respiratory distress. He has no wheezes. He has no rales.   Abdominal: Soft. Bowel sounds are normal. He exhibits no distension and no mass. There is no tenderness. There is no rebound and no guarding.   No CVAT   Musculoskeletal: Normal range of motion. He exhibits no edema.   nl lower extrem strength/sense/DTRs    nl upper extrem strength/sense/DTRs    R shoulder normal rom and no rotator cuff weakness   Lymphadenopathy:     He has no cervical adenopathy.   Neurological: He  is alert and oriented to person, place, and time.   Skin: No lesion noted.   Psychiatric: He has a normal mood and affect. Thought content normal.       Assessment:       1. Routine general medical examination at a health care facility    2. Hyperlipidemia, unspecified hyperlipidemia type    3. Frequent PVCs    4. Chronic neck pain    5. Chronic low back pain without sciatica, unspecified back pain laterality        Plan:       Diagnoses and all orders for this visit:    Routine general medical examination at a health care facility  -     Urinalysis  -     Lipid panel; Future  -     Comprehensive metabolic panel; Future  -     PSA, Screening; Future    Hyperlipidemia, unspecified hyperlipidemia type  -     atorvastatin (LIPITOR) 10 MG tablet; Take 1 tablet (10 mg total) by mouth once daily.    Frequent PVCs  -     BYSTOLIC 5 mg Tab; Take 1 tablet (5 mg total) by mouth every evening.    Chronic neck pain  -     Pain Clinic Drug Screen  -     Toxicology screen, urine  Chronic low back pain without sciatica, unspecified back pain laterality  -     Pain Clinic Drug Screen  -     Toxicology screen, urine  denies drug use    Chronic R shoulder pain -- Pt given handouts with HEP    Health Maintenance       Date Due Completion Date    Influenza Vaccine 08/01/2018 1/23/2018    Lipid Panel 07/07/2022 7/7/2017    Colonoscopy 02/19/2025 2/19/2015 (Done)    Override on 2/19/2015: Done    TETANUS VACCINE 07/07/2027 7/7/2017    Override on 6/9/2009: Done          Follow-up in about 6 months (around 2/13/2019).

## 2018-08-17 ENCOUNTER — LAB VISIT (OUTPATIENT)
Dept: LAB | Facility: HOSPITAL | Age: 53
End: 2018-08-17
Attending: INTERNAL MEDICINE
Payer: COMMERCIAL

## 2018-08-17 DIAGNOSIS — Z00.00 ROUTINE GENERAL MEDICAL EXAMINATION AT A HEALTH CARE FACILITY: ICD-10-CM

## 2018-08-17 LAB
6MAM UR QL: NOT DETECTED
7AMINOCLONAZEPAM UR QL: NOT DETECTED
A-OH ALPRAZ UR QL: NOT DETECTED
ALBUMIN SERPL BCP-MCNC: 4 G/DL
ALP SERPL-CCNC: 42 U/L
ALPRAZ UR QL: NOT DETECTED
ALT SERPL W/O P-5'-P-CCNC: 35 U/L
AMPHET UR QL SCN: NOT DETECTED
ANION GAP SERPL CALC-SCNC: 7 MMOL/L
ANNOTATION COMMENT IMP: NORMAL
ANNOTATION COMMENT IMP: NORMAL
AST SERPL-CCNC: 26 U/L
BARBITURATES UR QL: NOT DETECTED
BILIRUB SERPL-MCNC: 0.6 MG/DL
BUN SERPL-MCNC: 16 MG/DL
BUPRENORPHINE UR QL: NOT DETECTED
BZE UR QL: NOT DETECTED
CALCIUM SERPL-MCNC: 9.4 MG/DL
CARBOXYTHC UR QL: NOT DETECTED
CARISOPRODOL UR QL: NOT DETECTED
CHLORIDE SERPL-SCNC: 105 MMOL/L
CHOLEST SERPL-MCNC: 152 MG/DL
CHOLEST/HDLC SERPL: 2.8 {RATIO}
CLONAZEPAM UR QL: NOT DETECTED
CO2 SERPL-SCNC: 28 MMOL/L
CODEINE UR QL: NOT DETECTED
COMPLEXED PSA SERPL-MCNC: 0.55 NG/ML
CREAT SERPL-MCNC: 0.9 MG/DL
CREAT UR-MCNC: 109.5 MG/DL (ref 20–400)
DIAZEPAM UR QL: NOT DETECTED
EST. GFR  (AFRICAN AMERICAN): >60 ML/MIN/1.73 M^2
EST. GFR  (NON AFRICAN AMERICAN): >60 ML/MIN/1.73 M^2
ETHYL GLUCURONIDE UR QL: NOT DETECTED
FENTANYL UR QL: NOT DETECTED
GLUCOSE SERPL-MCNC: 105 MG/DL
HDLC SERPL-MCNC: 54 MG/DL
HDLC SERPL: 35.5 %
HYDROCODONE UR QL: NOT DETECTED
HYDROMORPHONE UR QL: NOT DETECTED
LDLC SERPL CALC-MCNC: 80.8 MG/DL
LORAZEPAM UR QL: NOT DETECTED
MDA UR QL: NOT DETECTED
MDEA UR QL: NOT DETECTED
MDMA UR QL: NOT DETECTED
ME-PHENIDATE UR QL: NOT DETECTED
MEPERIDINE UR QL: NOT DETECTED
METHADONE UR QL: NOT DETECTED
METHAMPHET UR QL: NOT DETECTED
MIDAZOLAM UR QL SCN: NOT DETECTED
MORPHINE UR QL: NOT DETECTED
NONHDLC SERPL-MCNC: 98 MG/DL
NORBUPRENORPHINE UR QL CFM: NOT DETECTED
NORDIAZEPAM UR QL: NOT DETECTED
NORFENTANYL UR QL: NOT DETECTED
NORHYDROCODONE UR QL CFM: NOT DETECTED
NOROXYCODONE UR QL CFM: NOT DETECTED
NOROXYMORPHONE: NOT DETECTED
OXAZEPAM UR QL: NOT DETECTED
OXYCODONE UR QL: NOT DETECTED
OXYMORPHONE UR QL: NOT DETECTED
PATHOLOGY STUDY: NORMAL
PCP UR QL: NOT DETECTED
PHENTERMINE UR QL: NOT DETECTED
POTASSIUM SERPL-SCNC: 4.1 MMOL/L
PROPOXYPH UR QL: NOT DETECTED
PROT SERPL-MCNC: 6.9 G/DL
SERVICE CMNT-IMP: NORMAL
SODIUM SERPL-SCNC: 140 MMOL/L
TAPENTADOL UR QL SCN: NOT DETECTED
TAPENTADOL-O-SULF: NOT DETECTED
TEMAZEPAM UR QL: NOT DETECTED
TRAMADOL UR QL: PRESENT
TRIGL SERPL-MCNC: 86 MG/DL
ZOLPIDEM UR QL: NOT DETECTED

## 2018-08-17 PROCEDURE — 80061 LIPID PANEL: CPT

## 2018-08-17 PROCEDURE — 80053 COMPREHEN METABOLIC PANEL: CPT

## 2018-08-17 PROCEDURE — 36415 COLL VENOUS BLD VENIPUNCTURE: CPT

## 2018-08-17 PROCEDURE — 84153 ASSAY OF PSA TOTAL: CPT

## 2018-12-13 DIAGNOSIS — G89.29 CHRONIC NECK PAIN: ICD-10-CM

## 2018-12-13 DIAGNOSIS — G89.29 CHRONIC LOW BACK PAIN WITHOUT SCIATICA, UNSPECIFIED BACK PAIN LATERALITY: ICD-10-CM

## 2018-12-13 DIAGNOSIS — M54.50 CHRONIC LOW BACK PAIN WITHOUT SCIATICA, UNSPECIFIED BACK PAIN LATERALITY: ICD-10-CM

## 2018-12-13 DIAGNOSIS — M54.2 CHRONIC NECK PAIN: ICD-10-CM

## 2018-12-14 RX ORDER — TRAMADOL HYDROCHLORIDE 50 MG/1
50 TABLET ORAL EVERY 8 HOURS PRN
Qty: 90 TABLET | Refills: 2 | Status: SHIPPED | OUTPATIENT
Start: 2018-12-14 | End: 2019-02-15

## 2019-01-04 ENCOUNTER — TELEPHONE (OUTPATIENT)
Dept: INTERNAL MEDICINE | Facility: CLINIC | Age: 54
End: 2019-01-04

## 2019-01-04 NOTE — TELEPHONE ENCOUNTER
----- Message from Tiffany Byrd sent at 1/3/2019  2:04 PM CST -----  Contact: [phone]  Calling to ask to have labs and urine done for the physical, wrong code, needs to be corrected

## 2019-01-04 NOTE — TELEPHONE ENCOUNTER
Spoke with patient who stated that in August when he got his annual the labs and urine that you put in was coded incorrectly and his insurance [employer] is not paying for it, so they sent him the bill. It was suppose to be apart of his annual visit.

## 2019-01-04 NOTE — TELEPHONE ENCOUNTER
"Spoke with patient, who is still stating that the insurance company told him the blood work was coded wrong and he said that they told him "all the doctor has to do is put the blood work in with the annual visit and we will pay for it". I advised to him to call the central pricing office several times and he is unsure what are they suppose to help with. Stated to patient I do not work in billing and coding but you can call the number as advised and if nothing changes please give us a call back. Patient stated the blood work for him is about $20 something dollars and $9 for the urine that he has to pay out of pocket if this does not get fixed. He stated no medication or conditions have changed so he is not sure why he is having this problem.  "

## 2019-01-07 NOTE — TELEPHONE ENCOUNTER
"Hi, please call him back and let him know that I sent a [facility] message to him.  Thank you, Omar Arredondo    Here's the message --  "  Hi Mr. Montez, I am sorry about the unexpected bill that you have received. I did order the blood work under the code for annual visit, CPT code 48104, "Established Patient Preventive Medicine Services". I will discuss this as well with our billing dept.   I am not sure what else I could have done on my end.   Let me know if you have any more questions.   Omar Arredondo   "  "

## 2019-01-09 ENCOUNTER — PATIENT MESSAGE (OUTPATIENT)
Dept: INTERNAL MEDICINE | Facility: CLINIC | Age: 54
End: 2019-01-09

## 2019-02-15 ENCOUNTER — OFFICE VISIT (OUTPATIENT)
Dept: INTERNAL MEDICINE | Facility: CLINIC | Age: 54
End: 2019-02-15
Payer: COMMERCIAL

## 2019-02-15 VITALS
SYSTOLIC BLOOD PRESSURE: 128 MMHG | HEART RATE: 77 BPM | BODY MASS INDEX: 32.63 KG/M2 | HEIGHT: 70 IN | OXYGEN SATURATION: 95 % | DIASTOLIC BLOOD PRESSURE: 80 MMHG | WEIGHT: 227.94 LBS

## 2019-02-15 DIAGNOSIS — E78.5 HYPERLIPIDEMIA, UNSPECIFIED HYPERLIPIDEMIA TYPE: ICD-10-CM

## 2019-02-15 PROCEDURE — 99999 PR PBB SHADOW E&M-EST. PATIENT-LVL III: CPT | Mod: PBBFAC,,, | Performed by: INTERNAL MEDICINE

## 2019-02-15 PROCEDURE — 99213 PR OFFICE/OUTPT VISIT, EST, LEVL III, 20-29 MIN: ICD-10-PCS | Mod: S$GLB,,, | Performed by: INTERNAL MEDICINE

## 2019-02-15 PROCEDURE — 99999 PR PBB SHADOW E&M-EST. PATIENT-LVL III: ICD-10-PCS | Mod: PBBFAC,,, | Performed by: INTERNAL MEDICINE

## 2019-02-15 PROCEDURE — 99213 OFFICE O/P EST LOW 20 MIN: CPT | Mod: S$GLB,,, | Performed by: INTERNAL MEDICINE

## 2019-02-15 RX ORDER — ATORVASTATIN CALCIUM 10 MG/1
1 TABLET, FILM COATED ORAL
COMMUNITY
End: 2019-06-11

## 2019-02-15 RX ORDER — NEBIVOLOL 5 MG/1
1 TABLET ORAL
COMMUNITY
End: 2019-06-11

## 2019-02-15 NOTE — PATIENT INSTRUCTIONS
Please try to get a copy of the last colonoscopy sent to us, they can fax it to me at --  [phone]      High Fiber Diet  Fiber is present in all fruits, vegetables, cereals and grains. Fiber passes through the body undigested. A high fiber diet helps food move through the intestinal tract. The added bulk is helpful in preventing constipation. In people with diverticulosis it serves to clean out the pouches along the colon wall while preventing new ones from forming. A high fiber diet also reduces the risk of colon cancer, decreases blood cholesterol and prevents high blood sugar in people with diabetes.    The foods listed below are high in fiber and should be included in your diet. If you are not used to high fiber foods, start with 1 or 2 foods from this list. Every 3-4 days add a new one to your diet until you are eating 4 high fiber foods per day. This should give you 20-35 Gm of fiber/day. It is also important to drink a lot of water when you are on this diet (6-8 glasses a day). Water causes the fiber to swell and increases the benefit.  Foods High In Dietary Fiber:  BREADS: Made with 100% whole wheat flour; sethela, wheat or rye crackers; tortillas, bran muffins  CEREALS: Whole grain cereal with bran (Chex, Raisin Bran, Corn Bran), oatmeal, rolled oats, granola, wheat flakes, brown rice  NUTS: Any nuts  FRUITS: All fresh fruits along with edible skins, (bananas, citrus fruit, mangoes, pears, prunes, raisins, apples, pineapple, apricot, melon, jams and marmalades), fruit juices (especially prune juice)  VEGETABLES: All types, preferably raw or lightly cooked: especially, celery, eggplant, potatoes,spinach, broccoli, brussel sprouts, winter squash, carrots, cauliflower, soybeans, lentils, fresh and dried beans of all kinds  OTHER: Popcorn, any spices  © 0923-7768 [employer]. [address] [place] [address], [place] [address]. All rights reserved. This information is not intended as a substitute for  professional medical care. Always follow your healthcare professional's instructions.

## 2019-02-15 NOTE — PROGRESS NOTES
Subjective:       Patient ID: Gee Montez is a 53 y.o. male.    Chief Complaint: Follow-up (6 month)    Here for f/u -- Patient is here for followup for chronic conditions.    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    6 weeks ago had bad sinus infection and ear pressure, symptoms are now improved.    Has done chiropractor and PT on his neck and back. Chiropractor care has really helped and now off Tramadol.    Still has anal bleeding and reports a hemorrhoid that comes and goes. No constipation since off pain med and urine flow normal now as well.      Review of Systems   Constitutional: Negative for appetite change and unexpected weight change.   Respiratory: Negative for chest tightness and shortness of breath.    Cardiovascular: Negative for chest pain.   Gastrointestinal: Positive for anal bleeding. Negative for abdominal distention, abdominal pain, blood in stool, nausea and vomiting.   Genitourinary: Negative for difficulty urinating, scrotal swelling and testicular pain.   Musculoskeletal: Negative for arthralgias, back pain, neck pain and neck stiffness.   Skin:        No lesions       Objective:      Physical Exam   Constitutional: He is oriented to person, place, and time. He appears well-developed and well-nourished. No distress.   HENT:   Head: Normocephalic and atraumatic.   Eyes: No scleral icterus.   Neck: Normal range of motion. No thyromegaly present.   Cardiovascular: Normal rate, regular rhythm and normal heart sounds. Exam reveals no gallop and no friction rub.   No murmur heard.  Pulmonary/Chest: Effort normal and breath sounds normal. No respiratory distress. He has no wheezes. He has no rales.   Abdominal: Soft. Bowel sounds are normal. He exhibits no distension and no mass. There is no tenderness. There is no rebound and no guarding.   No CVAT   Genitourinary:   Genitourinary Comments: No ext hemorrhoid seen today   Musculoskeletal: Normal range of motion. He exhibits no edema.   Now neck rom is normal and  not painful.  nl upper extrem strength/sense/DTRs  nl lower extrem strength/sense/DTRs     Lymphadenopathy:     He has no cervical adenopathy.   Neurological: He is alert and oriented to person, place, and time.   Skin: No lesion noted.   Psychiatric: He has a normal mood and affect. Thought content normal.       Assessment:       1. Hyperlipidemia, unspecified hyperlipidemia type        Plan:       Gee was seen today for follow-up.    Diagnoses and all orders for this visit:    Hyperlipidemia, unspecified hyperlipidemia type  Takes statin qod.      Neck and back are improved, he will try to remain off Tramadol. Partially improved since no longer strenuous job.    Hemorrhoid, not ext on exam today, possibly int.  Discussed increased diet fiber.  Try to get colo report from 4 yrs ago.        Health Maintenance       Date Due Completion Date    Lipid Panel 08/17/2023 8/17/2018    Colonoscopy 02/19/2025 2/19/2015 (Done)    Override on 2/19/2015: Done    TETANUS VACCINE 07/07/2027 7/7/2017    Override on 6/9/2009: Done          Follow-up in about 1 year (around 2/15/2020).    No future appointments.

## 2019-06-11 ENCOUNTER — OFFICE VISIT (OUTPATIENT)
Dept: URGENT CARE | Facility: CLINIC | Age: 54
End: 2019-06-11
Payer: COMMERCIAL

## 2019-06-11 VITALS
HEIGHT: 70 IN | HEART RATE: 65 BPM | OXYGEN SATURATION: 96 % | WEIGHT: 225 LBS | TEMPERATURE: 98 F | RESPIRATION RATE: 18 BRPM | DIASTOLIC BLOOD PRESSURE: 79 MMHG | SYSTOLIC BLOOD PRESSURE: 121 MMHG | BODY MASS INDEX: 32.21 KG/M2

## 2019-06-11 DIAGNOSIS — H81.10 BENIGN PAROXYSMAL POSITIONAL VERTIGO, UNSPECIFIED LATERALITY: Primary | ICD-10-CM

## 2019-06-11 LAB
GLUCOSE SERPL-MCNC: 92 MG/DL (ref 70–110)
POC ANION GAP: 18 MMOL/L (ref 10–20)
POC BUN: 20 MMOL/L (ref 8–26)
POC CHLORIDE: 102 MMOL/L (ref 98–109)
POC CREATININE: 0.9 MG/DL (ref 0.6–1.3)
POC HEMATOCRIT: 48 %PCV (ref 42–52)
POC HEMOGLOBIN: 16.3 G/DL (ref 13.5–18)
POC ICA: 1.11 MMOL/L (ref 1.12–1.32)
POC POTASSIUM: 4.5 MMOL/L (ref 3.5–4.9)
POC SODIUM: 140 MMOL/L (ref 138–146)
POC TCO2: 25 MMOL/L (ref 24–29)

## 2019-06-11 PROCEDURE — 99214 OFFICE O/P EST MOD 30 MIN: CPT | Mod: S$GLB,,, | Performed by: FAMILY MEDICINE

## 2019-06-11 PROCEDURE — 3008F PR BODY MASS INDEX (BMI) DOCUMENTED: ICD-10-PCS | Mod: CPTII,S$GLB,, | Performed by: FAMILY MEDICINE

## 2019-06-11 PROCEDURE — 80047 BASIC METABLC PNL IONIZED CA: CPT | Mod: QW,S$GLB,, | Performed by: FAMILY MEDICINE

## 2019-06-11 PROCEDURE — 99214 PR OFFICE/OUTPT VISIT, EST, LEVL IV, 30-39 MIN: ICD-10-PCS | Mod: S$GLB,,, | Performed by: FAMILY MEDICINE

## 2019-06-11 PROCEDURE — 3008F BODY MASS INDEX DOCD: CPT | Mod: CPTII,S$GLB,, | Performed by: FAMILY MEDICINE

## 2019-06-11 PROCEDURE — 80047 POCT CHEMISTRY PANEL: ICD-10-PCS | Mod: QW,S$GLB,, | Performed by: FAMILY MEDICINE

## 2019-06-11 RX ORDER — MECLIZINE HCL 12.5 MG 12.5 MG/1
12.5 TABLET ORAL 3 TIMES DAILY PRN
Qty: 30 TABLET | Refills: 0 | Status: SHIPPED | OUTPATIENT
Start: 2019-06-11 | End: 2020-05-21

## 2019-06-11 NOTE — PROGRESS NOTES
"Subjective:       Patient ID: Gee Montez is a 53 y.o. male.    Vitals:  height is 5' 10" (1.778 m) and weight is 102.1 kg (225 lb). His temperature is 98 °F (36.7 °C). His blood pressure is 121/79 and his pulse is 65. His respiration is 18 and oxygen saturation is 96%.     Chief Complaint: Cough    Patient presents with the dizziness when he moves his head a certain way.  He was concerned for dehydration and has been trying to drink more fluids.  No nausea, vomiting or diarrhea.  Not dizzy with sitting to standing.  No headache.  NO CHANGE IN HEARING.  NO HEADACHES.    Cough   This is a new problem. The current episode started in the past 7 days. The problem has been unchanged. The cough is non-productive. Pertinent negatives include no chest pain, chills, fever, headaches, myalgias, rash, sore throat or shortness of breath. The symptoms are aggravated by stress. He has tried nothing for the symptoms. The treatment provided no relief.       Constitution: Negative for chills, fatigue and fever.   HENT: Negative for congestion and sore throat.    Neck: Negative for painful lymph nodes.   Cardiovascular: Negative for chest pain and leg swelling.   Eyes: Negative for double vision and blurred vision.   Respiratory: Negative for cough and shortness of breath.    Gastrointestinal: Negative for nausea, vomiting and diarrhea.   Genitourinary: Negative for dysuria, frequency and urgency.   Musculoskeletal: Negative for joint pain, joint swelling, muscle cramps and muscle ache.   Skin: Negative for color change, pale and rash.   Allergic/Immunologic: Negative for seasonal allergies.   Neurological: Positive for dizziness. Negative for history of vertigo, light-headedness, passing out and headaches.   Hematologic/Lymphatic: Negative for swollen lymph nodes, easy bruising/bleeding and history of blood clots. Does not bruise/bleed easily.   Psychiatric/Behavioral: Positive for nervous/anxious. Negative for sleep disturbance " and depression. The patient is nervous/anxious.        Objective:      Physical Exam   Constitutional: He is oriented to person, place, and time. He appears well-developed and well-nourished. He is cooperative.  Non-toxic appearance. He does not appear ill. No distress.   HENT:   Head: Normocephalic and atraumatic.   Right Ear: Hearing, tympanic membrane, external ear and ear canal normal.   Left Ear: Hearing, tympanic membrane and ear canal normal.   Nose: Nose normal. No mucosal edema, rhinorrhea or nasal deformity. No epistaxis. Right sinus exhibits no maxillary sinus tenderness and no frontal sinus tenderness. Left sinus exhibits no maxillary sinus tenderness and no frontal sinus tenderness.   Mouth/Throat: Uvula is midline, oropharynx is clear and moist and mucous membranes are normal. No trismus in the jaw. Normal dentition. No uvula swelling. No oropharyngeal exudate or posterior oropharyngeal erythema.   Left ear canal with small blood at 12:00, consistent with  Q-tip instrumentation.  Normal TM.   Eyes: Pupils are equal, round, and reactive to light. Conjunctivae, EOM and lids are normal. Right eye exhibits no discharge. Left eye exhibits no discharge. No scleral icterus.   Sclera clear bilat.  No nystagmus   Neck: Trachea normal, normal range of motion, full passive range of motion without pain and phonation normal. Neck supple.   Cardiovascular: Normal rate, regular rhythm, normal heart sounds, intact distal pulses and normal pulses.   No murmur heard.  Pulmonary/Chest: Effort normal and breath sounds normal. No stridor. No respiratory distress. He has no wheezes. He has no rales.   Abdominal: Soft. Normal appearance and bowel sounds are normal. He exhibits no distension, no pulsatile midline mass and no mass. There is no tenderness. There is no rebound and no guarding.   Musculoskeletal: Normal range of motion. He exhibits no edema or deformity.   Lymphadenopathy:     He has no cervical adenopathy.    Neurological: He is alert and oriented to person, place, and time. He displays normal reflexes. No cranial nerve deficit or sensory deficit. He exhibits normal muscle tone. Coordination normal.   Negative Romberg   Skin: Skin is warm, dry and intact. He is not diaphoretic. No pallor.   Psychiatric: He has a normal mood and affect. His speech is normal and behavior is normal. Judgment and thought content normal. Cognition and memory are normal.   Nursing note and vitals reviewed.      Results for orders placed or performed in visit on 06/11/19   POCT Chemistry Panel   Result Value Ref Range    POC Sodium 140 138 - 146 MMOL/L    POC Potassium 4.5 3.5 - 4.9 MMOL/L    POC Chloride 102 98 - 109 MMOL/L    POC BUN 20 8 - 26 MMOL/L    POC Glucose 92 70 - 110 MG/DL    POC Creatinine 0.9 0.6 - 1.3 mg/dL    POC iCA 1.11 (A) 1.12 - 1.32 MMOL/L    POC TCO2 25 24 - 29 MMOL/L    POC Hematocrit 48 42 - 52 %PCV    POC Hemoglobin 16.3 13.5 - 18 g/dL    POC Anion Gap 18 10.0 - 20 MMOL/L     Assessment:       1. Benign paroxysmal positional vertigo, unspecified laterality        Plan:         Benign paroxysmal positional vertigo, unspecified laterality  -     POCT Chemistry Panel    CONTINUE TO DRINK GENEROUS FLUIDS.  RECHECK WITH YOUR PRIMARY CARE PROVIDER IF THIS IS NOT IMPROVING AS EXPECTED.    Make sure that you follow up with your primary care doctor in the next 2-5 days if needed .  Return to the Urgent Care if signs or symptoms change and certainly if you have worsening symptoms go to the nearest emergency department for further evaluation.

## 2019-06-12 NOTE — PATIENT INSTRUCTIONS
Inner Ear Problems: Causes of Dizziness (Vertigo)       Benign positional vertigo (BPV)  This is the most common cause of vertigo. BPV is also called benign positional paroxysmal vertigo (BPPV). It happens when crystals in the ear canals shift into the wrong place. Vertigo usually occurs when you move your head in a certain way. This can happen when turning in bed, bending, or looking up. Because BPV comes on quickly, you should think about if you are safe to drive or do other tasks that need your full attention.  BPV:  · Causes vertigo that last for seconds. Vertigo can occur several times a day, depending on body position.  · Doesnt cause hearing loss  · Often goes away on its own. But it but may go away sooner with treatment.  Infection or inflammation  Sometimes the semicircular canals swell and send incorrect balance signals. This problem may be caused by a viral infection. Depending on the cause, your hearing can be affected (labyrinthitis). Or your hearing can remain normal (neuronitis).  Infection or inflammation:  · Causes vertigo that lasts for hours or days. The first episode is usually the worst.  · Can cause hearing loss  · Often goes away on its own. But it may go away sooner with treatment.  You may need vestibular rehabilitation if you have balance problems that don't go away.  Menieres disease  This condition is uncommon. It happens when there is too much fluid in the ear canals. This causes increased pressure and swelling. It affects balance and hearing signals.  Menieres disease may:  · Cause vertigo that last for hours  · Cause hearing problems that come and go. The problems are usually in one ear and get worse over time.  · Cause buzzing or ringing in the ears (tinnitus)  · Cause a feeling of fullness or pressure in the ear  · Cause any of these symptoms: vertigo, hearing loss, tinnitus, or ear fullness to last a lifetime  Date Last Reviewed: 11/1/2016  © 7369-6774 [employer],  [employer] [address] [place] [address]. All rights reserved. This information is not intended as a substitute for professional medical care. Always follow your healthcare professional's instructions.      CONTINUE TO DRINK GENEROUS FLUIDS.  RECHECK WITH YOUR PRIMARY CARE PROVIDER IF THIS IS NOT IMPROVING AS EXPECTED.    Make sure that you follow up with your primary care doctor in the next 2-5 days if needed .  Return to the Urgent Care if signs or symptoms change and certainly if you have worsening symptoms go to the nearest emergency department for further evaluation.

## 2019-07-24 ENCOUNTER — OFFICE VISIT (OUTPATIENT)
Dept: UROLOGY | Facility: CLINIC | Age: 54
End: 2019-07-24
Payer: COMMERCIAL

## 2019-07-24 ENCOUNTER — HOSPITAL ENCOUNTER (OUTPATIENT)
Dept: RADIOLOGY | Facility: HOSPITAL | Age: 54
Discharge: HOME | End: 2019-07-24
Attending: NURSE PRACTITIONER
Payer: COMMERCIAL

## 2019-07-24 VITALS
SYSTOLIC BLOOD PRESSURE: 131 MMHG | WEIGHT: 234.38 LBS | HEART RATE: 61 BPM | HEIGHT: 70 IN | DIASTOLIC BLOOD PRESSURE: 79 MMHG | BODY MASS INDEX: 33.55 KG/M2

## 2019-07-24 DIAGNOSIS — N20.0 KIDNEY STONES: ICD-10-CM

## 2019-07-24 DIAGNOSIS — R35.1 NOCTURIA: ICD-10-CM

## 2019-07-24 DIAGNOSIS — N13.8 BPH WITH URINARY OBSTRUCTION: Primary | ICD-10-CM

## 2019-07-24 DIAGNOSIS — N40.1 BPH WITH URINARY OBSTRUCTION: Primary | ICD-10-CM

## 2019-07-24 DIAGNOSIS — R86.9 SEMEN ABNORMAL: ICD-10-CM

## 2019-07-24 DIAGNOSIS — N40.1 BPH WITH URINARY OBSTRUCTION: ICD-10-CM

## 2019-07-24 DIAGNOSIS — N13.8 BPH WITH URINARY OBSTRUCTION: ICD-10-CM

## 2019-07-24 LAB
BILIRUB SERPL-MCNC: NORMAL MG/DL
BLOOD URINE, POC: NORMAL
COLOR, POC UA: YELLOW
GLUCOSE UR QL STRIP: NORMAL
KETONES UR QL STRIP: NORMAL
LEUKOCYTE ESTERASE URINE, POC: NORMAL
NITRITE, POC UA: NORMAL
PH, POC UA: 7
PROTEIN, POC: NORMAL
SPECIFIC GRAVITY, POC UA: 1.01
UROBILINOGEN, POC UA: 0

## 2019-07-24 PROCEDURE — 81002 URINALYSIS NONAUTO W/O SCOPE: CPT | Mod: S$GLB,,, | Performed by: NURSE PRACTITIONER

## 2019-07-24 PROCEDURE — 99203 OFFICE O/P NEW LOW 30 MIN: CPT | Mod: 25,S$GLB,, | Performed by: NURSE PRACTITIONER

## 2019-07-24 PROCEDURE — 74018 XR ABDOMEN AP 1 VIEW: ICD-10-PCS | Mod: 26,,, | Performed by: RADIOLOGY

## 2019-07-24 PROCEDURE — 74018 RADEX ABDOMEN 1 VIEW: CPT | Mod: TC

## 2019-07-24 PROCEDURE — 99999 PR PBB SHADOW E&M-EST. PATIENT-LVL III: CPT | Mod: PBBFAC,,, | Performed by: NURSE PRACTITIONER

## 2019-07-24 PROCEDURE — 99999 PR PBB SHADOW E&M-EST. PATIENT-LVL III: ICD-10-PCS | Mod: PBBFAC,,, | Performed by: NURSE PRACTITIONER

## 2019-07-24 PROCEDURE — 74018 RADEX ABDOMEN 1 VIEW: CPT | Mod: 26,,, | Performed by: RADIOLOGY

## 2019-07-24 PROCEDURE — 99203 PR OFFICE/OUTPT VISIT, NEW, LEVL III, 30-44 MIN: ICD-10-PCS | Mod: 25,S$GLB,, | Performed by: NURSE PRACTITIONER

## 2019-07-24 PROCEDURE — 81002 POCT URINE DIPSTICK WITHOUT MICROSCOPE: ICD-10-PCS | Mod: S$GLB,,, | Performed by: NURSE PRACTITIONER

## 2019-07-24 PROCEDURE — 3008F PR BODY MASS INDEX (BMI) DOCUMENTED: ICD-10-PCS | Mod: CPTII,S$GLB,, | Performed by: NURSE PRACTITIONER

## 2019-07-24 PROCEDURE — 3008F BODY MASS INDEX DOCD: CPT | Mod: CPTII,S$GLB,, | Performed by: NURSE PRACTITIONER
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
## 2019-10-31 NOTE — PROGRESS NOTES
CRS Office Visit History and Physical    Referring Md:   Omar Arredondo Md  [address] [place] [address]    SUBJECTIVE:     Chief Complaint: hemorrhoids    History of Present Illness:  The patient is new patient to this practice.   Course is as follows:  Patient is a 54 y.o. male presents with hemorrhoids.  He has had intermittent bright red blood per rectum over the past several weeks.  He has 2-3 bowel movements per day.  He takes fiber supplementation regularly.  He spends 15-20 minutes on the toilet for each bowel movement frequently on his phone.  Family history significant for paternal grandfather with colon cancer.  No past anorectal surgeries.  Does have intermittent prolapse of tissue with each bowel movement that requires manual reduction.  He frequently applies topical preparation H cream for relief.    Last Colonoscopy:  02/19/2015:  Normal colonoscopy.  Follow up in 10 years.  Medium internal hemorrhoids noted.    Review of patient's allergies indicates:   Allergen Reactions    Adhes. band-tape-benzalkonium     Adhesive tape-silicones     Aspirin      Hurts stomach    Benadryl itch relief Nausea Only and Palpitations       Past Medical History:   Diagnosis Date    Chronic neck pain     Frequent PVCs     Hyperlipidemia      History reviewed. No pertinent surgical history.  Family History   Problem Relation Age of Onset    Ulcers Mother     Heart disease Mother     Diabetes Mother     Ulcers Father     Stroke Father     Stroke Paternal Grandmother     Cancer Paternal Grandfather 64        CRC     Social History     Tobacco Use    Smoking status: Former Smoker   Substance Use Topics    Alcohol use: Yes     Comment: 2-3 drinks on weekend days, 5 drinks per week    Drug use: Not on file        Review of Systems:  Review of Systems   Constitutional: Negative for chills, diaphoresis, fever, malaise/fatigue and weight loss.   HENT: Negative for congestion.    Respiratory: Negative  "for shortness of breath.    Cardiovascular: Negative for chest pain and leg swelling.   Gastrointestinal: Positive for blood in stool. Negative for abdominal pain, constipation, nausea and vomiting.   Genitourinary: Negative for dysuria.   Musculoskeletal: Negative for back pain and myalgias.   Skin: Negative for rash.   Neurological: Negative for dizziness and weakness.   Endo/Heme/Allergies: Does not bruise/bleed easily.   Psychiatric/Behavioral: Negative for depression.       OBJECTIVE:     Vital Signs (Most Recent)  /85 (BP Location: Left arm, Patient Position: Sitting, BP Method: Large (Automatic))   Pulse 71   Ht 5' 10" (1.778 m)   Wt 107.2 kg (236 lb 5.3 oz)   BMI 33.91 kg/m²     Physical Exam:  General: White male in no distress   Neuro: alert and oriented x 4.  Moves all extremities.     HEENT: no icterus.  Trachea midline  Respiratory: respirations are even and unlabored  Cardiac: regular rate  Abdomen:  Soft, nontender, no masses  Extremities: Warm dry and intact  Skin: no rashes  Anorectal:  External exam is normal. Digital exam performed.  Boggy internal hemorrhoids palpated. Normal tone.  Anoscopy was then performed. Grade 2 hemorrhoids seen on the left lateral side. Grade 1 hemorrhoid in the right posterior side.    Labs: no recent labs    Imaging: none      ASSESSMENT/PLAN:     Gee was seen today for hemorrhoids.    Diagnoses and all orders for this visit:    Grade II hemorrhoids        54-year-old gentleman with grade 2 hemorrhoids.  Banding was offered today and performed without complication.  Additionally, behavioral modification was encouraged with spending less than 5 min on the toilet without a phone for each bowel movement.  I will see him back in 4 weeks.  He will likely need repeat banding on the left side.    Rubber Band Ligation:  Verbal consent was obtained.   Clear plastic anoscope inserted.    Grade II hemorrhoids seen on the left lateral position with grade 1 hemorrhoids " seen in the right posterior position  Suction applicator was placed above the dentate line.   Rubber bands were deployed in the left laterals and right posterior position.    Patient tolerated the procedure well.       KVNG Arteaga MD, FACS  Staff Surgeon  Colon & Rectal Surgery

## 2019-11-01 ENCOUNTER — OFFICE VISIT (OUTPATIENT)
Dept: SURGERY | Facility: CLINIC | Age: 54
End: 2019-11-01
Payer: COMMERCIAL

## 2019-11-01 VITALS
BODY MASS INDEX: 33.83 KG/M2 | HEART RATE: 71 BPM | WEIGHT: 236.31 LBS | DIASTOLIC BLOOD PRESSURE: 85 MMHG | HEIGHT: 70 IN | SYSTOLIC BLOOD PRESSURE: 136 MMHG

## 2019-11-01 DIAGNOSIS — K64.1 GRADE II HEMORRHOIDS: Primary | ICD-10-CM

## 2019-11-01 PROCEDURE — 99999 PR PBB SHADOW E&M-EST. PATIENT-LVL III: CPT | Mod: PBBFAC,,, | Performed by: COLON & RECTAL SURGERY

## 2019-11-01 PROCEDURE — 99203 OFFICE O/P NEW LOW 30 MIN: CPT | Mod: 25,S$GLB,, | Performed by: COLON & RECTAL SURGERY

## 2019-11-01 PROCEDURE — 3008F PR BODY MASS INDEX (BMI) DOCUMENTED: ICD-10-PCS | Mod: CPTII,S$GLB,, | Performed by: COLON & RECTAL SURGERY

## 2019-11-01 PROCEDURE — 46221 LIGATION OF HEMORRHOID(S): CPT | Mod: S$GLB,,, | Performed by: COLON & RECTAL SURGERY

## 2019-11-01 PROCEDURE — 99203 PR OFFICE/OUTPT VISIT, NEW, LEVL III, 30-44 MIN: ICD-10-PCS | Mod: 25,S$GLB,, | Performed by: COLON & RECTAL SURGERY

## 2019-11-01 PROCEDURE — 3008F BODY MASS INDEX DOCD: CPT | Mod: CPTII,S$GLB,, | Performed by: COLON & RECTAL SURGERY

## 2019-11-01 PROCEDURE — 99999 PR PBB SHADOW E&M-EST. PATIENT-LVL III: ICD-10-PCS | Mod: PBBFAC,,, | Performed by: COLON & RECTAL SURGERY

## 2019-11-01 PROCEDURE — 46221 PR HEMORRHOIDECTOMY INTERNAL RUBBER BAND LIGATIONS: ICD-10-PCS | Mod: S$GLB,,, | Performed by: COLON & RECTAL SURGERY

## 2019-11-01 NOTE — LETTER
November 1, 2019      Omar Arredondo MD  [address]  [facility] [address]           [facility]  [address]  [facility] [address]  Phone: [phone]          Patient: Gee Montez   MR Number: [id number]   YOB: 1965   Date of Visit: 11/1/2019       Dear Dr. Omar Arredondo:    Thank you for referring Gee Montez to me for evaluation. Attached you will find relevant portions of my assessment and plan of care.    If you have questions, please do not hesitate to call me. I look forward to following Gee Montez along with you.    Sincerely,    Korey Arteaga MD    Enclosure  CC:  No Recipients    If you would like to receive this communication electronically, please contact [email or web address] or [phone] to request more information on [employer] access.    For providers and/or their staff who would like to refer a patient to [facility], please contact us through our one-stop-shop provider referral line, [facility] [place], at [phone].    If you feel you have received this communication in error or would no longer like to receive these types of communications, please e-mail [email or web address]

## 2019-11-05 DIAGNOSIS — E78.5 HYPERLIPIDEMIA, UNSPECIFIED HYPERLIPIDEMIA TYPE: ICD-10-CM

## 2019-11-05 DIAGNOSIS — I49.3 FREQUENT PVCS: ICD-10-CM

## 2019-11-05 RX ORDER — NEBIVOLOL HYDROCHLORIDE 5 MG/1
5 TABLET ORAL NIGHTLY
Qty: 90 TABLET | Refills: 11 | Status: SHIPPED | OUTPATIENT
Start: 2019-11-05 | End: 2020-11-17

## 2019-11-05 RX ORDER — ATORVASTATIN CALCIUM 10 MG/1
10 TABLET, FILM COATED ORAL DAILY
Qty: 90 TABLET | Refills: 11 | Status: SHIPPED | OUTPATIENT
Start: 2019-11-05 | End: 2020-11-17

## 2019-12-05 NOTE — PROGRESS NOTES
CRS Office Visit Follow Up    Referring Md:   Omar Arredondo Md  [address] [place] [address]    SUBJECTIVE:     Chief Complaint: hemorrhoids    History of Present Illness:  The patient is an established patient to this practice.   Course is as follows:  Patient is a 54 y.o. male presents with hemorrhoids.  He has had intermittent bright red blood per rectum over the past several weeks.  He has 2-3 bowel movements per day.  He takes fiber supplementation regularly.  He spends 15-20 minutes on the toilet for each bowel movement frequently on his phone.  Family history significant for paternal grandfather with colon cancer.  No past anorectal surgeries.  Does have intermittent prolapse of tissue with each bowel movement that requires manual reduction.  He frequently applies topical preparation H cream for relief.   - RBL done 11/1/19 to Grade II hemorrhoids seen on the left lateral position with grade 1 hemorrhoids seen in the right posterior position    Last Colonoscopy:  02/19/2015:  Normal colonoscopy.  Follow up in 10 years.  Medium internal hemorrhoids noted.    Current status:   12/6/19:  Complains of an iron smell with dark melanotic appearing stools.  Intermittent bright red blood per rectum.  No change in his bowel movements.  No change in his weight.    Review of Systems:  Review of Systems   Constitutional: Negative for chills, diaphoresis, fever, malaise/fatigue and weight loss.   HENT: Negative for congestion.    Respiratory: Negative for shortness of breath.    Cardiovascular: Negative for chest pain and leg swelling.   Gastrointestinal: Positive for blood in stool. Negative for abdominal pain, constipation, nausea and vomiting.   Genitourinary: Negative for dysuria.   Musculoskeletal: Negative for back pain and myalgias.   Skin: Negative for rash.   Neurological: Negative for dizziness and weakness.   Endo/Heme/Allergies: Does not bruise/bleed easily.   Psychiatric/Behavioral: Negative for  "depression.       OBJECTIVE:     Vital Signs (Most Recent)  BP (!) 149/85 (BP Location: Right arm, Patient Position: Sitting, BP Method: Large (Automatic))   Pulse 68   Ht 5' 10" (1.778 m)   Wt 107.7 kg (237 lb 7 oz)   BMI 34.07 kg/m²     Physical Exam:  General: White male in no distress   Neuro: alert and oriented x 4.  Moves all extremities.     HEENT: no icterus.  Trachea midline  Respiratory: respirations are even and unlabored  Cardiac: regular rate  Abdomen:  Soft, nontender, no masses  Extremities: Warm dry and intact  Skin: no rashes  Anorectal:  Deferred today    Labs: no recent labs    Imaging: none      ASSESSMENT/PLAN:     Gee was seen today for follow-up.    Diagnoses and all orders for this visit:    Rectal bleeding  -     Case request GI: COLONOSCOPY        54-year-old gentleman with grade 2 hemorrhoids.  Banding was performed previously.  He continues to have a melanotic appearance.  Therefore, diagnostic colonoscopy was recommended.  If the colonoscopy is normal, rubber-band ligation can be performed following his colonoscopy.      KVNG Arteaga MD, FACS  Staff Surgeon  Colon & Rectal Surgery    "

## 2019-12-06 ENCOUNTER — OFFICE VISIT (OUTPATIENT)
Dept: SURGERY | Facility: CLINIC | Age: 54
End: 2019-12-06
Payer: COMMERCIAL

## 2019-12-06 VITALS
HEIGHT: 70 IN | SYSTOLIC BLOOD PRESSURE: 149 MMHG | DIASTOLIC BLOOD PRESSURE: 85 MMHG | WEIGHT: 237.44 LBS | HEART RATE: 68 BPM | BODY MASS INDEX: 33.99 KG/M2

## 2019-12-06 DIAGNOSIS — K62.5 RECTAL BLEEDING: Primary | ICD-10-CM

## 2019-12-06 PROCEDURE — 99999 PR PBB SHADOW E&M-EST. PATIENT-LVL III: CPT | Mod: PBBFAC,,, | Performed by: COLON & RECTAL SURGERY

## 2019-12-06 PROCEDURE — 3008F PR BODY MASS INDEX (BMI) DOCUMENTED: ICD-10-PCS | Mod: CPTII,S$GLB,, | Performed by: COLON & RECTAL SURGERY

## 2019-12-06 PROCEDURE — 99212 OFFICE O/P EST SF 10 MIN: CPT | Mod: S$GLB,,, | Performed by: COLON & RECTAL SURGERY

## 2019-12-06 PROCEDURE — 99999 PR PBB SHADOW E&M-EST. PATIENT-LVL III: ICD-10-PCS | Mod: PBBFAC,,, | Performed by: COLON & RECTAL SURGERY

## 2019-12-06 PROCEDURE — 99212 PR OFFICE/OUTPT VISIT, EST, LEVL II, 10-19 MIN: ICD-10-PCS | Mod: S$GLB,,, | Performed by: COLON & RECTAL SURGERY

## 2019-12-06 PROCEDURE — 3008F BODY MASS INDEX DOCD: CPT | Mod: CPTII,S$GLB,, | Performed by: COLON & RECTAL SURGERY

## 2019-12-06 NOTE — LETTER
December 6, 2019      Omar Arredondo MD  [address]  [facility] [address]           [facility]  [address]  [facility] [address]  Phone: [phone]          Patient: Gee Montez   MR Number: [id number]   YOB: 1965   Date of Visit: 12/6/2019       Dear Dr. Omar Arredondo:    Thank you for referring Gee Montez to me for evaluation. Attached you will find relevant portions of my assessment and plan of care.    If you have questions, please do not hesitate to call me. I look forward to following Gee Montez along with you.    Sincerely,    Korey Arteaga MD    Enclosure  CC:  No Recipients    If you would like to receive this communication electronically, please contact [email or web address] or [phone] to request more information on [employer] Link access.    For providers and/or their staff who would like to refer a patient to [facility], please contact us through our one-stop-shop provider referral line, [facility], at [phone].    If you feel you have received this communication in error or would no longer like to receive these types of communications, please e-mail [email or web address]

## 2019-12-17 ENCOUNTER — OFFICE VISIT (OUTPATIENT)
Dept: OPTOMETRY | Facility: CLINIC | Age: 54
End: 2019-12-17
Payer: COMMERCIAL

## 2019-12-17 ENCOUNTER — TELEPHONE (OUTPATIENT)
Dept: OPTOMETRY | Facility: CLINIC | Age: 54
End: 2019-12-17

## 2019-12-17 DIAGNOSIS — H00.025 HORDEOLUM INTERNUM OF LEFT LOWER EYELID: Primary | ICD-10-CM

## 2019-12-17 PROCEDURE — 99999 PR PBB SHADOW E&M-EST. PATIENT-LVL III: ICD-10-PCS | Mod: PBBFAC,,, | Performed by: OPTOMETRIST

## 2019-12-17 PROCEDURE — 99999 PR PBB SHADOW E&M-EST. PATIENT-LVL III: CPT | Mod: PBBFAC,,, | Performed by: OPTOMETRIST

## 2019-12-17 PROCEDURE — 92002 INTRM OPH EXAM NEW PATIENT: CPT | Mod: S$GLB,,, | Performed by: OPTOMETRIST

## 2019-12-17 PROCEDURE — 92002 PR EYE EXAM, NEW PATIENT,INTERMED: ICD-10-PCS | Mod: S$GLB,,, | Performed by: OPTOMETRIST

## 2019-12-17 RX ORDER — DOXYCYCLINE 100 MG/1
100 CAPSULE ORAL 2 TIMES DAILY
Qty: 20 CAPSULE | Refills: 0 | Status: SHIPPED | OUTPATIENT
Start: 2019-12-17 | End: 2019-12-27

## 2019-12-17 RX ORDER — NEOMYCIN SULFATE, POLYMYXIN B SULFATE AND DEXAMETHASONE 3.5; 10000; 1 MG/ML; [USP'U]/ML; MG/ML
1 SUSPENSION/ DROPS OPHTHALMIC 4 TIMES DAILY
Qty: 5 ML | Refills: 0 | Status: SHIPPED | OUTPATIENT
Start: 2019-12-17 | End: 2019-12-17

## 2019-12-17 RX ORDER — NEOMYCIN SULFATE, POLYMYXIN B SULFATE AND DEXAMETHASONE 3.5; 10000; 1 MG/ML; [USP'U]/ML; MG/ML
1 SUSPENSION/ DROPS OPHTHALMIC 4 TIMES DAILY
Qty: 5 ML | Refills: 0 | Status: SHIPPED | OUTPATIENT
Start: 2019-12-17 | End: 2019-12-27

## 2019-12-17 RX ORDER — DOXYCYCLINE HYCLATE 100 MG
100 TABLET ORAL 2 TIMES DAILY
Qty: 20 TABLET | Refills: 0 | Status: SHIPPED | OUTPATIENT
Start: 2019-12-17 | End: 2019-12-17

## 2019-12-17 NOTE — PROGRESS NOTES
HPI     Pt is coming in for eye problem. 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 (History)            Assessment /Plan     For exam results, see Encounter Report.    Hordeolum internum of left lower eyelid  -     neomycin-polymyxin-dexamethasone (MAXITROL) 3.5mg/mL-10,000 unit/mL-0.1 % DrpS; Place 1 drop into the left eye 4 (four) times daily. for 10 days  Dispense: 5 mL; Refill: 0  -     doxycycline (VIBRA-TABS) 100 MG tablet; Take 1 tablet (100 mg total) by mouth 2 (two) times daily. for 10 days  Dispense: 20 tablet; Refill: 0      Rec increasing warm compresses QID OS. Rx Maxitrol QId OS x 10 days and Doxy 100mg BiD p.o. X 10 days.   Will call w/update in 1 week.

## 2019-12-17 NOTE — LETTER
December 17, 2019    Gee Montez  [address] [facility] [place] [address]      [facility]  [address] [facility]  [place] [address]  Phone: [phone]  Fax: [phone] Gee Montez    Was treated here on 12/17/2019    May Return to work/school on 12/17/19.   The patient's condition is not contagious.        Sincerely,    Citlalli Pires, OD

## 2019-12-17 NOTE — PATIENT INSTRUCTIONS
Sty (or Stye)  A sty is an infection of the oil gland of the eyelid. It may develop into a small pocket of pus (abscess). This can cause pain, redness, and swelling. In early stages, styes are treated with antibiotic cream, eye drops, or warm packs (small towels soaked in warm water). More severe cases may need to be opened and drained by a health care provider.  Home care  · Eye drops or ointment are usually prescribed to treat the infection. Use these as directed.   ¨ Artificial tears may also be used to lubricate the eye and make it more comfortable. These may be purchased without a prescription.   ¨ Talk to your health care provider before using any over-the-counter treatment for a sty.  · Apply a warm, damp towel to the affected eye for at least 5 minutes, 3 to 4 times a day for a week. Warm compresses open the pores and speed the healing. If the compresses are too hot, they may burn your eyelid.  · Sometimes the sty will drain with this treatment alone. If this happens, continue the antibiotic until all the redness and swelling are gone.  · Wash your hands before and after touching the infected eye to avoid spreading the infection.  · Do not squeeze or try to puncture the sty.  Follow-up care  Follow up with your health care provider, or as advised.   When to seek medical advice  Call your health care provider right away if you have:  · Increase in swelling or redness around the eyelid after 48 to 72 hours  · Increase in eye pain or the eyelid blisters  · Increase in warmth--the eyelid feels hot  · Drainage of blood or thick pus from the sty  · Blister on the eyelid  · Inability to open the eyelid due to swelling  · Fever  ¨ 1 degree above your normal temperature lasting for 24 to 48 hours, or  ¨ Whatever your health care provider told you to report based on your medical condition  · Vision changes  · Headache or stiff neck  · Recurrence of the sty  Date Last Reviewed: 6/14/2015  © 8635-3901 [employer]. [address] [place] [address]. All rights reserved. This information is not intended as a substitute for professional medical care. Always follow your healthcare professional's instructions.        Self-Care    To treat the problem, keep your eyelids clean.      To use an eyelid scrub:  1. Wash your hands with soap and warm water.  2. Use a ready-made eyelid scrub. Or mix 3 drops of baby shampoo in 1/4 cup of warm water.  3. Dip a lint-free pad, cotton swab, or clean washcloth in the scrub.  4. Close one eye and gently scrub the base of the eyelid.  5. Rinse the lid in cool water and dry with a clean towel.  6. Repeat on your other eye.  Date Last Reviewed: 6/6/2015  © 8079-7251 [employer]. [address] [place] [address]. All rights reserved. This information is not intended as a substitute for professional medical care. Always follow your healthcare professional's instructions.

## 2019-12-19 DIAGNOSIS — Z12.11 SPECIAL SCREENING FOR MALIGNANT NEOPLASMS, COLON: Primary | ICD-10-CM

## 2019-12-19 RX ORDER — SODIUM, POTASSIUM,MAG SULFATES 17.5-3.13G
SOLUTION, RECONSTITUTED, ORAL ORAL
Qty: 354 ML | Refills: 0 | Status: SHIPPED | OUTPATIENT
Start: 2019-12-19 | End: 2020-05-21

## 2019-12-24 ENCOUNTER — PATIENT MESSAGE (OUTPATIENT)
Dept: OPTOMETRY | Facility: CLINIC | Age: 54
End: 2019-12-24

## 2020-01-24 ENCOUNTER — OFFICE VISIT (OUTPATIENT)
Dept: OPTOMETRY | Facility: CLINIC | Age: 55
End: 2020-01-24
Payer: COMMERCIAL

## 2020-01-24 DIAGNOSIS — H52.202 MYOPIA WITH ASTIGMATISM AND PRESBYOPIA, LEFT: Primary | ICD-10-CM

## 2020-01-24 DIAGNOSIS — H52.02 HYPEROPIA OF LEFT EYE WITH ASTIGMATISM AND PRESBYOPIA: ICD-10-CM

## 2020-01-24 DIAGNOSIS — H52.12 MYOPIA WITH ASTIGMATISM AND PRESBYOPIA, LEFT: Primary | ICD-10-CM

## 2020-01-24 DIAGNOSIS — H10.522 ANGULAR BLEPHAROCONJUNCTIVITIS OF LEFT EYE: ICD-10-CM

## 2020-01-24 DIAGNOSIS — H52.202 HYPEROPIA OF LEFT EYE WITH ASTIGMATISM AND PRESBYOPIA: ICD-10-CM

## 2020-01-24 DIAGNOSIS — H52.4 HYPEROPIA OF LEFT EYE WITH ASTIGMATISM AND PRESBYOPIA: ICD-10-CM

## 2020-01-24 DIAGNOSIS — H52.4 MYOPIA WITH ASTIGMATISM AND PRESBYOPIA, LEFT: Primary | ICD-10-CM

## 2020-01-24 DIAGNOSIS — H25.13 NUCLEAR SCLEROSIS OF BOTH EYES: ICD-10-CM

## 2020-01-24 PROCEDURE — 92015 PR REFRACTION: ICD-10-PCS | Mod: S$GLB,,, | Performed by: OPTOMETRIST

## 2020-01-24 PROCEDURE — 99999 PR PBB SHADOW E&M-EST. PATIENT-LVL III: CPT | Mod: PBBFAC,,, | Performed by: OPTOMETRIST

## 2020-01-24 PROCEDURE — 92015 DETERMINE REFRACTIVE STATE: CPT | Mod: S$GLB,,, | Performed by: OPTOMETRIST

## 2020-01-24 PROCEDURE — 99999 PR PBB SHADOW E&M-EST. PATIENT-LVL III: ICD-10-PCS | Mod: PBBFAC,,, | Performed by: OPTOMETRIST

## 2020-01-24 PROCEDURE — 92014 PR EYE EXAM, EST PATIENT,COMPREHESV: ICD-10-PCS | Mod: S$GLB,,, | Performed by: OPTOMETRIST

## 2020-01-24 PROCEDURE — 92014 COMPRE OPH EXAM EST PT 1/>: CPT | Mod: S$GLB,,, | Performed by: OPTOMETRIST

## 2020-01-24 NOTE — PROGRESS NOTES
HPI     DEON:12/2018  Glasses? Yes   Contacts? no  H/o eye surgery, injections or laser:AK-OS , Cryo-OS   H/o eye injury: no  Known eye conditions? no  Family h/o eye conditions? MGM-cataracts , Mother-cataracts   Eye gtts?no    (-) Flashes (-) Floaters (+) Mucous  OS   (+) Tearing (-) Itching (-) Burning   (-) Headaches (-) Eye Pain/discomfort (-) Irritation   (+) Redness bloodshot OU  (-) Double vision (-) Blurry vision    Diabetic? (-)  A1c?  (No results found for: HGBA1C)        Last edited by Gege Cuevas on 1/24/2020  3:25 PM. (History)            Assessment /Plan     For exam results, see Encounter Report.    Angular blepharoconjunctivitis of left eye    Nuclear sclerosis of both eyes    Myopia with astigmatism and presbyopia, left    Hyperopia of left eye with astigmatism and presbyopia      1. Resume Maxitrol TID OS. Rec lid scrubs BID OU.   2. Nuclear sclerotic cataract - not visually significant. Observe.  3-4. SRx released to patient. Patient educated on lens options. Normal ocular health. RTC 1 year for routine exam.

## 2020-01-24 NOTE — PATIENT INSTRUCTIONS
Self-Care    To treat the problem, keep your eyelids clean.      To use an eyelid scrub:  1. Wash your hands with soap and warm water.  2. Use a ready-made eyelid scrub. Or mix 3 drops of baby shampoo in 1/4 cup of warm water.  3. Dip a lint-free pad, cotton swab, or clean washcloth in the scrub.  4. Close one eye and gently scrub the base of the eyelid.  5. Rinse the lid in cool water and dry with a clean towel.  6. Repeat on your other eye.  Date Last Reviewed: 6/6/2015  © 0666-1993 [employer]. [address] [place] [address], [place] [address]. All rights reserved. This information is not intended as a substitute for professional medical care. Always follow your healthcare professional's instructions.

## 2020-03-16 ENCOUNTER — PATIENT MESSAGE (OUTPATIENT)
Dept: ENDOSCOPY | Facility: HOSPITAL | Age: 55
End: 2020-03-16

## 2020-03-16 ENCOUNTER — TELEPHONE (OUTPATIENT)
Dept: ENDOSCOPY | Facility: HOSPITAL | Age: 55
End: 2020-03-16

## 2020-03-16 NOTE — TELEPHONE ENCOUNTER
Dr. Arteaga,     Patient inquiring if he still should be coming for his colonoscopy this Friday. Please advise.    Thanks,  Saray

## 2020-03-20 ENCOUNTER — HOSPITAL ENCOUNTER (OUTPATIENT)
Facility: HOSPITAL | Age: 55
Discharge: HOME | End: 2020-03-20
Attending: COLON & RECTAL SURGERY | Admitting: COLON & RECTAL SURGERY
Payer: COMMERCIAL

## 2020-03-20 ENCOUNTER — ANESTHESIA (OUTPATIENT)
Dept: ENDOSCOPY | Facility: HOSPITAL | Age: 55
End: 2020-03-20
Payer: COMMERCIAL

## 2020-03-20 ENCOUNTER — ANESTHESIA EVENT (OUTPATIENT)
Dept: ENDOSCOPY | Facility: HOSPITAL | Age: 55
End: 2020-03-20
Payer: COMMERCIAL

## 2020-03-20 VITALS
TEMPERATURE: 98 F | SYSTOLIC BLOOD PRESSURE: 130 MMHG | DIASTOLIC BLOOD PRESSURE: 76 MMHG | OXYGEN SATURATION: 96 % | RESPIRATION RATE: 18 BRPM | WEIGHT: 240 LBS | HEART RATE: 62 BPM | BODY MASS INDEX: 34.36 KG/M2 | HEIGHT: 70 IN

## 2020-03-20 DIAGNOSIS — K62.5 RECTAL BLEEDING: Primary | ICD-10-CM

## 2020-03-20 PROCEDURE — 25000003 PHARM REV CODE 250: Performed by: NURSE ANESTHETIST, CERTIFIED REGISTERED

## 2020-03-20 PROCEDURE — 63600175 PHARM REV CODE 636 W HCPCS: Performed by: ANESTHESIOLOGY

## 2020-03-20 PROCEDURE — E9220 PRA ENDO ANESTHESIA: HCPCS | Mod: ,,, | Performed by: NURSE ANESTHETIST, CERTIFIED REGISTERED

## 2020-03-20 PROCEDURE — 45398 COLONOSCOPY W/BAND LIGATION: CPT | Mod: ,,, | Performed by: COLON & RECTAL SURGERY

## 2020-03-20 PROCEDURE — 27201022: Performed by: COLON & RECTAL SURGERY

## 2020-03-20 PROCEDURE — 37000009 HC ANESTHESIA EA ADD 15 MINS: Performed by: COLON & RECTAL SURGERY

## 2020-03-20 PROCEDURE — 63600175 PHARM REV CODE 636 W HCPCS: Performed by: COLON & RECTAL SURGERY

## 2020-03-20 PROCEDURE — 37000008 HC ANESTHESIA 1ST 15 MINUTES: Performed by: COLON & RECTAL SURGERY

## 2020-03-20 PROCEDURE — 45398 COLONOSCOPY W/BAND LIGATION: CPT | Performed by: COLON & RECTAL SURGERY

## 2020-03-20 PROCEDURE — 25000003 PHARM REV CODE 250: Performed by: COLON & RECTAL SURGERY

## 2020-03-20 PROCEDURE — E9220 PRA ENDO ANESTHESIA: ICD-10-PCS | Mod: ,,, | Performed by: NURSE ANESTHETIST, CERTIFIED REGISTERED

## 2020-03-20 PROCEDURE — 63600175 PHARM REV CODE 636 W HCPCS: Performed by: NURSE ANESTHETIST, CERTIFIED REGISTERED

## 2020-03-20 PROCEDURE — 45398: ICD-10-PCS | Mod: ,,, | Performed by: COLON & RECTAL SURGERY

## 2020-03-20 RX ORDER — TRAMADOL HYDROCHLORIDE 50 MG/1
50 TABLET ORAL EVERY 6 HOURS PRN
Qty: 30 TABLET | Refills: 0 | Status: SHIPPED | OUTPATIENT
Start: 2020-03-20 | End: 2020-05-21

## 2020-03-20 RX ORDER — FENTANYL CITRATE 50 UG/ML
25 INJECTION, SOLUTION INTRAMUSCULAR; INTRAVENOUS EVERY 5 MIN PRN
Status: DISCONTINUED | OUTPATIENT
Start: 2020-03-20 | End: 2020-03-20 | Stop reason: HOSPADM

## 2020-03-20 RX ORDER — PROPOFOL 10 MG/ML
VIAL (ML) INTRAVENOUS CONTINUOUS PRN
Status: DISCONTINUED | OUTPATIENT
Start: 2020-03-20 | End: 2020-03-20

## 2020-03-20 RX ORDER — GLYCOPYRROLATE 0.2 MG/ML
INJECTION INTRAMUSCULAR; INTRAVENOUS
Status: DISCONTINUED | OUTPATIENT
Start: 2020-03-20 | End: 2020-03-20

## 2020-03-20 RX ORDER — SODIUM CHLORIDE 9 MG/ML
INJECTION, SOLUTION INTRAVENOUS CONTINUOUS
Status: DISCONTINUED | OUTPATIENT
Start: 2020-03-20 | End: 2020-03-20 | Stop reason: HOSPADM

## 2020-03-20 RX ORDER — PROPOFOL 10 MG/ML
VIAL (ML) INTRAVENOUS
Status: DISCONTINUED | OUTPATIENT
Start: 2020-03-20 | End: 2020-03-20

## 2020-03-20 RX ORDER — LIDOCAINE HYDROCHLORIDE 20 MG/ML
INJECTION INTRAVENOUS
Status: DISCONTINUED | OUTPATIENT
Start: 2020-03-20 | End: 2020-03-20

## 2020-03-20 RX ORDER — BUPIVACAINE HYDROCHLORIDE 2.5 MG/ML
INJECTION, SOLUTION EPIDURAL; INFILTRATION; INTRACAUDAL
Status: COMPLETED | OUTPATIENT
Start: 2020-03-20 | End: 2020-03-20

## 2020-03-20 RX ADMIN — PROPOFOL 150 MCG/KG/MIN: 10 INJECTION, EMULSION INTRAVENOUS at 07:03

## 2020-03-20 RX ADMIN — FENTANYL CITRATE 25 MCG: 50 INJECTION INTRAMUSCULAR; INTRAVENOUS at 08:03

## 2020-03-20 RX ADMIN — PROPOFOL 100 MG: 10 INJECTION, EMULSION INTRAVENOUS at 07:03

## 2020-03-20 RX ADMIN — LIDOCAINE HYDROCHLORIDE 50 MG: 20 INJECTION, SOLUTION INTRAVENOUS at 07:03

## 2020-03-20 RX ADMIN — BUPIVACAINE HYDROCHLORIDE 5 ML: 2.5 INJECTION, SOLUTION EPIDURAL; INFILTRATION; INTRACAUDAL; PERINEURAL at 07:03

## 2020-03-20 RX ADMIN — SODIUM CHLORIDE: 0.9 INJECTION, SOLUTION INTRAVENOUS at 07:03

## 2020-03-20 RX ADMIN — GLYCOPYRROLATE 0.2 MG: 0.2 INJECTION, SOLUTION INTRAMUSCULAR; INTRAVENOUS at 07:03

## 2020-03-20 NOTE — ANESTHESIA PREPROCEDURE EVALUATION
03/20/2020  Gee Montez is a 54 y.o., male.    Anesthesia Evaluation    I have reviewed the Patient Summary Reports.     I have reviewed the Medications.     Review of Systems  Anesthesia Hx:  No previous Anesthesia  History of prior surgery of interest to airway management or planning:   Social:  Former Smoker, Social Alcohol Use    Hematology/Oncology:  Hematology Normal   Oncology Normal     EENT/Dental:EENT/Dental Normal   Cardiovascular:  Cardiovascular Normal Exercise tolerance: good     Pulmonary:  Pulmonary Normal    Renal/:   Chronic Renal Disease    Hepatic/GI:  Hepatic/GI Normal    Musculoskeletal:  Musculoskeletal Normal    Neurological:  Neurology Normal    Endocrine:  Endocrine Normal    Dermatological:  Skin Normal    Psych:  Psychiatric Normal           Physical Exam  General:  Obesity    Airway/Jaw/Neck:  Airway Findings: Mouth Opening: Normal Tongue: Normal  General Airway Assessment: Adult  Mallampati: II  TM Distance: Normal, at least 6 cm  Jaw/Neck Findings:  Neck ROM: Normal ROM      Dental:  Dental Findings: In tact        Mental Status:  Mental Status Findings:  Cooperative, Alert and Oriented         Anesthesia Plan  Type of Anesthesia, risks & benefits discussed:  Anesthesia Type:  general  Patient's Preference: GA  Intra-op Monitoring Plan: standard ASA monitors  Intra-op Monitoring Plan Comments:   Post Op Pain Control Plan: multimodal analgesia  Post Op Pain Control Plan Comments:   Induction:   IV  Beta Blocker:  Patient is on a Beta-Blocker and has received one dose within the past 24 hours (No further documentation required).       Informed Consent: Patient understands risks and agrees with Anesthesia plan.  Questions answered. Anesthesia consent signed with patient.  ASA Score: 2     Day of Surgery Review of History & Physical:  There are no significant changes.  H&P  update referred to the surgeon.         Ready For Surgery From Anesthesia Perspective.

## 2020-03-20 NOTE — ANESTHESIA POSTPROCEDURE EVALUATION
Anesthesia Post Evaluation    Patient: Gee Montez    Procedure(s) Performed: Procedure(s) (LRB):  COLONOSCOPY (N/A)    [facility] Anesthesia Post Op Evaluation      Vitals Value Taken Time   /76 3/20/2020  8:47 AM   Temp 36.6 °C (97.9 °F) 3/20/2020  7:53 AM   Pulse 62 3/20/2020  8:47 AM   Resp 18 3/20/2020  8:47 AM   SpO2 96 % 3/20/2020  8:47 AM         Event Time     Out of Recovery 09:00:22          Pain/Tyree Score: Pain Rating Prior to Med Admin: 8 (3/20/2020  8:17 AM)  Tyree Score: 10 (3/20/2020  8:23 AM)

## 2020-03-20 NOTE — DISCHARGE INSTRUCTIONS
Colonoscopy     A camera attached to a flexible tube with a viewing lens is used to take video pictures.     Colonoscopy is a test to view the inside of your lower digestive tract (colon and rectum). Sometimes it can show the last part of the small intestine (ileum). During the test, small pieces of tissue may be removed for testing. This is called a biopsy. Small growths, such as polyps, may also be removed.   Why is colonoscopy done?  The test is done to help look for colon cancer. And it can help find the source of abdominal pain, bleeding, and changes in bowel habits. It may be needed once a year, depending on factors such as your:  · Age  · Health history  · Family health history  · Symptoms  · Results from any prior colonoscopy  Risks and possible complications  These include:  · Bleeding               · A puncture or tear in the colon   · Risks of anesthesia  · A cancer lesion not being seen  Getting ready   To prepare for the test:  · Talk with your healthcare provider about the risks of the test (see below). Also ask your healthcare provider about alternatives to the test.  · Tell your healthcare provider about any medicines you take. Also tell him or her about any health conditions you may have.  · Make sure your rectum and colon are empty for the test. Follow the diet and bowel prep instructions exactly. If you dont, the test may need to be rescheduled.  · Plan for a friend or family member to drive you home after the test.     Colonoscopy provides an inside view of the entire colon.     You may discuss the results with your doctor right away or at a future visit.  During the test   The test is usually done in the hospital on an outpatient basis. This means you go home the same day. The procedure takes about 30 minutes. During that time:  · You are given relaxing (sedating) medicine through an IV line. You may be drowsy, or fully asleep.  · The healthcare provider will first give you a physical exam to  check for anal and rectal problems.  · Then the anus is lubricated and the scope inserted.  · If you are awake, you may have a feeling similar to needing to have a bowel movement. You may also feel pressure as air is pumped into the colon. Its OK to pass gas during the procedure.  · Biopsy, polyp removal, or other treatments may be done during the test.  After the test   You may have gas right after the test. It can help to try to pass it to help prevent later bloating. Your healthcare provider may discuss the results with you right away. Or you may need to schedule a follow-up visit to talk about the results. After the test, you can go back to your normal eating and other activities. You may be tired from the sedation and need to rest for a few hours.  Date Last Reviewed: 11/1/2016 © 2000-2017 [employer]. [address] [place] [address], [place] [address]. All rights reserved. This information is not intended as a substitute for professional medical care. Always follow your healthcare professional's instructions.

## 2020-03-20 NOTE — PROGRESS NOTES
Patient states ready for discharge. Patient reports pain relief. Patient educated on discharge instructions. Patient verbalizes understanding.

## 2020-03-20 NOTE — H&P
COLONOSCOPY HISTORY AND PHYSICAL EXAM    Procedure : Colonoscopy      INDICATIONS: rectal bleeding    Family Hx of CRC: no    Last Colonoscopy:  2015  Findings: normal       Past Medical History:   Diagnosis Date    Chronic neck pain     Frequent PVCs     Hyperlipidemia      Sedation Problems: NO  Family History   Problem Relation Age of Onset    Ulcers Mother     Heart disease Mother     Diabetes Mother     Ulcers Father     Stroke Father     Stroke Paternal Grandmother     Cancer Paternal Grandfather 64        CRC     Fam Hx of Sedation Problems: NO  Social History     Socioeconomic History    Marital status:      Spouse name: Not on file    Number of children: Not on file    Years of education: Not on file    Highest education level: Not on file   Occupational History    Not on file   Social Needs    Financial resource strain: Not on file    Food insecurity:     Worry: Not on file     Inability: Not on file    Transportation needs:     Medical: Not on file     Non-medical: Not on file   Tobacco Use    Smoking status: Former Smoker   Substance and Sexual Activity    Alcohol use: Yes     Comment: 2-3 drinks on weekend days, 5 drinks per week    Drug use: Not on file    Sexual activity: Not on file   Lifestyle    Physical activity:     Days per week: Not on file     Minutes per session: Not on file    Stress: Not on file   Relationships    Social connections:     Talks on phone: Not on file     Gets together: Not on file     Attends [employer] service: Not on file     Active member of club or organization: Not on file     Attends meetings of clubs or organizations: Not on file     Relationship status: Not on file   Other Topics Concern    Not on file   Social History Narrative    Lives with partner 27 years. No kids. Works [employer]. Walks dog for exercise.       Review of Systems - Negative except   Respiratory ROS: no dyspnea  Cardiovascular ROS: no exertional chest  "pain  Gastrointestinal ROS: NO abdominal discomfort,  YES intermittent rectal bleeding  Musculoskeletal ROS: no muscular pain  Neurological ROS: no recent stroke    Physical Exam:  BP (!) 140/78   Pulse 70   Temp 99.3 °F (37.4 °C)   Resp 14   Ht 5' 10" (1.778 m)   Wt 108.9 kg (240 lb)   SpO2 97%   BMI 34.44 kg/m²   General: no distress  Head: normocephalic  Mallampati Score   Neck: supple, symmetrical, trachea midline  Lungs:  clear to auscultation bilaterally and normal respiratory effort  Heart: regular rate and rhythm and no murmur  Abdomen: soft, non-tender non-distented; bowel sounds normal; no masses,  no organomegaly  Extremities: no cyanosis or edema, or clubbing    ASA:  II    PLAN  COLONOSCOPY.    SedationPlan :MAC    The details of the procedure, the possible need for biopsy or polypectomy and the potential risks including bleeding, perforation, missed polyps were discussed in detail.      "

## 2020-03-20 NOTE — ANESTHESIA POSTPROCEDURE EVALUATION
Anesthesia Post Evaluation    Patient: Gee Montez    Procedure(s) Performed: Procedure(s) (LRB):  COLONOSCOPY (N/A)    Final Anesthesia Type: general    Patient location during evaluation: PACU  Patient participation: Yes- Able to Participate  Level of consciousness: awake and alert  Post-procedure vital signs: reviewed and stable  Pain management: adequate  Airway patency: patent    PONV status at discharge: No PONV  Anesthetic complications: no      Cardiovascular status: blood pressure returned to baseline  Respiratory status: unassisted  Hydration status: euvolemic  Follow-up not needed.          Vitals Value Taken Time   /76 3/20/2020  8:47 AM   Temp 36.6 °C (97.9 °F) 3/20/2020  7:53 AM   Pulse 62 3/20/2020  8:47 AM   Resp 18 3/20/2020  8:47 AM   SpO2 96 % 3/20/2020  8:47 AM         Event Time     Out of Recovery 09:00:22          Pain/Tyree Score: Pain Rating Prior to Med Admin: 8 (3/20/2020  8:17 AM)  Tyree Score: 10 (3/20/2020  8:23 AM)

## 2020-03-20 NOTE — TRANSFER OF CARE
"Anesthesia Transfer of Care Note    Patient: Gee Montez    Procedure(s) Performed: Procedure(s) (LRB):  COLONOSCOPY (N/A)    Patient location: PACU    Anesthesia Type: general    Transport from OR: Transported from OR on room air with adequate spontaneous ventilation    Post pain: adequate analgesia    Post assessment: no apparent anesthetic complications and tolerated procedure well    Post vital signs: stable    Level of consciousness: awake and alert    Nausea/Vomiting: no nausea/vomiting    Complications: none    Transfer of care protocol was followed      Last vitals:   Visit Vitals  BP (!) 140/78   Pulse 70   Temp 37.4 °C (99.3 °F)   Resp 14   Ht 5' 10" (1.778 m)   Wt 108.9 kg (240 lb)   SpO2 97%   BMI 34.44 kg/m²     "

## 2020-03-20 NOTE — [employer] PATIENT INSTRUCTIONS
Discharge Summary/Instructions after an Endoscopic Procedure  Patient Name: Gee Montez  Patient MRN: [id number]  Patient YOB: 1965 Friday, March 20, 2020  Korey Arteaga MD  RESTRICTIONS:  During your procedure today, you received medications for sedation.  These   medications may affect your judgment, balance and coordination.  Therefore,   for 24 hours, you have the following restrictions:   - DO NOT drive a car, operate machinery, make legal/financial decisions,   sign important papers or drink alcohol.    ACTIVITY:  Today: no heavy lifting, straining or running due to procedural   sedation/anesthesia.  The following day: return to full activity including work.  DIET:  Eat and drink normally unless instructed otherwise.     TREATMENT FOR COMMON SIDE EFFECTS:  - Mild abdominal pain, nausea, belching, bloating or excessive gas:  rest,   eat lightly and use a heating pad.  - Sore Throat: treat with throat lozenges and/or gargle with warm salt   water.  - Because air was used during the procedure, expelling large amounts of air   from your rectum or belching is normal.  - If a bowel prep was taken, you may not have a bowel movement for 1-3 days.    This is normal.  SYMPTOMS TO WATCH FOR AND REPORT TO YOUR PHYSICIAN:  1. Abdominal pain or bloating, other than gas cramps.  2. Chest pain.  3. Back pain.  4. Signs of infection such as: chills or fever occurring within 24 hours   after the procedure.  5. Rectal bleeding, which would show as bright red, maroon, or black stools.   (A tablespoon of blood from the rectum is not serious, especially if   hemorrhoids are present.)  6. Vomiting.  7. Weakness or dizziness.  GO DIRECTLY TO THE NEAREST EMERGENCY ROOM IF YOU HAVE ANY OF THE FOLLOWING:      Difficulty breathing              Chills and/or fever over 101 F   Persistent vomiting and/or vomiting blood   Severe abdominal pain   Severe chest pain   Black, tarry stools   Bleeding- more than one  tablespoon   Any other symptom or condition that you feel may need urgent attention  Your doctor recommends these additional instructions:  If any biopsies were taken, your doctors clinic will contact you in 1 to 2   weeks with any results.  - Discharge patient to home (ambulatory).   - Resume previous diet.   - Continue present medications.   - Repeat colonoscopy in 10 years for screening purposes.  For questions, problems or results please call your physician - Korey Arteaga MD at Work:  [phone].  [facility], EMERGENCY ROOM PHONE NUMBER: [phone]  IF A COMPLICATION OR EMERGENCY SITUATION ARISES AND YOU ARE UNABLE TO REACH   YOUR PHYSICIAN - GO DIRECTLY TO THE EMERGENCY ROOM.  Korey Arteaga MD  3/20/2020 7:56:50 AM  This report has been verified and signed electronically.  [employer]

## 2020-04-21 DIAGNOSIS — Z01.84 ANTIBODY RESPONSE EXAMINATION: ICD-10-CM

## 2020-05-05 ENCOUNTER — LAB VISIT (OUTPATIENT)
Dept: LAB | Facility: HOSPITAL | Age: 55
End: 2020-05-05
Attending: INTERNAL MEDICINE
Payer: COMMERCIAL

## 2020-05-05 DIAGNOSIS — Z01.84 ANTIBODY RESPONSE EXAMINATION: ICD-10-CM

## 2020-05-05 LAB — SARS-COV-2 IGG SERPLBLD QL IA.RAPID: NEGATIVE

## 2020-05-05 PROCEDURE — 86769 SARS-COV-2 COVID-19 ANTIBODY: CPT

## 2020-05-05 PROCEDURE — 36415 COLL VENOUS BLD VENIPUNCTURE: CPT

## 2020-05-18 ENCOUNTER — PATIENT MESSAGE (OUTPATIENT)
Dept: INTERNAL MEDICINE | Facility: CLINIC | Age: 55
End: 2020-05-18

## 2020-05-18 ENCOUNTER — OFFICE VISIT (OUTPATIENT)
Dept: INTERNAL MEDICINE | Facility: CLINIC | Age: 55
End: 2020-05-18
Payer: COMMERCIAL

## 2020-05-18 VITALS
HEART RATE: 58 BPM | HEIGHT: 70 IN | SYSTOLIC BLOOD PRESSURE: 134 MMHG | DIASTOLIC BLOOD PRESSURE: 84 MMHG | WEIGHT: 240.94 LBS | BODY MASS INDEX: 34.49 KG/M2 | OXYGEN SATURATION: 97 %

## 2020-05-18 DIAGNOSIS — Z13.220 LIPID SCREENING: ICD-10-CM

## 2020-05-18 DIAGNOSIS — R42 LIGHTHEADEDNESS: ICD-10-CM

## 2020-05-18 DIAGNOSIS — R03.0 PREHYPERTENSION: ICD-10-CM

## 2020-05-18 DIAGNOSIS — R20.0 NUMBNESS AND TINGLING IN RIGHT HAND: Primary | ICD-10-CM

## 2020-05-18 DIAGNOSIS — Z12.5 SCREENING PSA (PROSTATE SPECIFIC ANTIGEN): ICD-10-CM

## 2020-05-18 DIAGNOSIS — R20.2 NUMBNESS AND TINGLING IN RIGHT HAND: Primary | ICD-10-CM

## 2020-05-18 DIAGNOSIS — Z13.1 SCREENING FOR DIABETES MELLITUS: ICD-10-CM

## 2020-05-18 PROCEDURE — 99999 PR PBB SHADOW E&M-EST. PATIENT-LVL III: ICD-10-PCS | Mod: PBBFAC,,, | Performed by: INTERNAL MEDICINE

## 2020-05-18 PROCEDURE — 3008F PR BODY MASS INDEX (BMI) DOCUMENTED: ICD-10-PCS | Mod: CPTII,S$GLB,, | Performed by: INTERNAL MEDICINE

## 2020-05-18 PROCEDURE — 99214 PR OFFICE/OUTPT VISIT, EST, LEVL IV, 30-39 MIN: ICD-10-PCS | Mod: S$GLB,,, | Performed by: INTERNAL MEDICINE

## 2020-05-18 PROCEDURE — 99999 PR PBB SHADOW E&M-EST. PATIENT-LVL III: CPT | Mod: PBBFAC,,, | Performed by: INTERNAL MEDICINE

## 2020-05-18 PROCEDURE — 99214 OFFICE O/P EST MOD 30 MIN: CPT | Mod: S$GLB,,, | Performed by: INTERNAL MEDICINE

## 2020-05-18 PROCEDURE — 3008F BODY MASS INDEX DOCD: CPT | Mod: CPTII,S$GLB,, | Performed by: INTERNAL MEDICINE

## 2020-05-18 NOTE — PROGRESS NOTES
INTERNAL MEDICINE SAME DAY PRIMARY CARE VISIT NOTE    Subjective:     Chief Complaint: Numbness and Dizziness       Patient ID: Gee Montez is a 54 y.o. male with HLD, hx PVCs on bystolic, chronic neck and back pain, pt of Dr. Arredondo, here today for focused same-day primary care visit.    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  Was wearing his FitBit on his R hand today (usually wears on his L) and states it felt tight and noticed his R ring finger and pinky were numb.  Also felt slight numbness in his R shoulder.  Says he checked his FitBit and his pulse was 95 which is high for him.  Denies cp or palpitations.  Checked his BP which was 138/88.  No R sided sx.      Admits to feeling more stress than usual due to work being busy.  Felt like his mouth felt a little dry and felt a little lightheaded so drank some water which he feels helped but still has slight numbness in his L ring finger and pinky.    Past Medical History:  Past Medical History:   Diagnosis Date    Chronic neck pain     Frequent PVCs     Hyperlipidemia        Home Medications:  Prior to Admission medications    Medication Sig Start Date End Date Taking? Authorizing Provider   atorvastatin (LIPITOR) 10 MG tablet Take 1 tablet (10 mg total) by mouth once daily. 11/5/19  Yes Omar Arredondo MD   BYSTOLIC 5 mg Tab Take 1 tablet (5 mg total) by mouth every evening. 11/5/19  Yes Omar Arredondo MD   docusate sodium (COLACE) 50 MG capsule Take 1 capsule (50 mg total) by mouth 2 (two) times daily. 1/31/17   Maral Shahid MD   meclizine (ANTIVERT) 12.5 mg tablet Take 1 tablet (12.5 mg total) by mouth 3 (three) times daily as needed. 6/11/19   Alyson Frazier MD   sodium,potassium,mag sulfates (SUPREP BOWEL PREP KIT) 17.5-3.13-1.6 gram SolR As directed.  Patient not taking: Reported on 5/18/2020 12/19/19   Korey Arteaga MD   traMADoL (ULTRAM) 50 mg tablet Take 1 tablet (50 mg  "total) by mouth every 6 (six) hours as needed for Pain.  Patient not taking: Reported on 5/18/2020 3/20/20   oKrey Arteaga MD       Allergies:  Review of patient's allergies indicates:   Allergen Reactions    Adhes. band-tape-benzalkonium     Adhesive tape-silicones     Aspirin      Hurts stomach    Benadryl itch relief Nausea Only and Palpitations       Social History:  Social History     Tobacco Use    Smoking status: Former Smoker   Substance Use Topics    Alcohol use: Yes     Comment: 2-3 drinks on weekend days, 5 drinks per week    Drug use: Not on file         Review of Systems   Constitutional: Negative for chills, fatigue and fever.   Respiratory: Negative for cough, chest tightness and shortness of breath.    Cardiovascular: Negative for chest pain.   Gastrointestinal: Negative for abdominal pain and blood in stool.   Genitourinary: Negative for dysuria and frequency.   Musculoskeletal: Positive for arthralgias, back pain and neck pain.   Neurological: Positive for light-headedness (as per HPI) and numbness (as per HPI). Negative for syncope and weakness.           Objective:   /84   Pulse (!) 58   Ht 5' 10" (1.778 m)   Wt 109.3 kg (240 lb 15.4 oz)   SpO2 97%   BMI 34.57 kg/m²        General: AAO x3, no apparent distress  HEENT: PERRL, OP clear  CV: RRR, no m/r/g  Pulm: Lungs CTAB, no crackles, no wheezes  Abd: s/NT/ND +BS  Extremities: no c/c/e; sensation intact on hands and arm.  Neck: ROM wnl, no pain or worsening numbness on rotation.    Labs:         Assessment/Plan     Gee was seen today for numbness and dizziness.    Diagnoses and all orders for this visit:    Numbness and tingling in right hand  Suspect ulnar/cubital tunnel syndrome vs cervical radiculopathy.  Given reassurance.  Can cont f/u in spine clinic for neck issues.  If sx in hand continue can consider evaluation in hand clinic.  Given tips to avoid compression, avoid resting elbow on hard surfaces, avoid " prolonged flexion at the elbow joint, etc.  Prn nsaids okay but can take c food to avoid GI upset.    Lightheadedness  Better p oral hydration.  Discussed need to maintain adequate hydration.  Will check basic labs.  -     CBC auto differential; Future  -     Comprehensive metabolic panel; Future  -     TSH; Future    Prehypertension  Repeat manual a little better. Recommend low sodium diet, < 2g Na/day as well as weight loss and exercise.    Screening for diabetes mellitus  -     Hemoglobin A1C; Future    Lipid screening  -     Lipid Panel; Future    Screening PSA (prostate specific antigen)  -     PSA, Screening; Future    Will check his annual labs as well since he has upcoming appt c Dr. Arredondo for his annual.    RTC prn and with PCP as per routine.    Celina Myles MD  Department of Internal Medicine - [facility]  05/18/2020

## 2020-05-19 ENCOUNTER — LAB VISIT (OUTPATIENT)
Dept: LAB | Facility: HOSPITAL | Age: 55
End: 2020-05-19
Attending: INTERNAL MEDICINE
Payer: COMMERCIAL

## 2020-05-19 ENCOUNTER — PATIENT MESSAGE (OUTPATIENT)
Dept: INTERNAL MEDICINE | Facility: CLINIC | Age: 55
End: 2020-05-19

## 2020-05-19 DIAGNOSIS — R42 LIGHTHEADEDNESS: ICD-10-CM

## 2020-05-19 DIAGNOSIS — Z13.1 SCREENING FOR DIABETES MELLITUS: ICD-10-CM

## 2020-05-19 DIAGNOSIS — Z13.220 LIPID SCREENING: ICD-10-CM

## 2020-05-19 DIAGNOSIS — Z12.5 SCREENING PSA (PROSTATE SPECIFIC ANTIGEN): ICD-10-CM

## 2020-05-19 LAB
ALBUMIN SERPL BCP-MCNC: 4.2 G/DL (ref 3.5–5.2)
ALP SERPL-CCNC: 41 U/L (ref 55–135)
ALT SERPL W/O P-5'-P-CCNC: 42 U/L (ref 10–44)
ANION GAP SERPL CALC-SCNC: 8 MMOL/L (ref 8–16)
AST SERPL-CCNC: 27 U/L (ref 10–40)
BASOPHILS # BLD AUTO: 0.04 K/UL (ref 0–0.2)
BASOPHILS NFR BLD: 0.8 % (ref 0–1.9)
BILIRUB SERPL-MCNC: 0.6 MG/DL (ref 0.1–1)
BUN SERPL-MCNC: 13 MG/DL (ref 6–20)
CALCIUM SERPL-MCNC: 9.2 MG/DL (ref 8.7–10.5)
CHLORIDE SERPL-SCNC: 104 MMOL/L (ref 95–110)
CHOLEST SERPL-MCNC: 175 MG/DL (ref 120–199)
CHOLEST/HDLC SERPL: 3 {RATIO} (ref 2–5)
CO2 SERPL-SCNC: 29 MMOL/L (ref 23–29)
COMPLEXED PSA SERPL-MCNC: 0.52 NG/ML (ref 0–4)
CREAT SERPL-MCNC: 1.1 MG/DL (ref 0.5–1.4)
DIFFERENTIAL METHOD: ABNORMAL
EOSINOPHIL # BLD AUTO: 0.2 K/UL (ref 0–0.5)
EOSINOPHIL NFR BLD: 4.2 % (ref 0–8)
ERYTHROCYTE [DISTWIDTH] IN BLOOD BY AUTOMATED COUNT: 13.4 % (ref 11.5–14.5)
EST. GFR  (AFRICAN AMERICAN): >60 ML/MIN/1.73 M^2
EST. GFR  (NON AFRICAN AMERICAN): >60 ML/MIN/1.73 M^2
ESTIMATED AVG GLUCOSE: 128 MG/DL (ref 68–131)
GLUCOSE SERPL-MCNC: 119 MG/DL (ref 70–110)
HBA1C MFR BLD HPLC: 6.1 % (ref 4–5.6)
HCT VFR BLD AUTO: 51.6 % (ref 40–54)
HDLC SERPL-MCNC: 58 MG/DL (ref 40–75)
HDLC SERPL: 33.1 % (ref 20–50)
HGB BLD-MCNC: 16.1 G/DL (ref 14–18)
IMM GRANULOCYTES # BLD AUTO: 0.01 K/UL (ref 0–0.04)
IMM GRANULOCYTES NFR BLD AUTO: 0.2 % (ref 0–0.5)
LDLC SERPL CALC-MCNC: 94 MG/DL (ref 63–159)
LYMPHOCYTES # BLD AUTO: 1.4 K/UL (ref 1–4.8)
LYMPHOCYTES NFR BLD: 26.9 % (ref 18–48)
MCH RBC QN AUTO: 29.5 PG (ref 27–31)
MCHC RBC AUTO-ENTMCNC: 31.2 G/DL (ref 32–36)
MCV RBC AUTO: 95 FL (ref 82–98)
MONOCYTES # BLD AUTO: 0.7 K/UL (ref 0.3–1)
MONOCYTES NFR BLD: 13 % (ref 4–15)
NEUTROPHILS # BLD AUTO: 2.8 K/UL (ref 1.8–7.7)
NEUTROPHILS NFR BLD: 54.9 % (ref 38–73)
NONHDLC SERPL-MCNC: 117 MG/DL
NRBC BLD-RTO: 0 /100 WBC
PLATELET # BLD AUTO: 196 K/UL (ref 150–350)
PMV BLD AUTO: 10.3 FL (ref 9.2–12.9)
POTASSIUM SERPL-SCNC: 5.2 MMOL/L (ref 3.5–5.1)
PROT SERPL-MCNC: 7.2 G/DL (ref 6–8.4)
RBC # BLD AUTO: 5.45 M/UL (ref 4.6–6.2)
SODIUM SERPL-SCNC: 141 MMOL/L (ref 136–145)
TRIGL SERPL-MCNC: 115 MG/DL (ref 30–150)
TSH SERPL DL<=0.005 MIU/L-ACNC: 1.7 UIU/ML (ref 0.4–4)
WBC # BLD AUTO: 5.06 K/UL (ref 3.9–12.7)

## 2020-05-19 PROCEDURE — 84153 ASSAY OF PSA TOTAL: CPT

## 2020-05-19 PROCEDURE — 84443 ASSAY THYROID STIM HORMONE: CPT

## 2020-05-19 PROCEDURE — 36415 COLL VENOUS BLD VENIPUNCTURE: CPT

## 2020-05-19 PROCEDURE — 83036 HEMOGLOBIN GLYCOSYLATED A1C: CPT

## 2020-05-19 PROCEDURE — 85025 COMPLETE CBC W/AUTO DIFF WBC: CPT

## 2020-05-19 PROCEDURE — 80053 COMPREHEN METABOLIC PANEL: CPT

## 2020-05-19 PROCEDURE — 80061 LIPID PANEL: CPT

## 2020-05-21 ENCOUNTER — TELEPHONE (OUTPATIENT)
Dept: INTERNAL MEDICINE | Facility: CLINIC | Age: 55
End: 2020-05-21

## 2020-05-21 DIAGNOSIS — E87.5 HYPERKALEMIA: Primary | ICD-10-CM

## 2020-05-21 NOTE — TELEPHONE ENCOUNTER
Attempted to call regarding lab results.  Left VM to call back to discuss.  Will send portal message.

## 2020-05-25 ENCOUNTER — PATIENT MESSAGE (OUTPATIENT)
Dept: INTERNAL MEDICINE | Facility: CLINIC | Age: 55
End: 2020-05-25

## 2020-05-26 ENCOUNTER — LAB VISIT (OUTPATIENT)
Dept: LAB | Facility: HOSPITAL | Age: 55
End: 2020-05-26
Attending: INTERNAL MEDICINE
Payer: COMMERCIAL

## 2020-05-26 DIAGNOSIS — E87.5 HYPERKALEMIA: ICD-10-CM

## 2020-05-26 LAB — POTASSIUM SERPL-SCNC: 5.1 MMOL/L (ref 3.5–5.1)

## 2020-05-26 PROCEDURE — 36415 COLL VENOUS BLD VENIPUNCTURE: CPT

## 2020-05-26 PROCEDURE — 84132 ASSAY OF SERUM POTASSIUM: CPT

## 2020-05-27 ENCOUNTER — OFFICE VISIT (OUTPATIENT)
Dept: INTERNAL MEDICINE | Facility: CLINIC | Age: 55
End: 2020-05-27
Payer: COMMERCIAL

## 2020-05-27 VITALS
DIASTOLIC BLOOD PRESSURE: 84 MMHG | HEART RATE: 70 BPM | HEIGHT: 70 IN | BODY MASS INDEX: 34.34 KG/M2 | SYSTOLIC BLOOD PRESSURE: 130 MMHG | WEIGHT: 239.88 LBS | OXYGEN SATURATION: 96 %

## 2020-05-27 DIAGNOSIS — R42 EPISODIC LIGHTHEADEDNESS: ICD-10-CM

## 2020-05-27 DIAGNOSIS — Z00.00 VISIT FOR ANNUAL HEALTH EXAMINATION: Primary | ICD-10-CM

## 2020-05-27 DIAGNOSIS — R73.03 PREDIABETES: ICD-10-CM

## 2020-05-27 DIAGNOSIS — E78.2 MIXED HYPERLIPIDEMIA: ICD-10-CM

## 2020-05-27 DIAGNOSIS — I49.3 FREQUENT PVCS: ICD-10-CM

## 2020-05-27 PROCEDURE — 99396 PREV VISIT EST AGE 40-64: CPT | Mod: S$GLB,,, | Performed by: INTERNAL MEDICINE

## 2020-05-27 PROCEDURE — 99999 PR PBB SHADOW E&M-EST. PATIENT-LVL III: ICD-10-PCS | Mod: PBBFAC,,, | Performed by: INTERNAL MEDICINE

## 2020-05-27 PROCEDURE — 99396 PR PREVENTIVE VISIT,EST,40-64: ICD-10-PCS | Mod: S$GLB,,, | Performed by: INTERNAL MEDICINE

## 2020-05-27 PROCEDURE — 99999 PR PBB SHADOW E&M-EST. PATIENT-LVL III: CPT | Mod: PBBFAC,,, | Performed by: INTERNAL MEDICINE

## 2020-05-27 NOTE — PROGRESS NOTES
INTERNAL MEDICINE INITIAL VISIT NOTE      CHIEF COMPLAINT     Chief Complaint   Patient presents with    Establish Care    Annual Exam       HPI     Gee Montez is a 54 y.o.  male who presents with  HLD, hx PVCs on bystolic, chronic neck and back pain, former pt of Dr. Braga, here today to establish care and for annual exam.    Was seen by me for focused visit 2 weeks ago for numbness in his R ring finger and pinky as well as his R shoulder.  At that time was managed conservatively.  Had subsequent labs done which showed preDM c A1C 6.1 and hyperkalemia, the latter of which normalized on repeat.    Reports he drinks a protein drink each morning that has K and was also drinking a beet juice that contained extra K which he has since stopped.    Still c intermittent lightheadedness that is better when he stays hydrated.  Work still stressful and has been taking a caffeine pill every morning.  States he used to drink a lot of coffee and got bad headaches when he stopped.      Past Medical History:  Past Medical History:   Diagnosis Date    Chronic neck pain     Frequent PVCs     Hyperlipidemia        Past Surgical History:  Past Surgical History:   Procedure Laterality Date    COLONOSCOPY N/A 3/20/2020    Procedure: COLONOSCOPY;  Surgeon: Korey Arteaga MD;  Location: [facility] ([address] [place] [address]);  Service: Endoscopy;  Laterality: N/A;  3/16 - confirmed case and new arrival time and patient aware of new policies-   3/18/20 - confirmed new arrival time on 0630, drop off policy reviewed, 2nd half prep completed from [id number], Pt verbalized understanding- ERW  3/19/20-patient notified of updated drop o       Home Medications:  Prior to Admission medications    Medication Sig Start Date End Date Taking? Authorizing Provider   atorvastatin (LIPITOR) 10 MG tablet Take 1 tablet (10 mg total) by mouth once daily. 11/5/19   Omar Arredondo MD   BYSTOLIC 5 mg Tab Take 1 tablet (5 mg total) by mouth every  evening. 11/5/19   Omar Arredondo MD       Allergies:  Review of patient's allergies indicates:   Allergen Reactions    Adhes. band-tape-benzalkonium     Adhesive tape-silicones     Aspirin      Hurts stomach    Benadryl itch relief Nausea Only and Palpitations       Family History:  Family History   Problem Relation Age of Onset    Ulcers Mother     Heart disease Mother     Diabetes Mother     Ulcers Father     Stroke Father     Stroke Paternal Grandmother     Cancer Paternal Grandfather 64        CRC       Social History:  Social History     Tobacco Use    Smoking status: Former Smoker   Substance Use Topics    Alcohol use: Yes     Frequency: 4 or more times a week     Drinks per session: 1 or 2     Binge frequency: Never     Comment: 2-3 drinks on weekend days, 5 drinks per week    Drug use: Not on file       Review of Systems:  Review of Systems   Constitutional: Positive for activity change and unexpected weight change.   HENT: Negative for hearing loss, rhinorrhea and trouble swallowing.    Eyes: Negative for discharge and visual disturbance.   Respiratory: Negative for chest tightness and wheezing.    Cardiovascular: Positive for palpitations. Negative for chest pain.   Gastrointestinal: Negative for blood in stool, constipation, diarrhea and vomiting.   Endocrine: Negative for polydipsia and polyuria.   Genitourinary: Negative for difficulty urinating, hematuria and urgency.   Musculoskeletal: Positive for arthralgias and joint swelling. Negative for neck pain.   Neurological: Negative for weakness and headaches.   Psychiatric/Behavioral: Negative for confusion and dysphoric mood.       Health Maintenance:   Immunizations:   Influenza 10/2019  TDap 7/2017  Prevnar 13 rec at 64  Shingrix rec today, pt declined but agreed to reassess at f/u    Cancer Screening:  Colonoscopy:  3/2020 c hemorrhoids s/p banding, no polyps, rec repeat in 10 yrs  PSA 5/2020 wnl      PHYSICAL EXAM     /84 (BP  "Location: Right arm, Patient Position: Sitting, BP Method: Large (Manual))   Pulse 70   Ht 5' 10" (1.778 m)   Wt 108.8 kg (239 lb 13.8 oz)   SpO2 96%   BMI 34.42 kg/m²     GEN - A+OX4, NAD   HEENT - PERRL, EOMI, OP clear  Neck - No thyromegaly or thyroid masses felt.  No cervical lymphadenopathy appreciated.  CV - RRR, no m/r/g  Chest - CTAB, no wheezing, crackles, or rhonchi  Abd - S/NT/ND/+BS.   Ext - 2+BDP. No C/C/E.  LN - No LAD appreciated.  Skin - Normal color and texture, no rash, no skin lesions.      LABS     Lab Results   Component Value Date    WBC 5.06 05/19/2020    HGB 16.1 05/19/2020    HCT 51.6 05/19/2020    MCV 95 05/19/2020     05/19/2020     Sodium   Date Value Ref Range Status   05/19/2020 141 136 - 145 mmol/L Final     Potassium   Date Value Ref Range Status   05/26/2020 5.1 3.5 - 5.1 mmol/L Final     Chloride   Date Value Ref Range Status   05/19/2020 104 95 - 110 mmol/L Final     CO2   Date Value Ref Range Status   05/19/2020 29 23 - 29 mmol/L Final     Glucose   Date Value Ref Range Status   05/19/2020 119 (H) 70 - 110 mg/dL Final     BUN, Bld   Date Value Ref Range Status   05/19/2020 13 6 - 20 mg/dL Final     Creatinine   Date Value Ref Range Status   05/19/2020 1.1 0.5 - 1.4 mg/dL Final     Calcium   Date Value Ref Range Status   05/19/2020 9.2 8.7 - 10.5 mg/dL Final     Total Protein   Date Value Ref Range Status   05/19/2020 7.2 6.0 - 8.4 g/dL Final     Albumin   Date Value Ref Range Status   05/19/2020 4.2 3.5 - 5.2 g/dL Final     Total Bilirubin   Date Value Ref Range Status   05/19/2020 0.6 0.1 - 1.0 mg/dL Final     Comment:     For infants and newborns, interpretation of results should be based  on gestational age, weight and in agreement with clinical  observations.  Premature Infant recommended reference ranges:  Up to 24 hours.............<8.0 mg/dL  Up to 48 hours............<12.0 mg/dL  3-5 days..................<15.0 mg/dL  6-29 days.................<15.0 mg/dL   "     Alkaline Phosphatase   Date Value Ref Range Status   05/19/2020 41 (L) 55 - 135 U/L Final     AST   Date Value Ref Range Status   05/19/2020 27 10 - 40 U/L Final     ALT   Date Value Ref Range Status   05/19/2020 42 10 - 44 U/L Final     Anion Gap   Date Value Ref Range Status   05/19/2020 8 8 - 16 mmol/L Final     eGFR if    Date Value Ref Range Status   05/19/2020 >60.0 >60 mL/min/1.73 m^2 Final     eGFR if non    Date Value Ref Range Status   05/19/2020 >60.0 >60 mL/min/1.73 m^2 Final     Comment:     Calculation used to obtain the estimated glomerular filtration  rate (eGFR) is the CKD-EPI equation.        Lab Results   Component Value Date    HGBA1C 6.1 (H) 05/19/2020     Lab Results   Component Value Date    LDLCALC 94.0 05/19/2020     Lab Results   Component Value Date    TSH 1.701 05/19/2020       ASSESSMENT/PLAN     Gee Montez is a 54 y.o. male with  Gee was seen today for establish care and annual exam.    Diagnoses and all orders for this visit:    Visit for annual health examination  History and physical exam completed.  Health maintenance reviewed as above.    Prediabetes  Pt was counseled on the need to avoid intake of simple sugars and minimize carbohydrates.  Also recommended weight loss and daily exercise.  Will reassess at f/u and if still high will consider Metformin  -     Comprehensive metabolic panel; Future; Expected date: 05/27/2020  -     Hemoglobin A1C; Future; Expected date: 05/27/2020    Mixed hyperlipidemia  Lab Results   Component Value Date    LDLCALC 94.0 05/19/2020     Well controlled on Lipitor, cont meds  -     Lipid Panel; Future; Expected date: 05/27/2020    Frequent PVCs  Better on bystolic, advised to wean off caffeine as it can have s/e lightheadedness and can worsen PVCs.  Given instructions on how to taper off.    Episodic lightheadedness  As per HPI  Cont increasing hydration  Weaning off caffeine  Consider stopping or changing BB  if sx persist but would like defer to Cards.    HM as above    RTC in 4 months c fasting labs prior.    Celina Myles MD  Department of Internal Medicine - [facility]  05/27/2020

## 2020-06-25 ENCOUNTER — TELEPHONE (OUTPATIENT)
Dept: INTERNAL MEDICINE | Facility: CLINIC | Age: 55
End: 2020-06-25

## 2020-06-25 ENCOUNTER — PATIENT MESSAGE (OUTPATIENT)
Dept: INTERNAL MEDICINE | Facility: CLINIC | Age: 55
End: 2020-06-25

## 2020-06-25 NOTE — PROGRESS NOTES
"INTERNAL MEDICINE CLINIC - SAME DAY APPOINTMENT  Progress Note    PRESENTING HISTORY     PCP: Celina Myles MD  Chief Complaint/Reason for Visit:   No chief complaint on file.      History of Present Illness & ROS : Mr. Gee Montez is a 54 y.o. male.    Here for Same Day appt. Very pleasant gentleman. New to this Provider.   Es'd with Dr. Myles.   Chart reviewed with notations made to most recent Virtual visit with his Primary.     Reports that BP, while at work: 162/92,then with a repeat 30 mins later, resulted at 141/86, without intervention, and then checked again, and declined to 139/86.   He does work in the "[employer] center and have been dealing with stress; have been drinking less coffee, has been a tough transition".     He is complaining of "feeling lightheaded and dizzy at various times during the day". Denies chest pain and SOB. No headaches or change in vision.   No nausea, vomiting or abd pain. No falls or "feeling of passing out".      Review of Systems:  Eyes: denies visual changes at this time denies floaters   ENT: no nasal congestion or sore throat  Respiratory: no cough or shorness of breath  Cardiovascular: no chest pain or palpitations  Gastrointestinal: no nausea or vomiting, no abdominal pain or change in bowel habits  Genitourinary: no hematuria or dysuria; denies frequency  Hematologic/Lymphatic: no easy bruising or lymphadenopathy  Musculoskeletal: no arthralgias or myalgias  Neurological: no seizures or tremors  Endocrine: no heat or cold intolerance      PAST HISTORY:     Past Medical History:   Diagnosis Date    Chronic neck pain     Frequent PVCs     Hyperlipidemia        Past Surgical History:   Procedure Laterality Date    COLONOSCOPY N/A 3/20/2020    Procedure: COLONOSCOPY;  Surgeon: Korey Arteaga MD;  Location: [address];  Service: Endoscopy;  Laterality: N/A;  3/16 - confirmed case and new arrival time and patient aware of new policies-   3/18/20 - confirmed " new arrival time on 0630, drop off policy reviewed, 2nd half prep completed from [id number], Pt verbalized understanding- ERW  3/19/20-patient notified of updated drop o       Family History   Problem Relation Age of Onset    Ulcers Mother     Heart disease Mother     Diabetes Mother     Ulcers Father     Stroke Father     Stroke Paternal Grandmother     Cancer Paternal Grandfather 64        CRC       Social History     Socioeconomic History    Marital status:      Spouse name: Not on file    Number of children: Not on file    Years of education: Not on file    Highest education level: Not on file   Occupational History    Not on file   Social Needs    Financial resource strain: Not hard at all    Food insecurity     Worry: Never true     Inability: Never true    Transportation needs     Medical: No     Non-medical: No   Tobacco Use    Smoking status: Former Smoker   Substance and Sexual Activity    Alcohol use: Yes     Frequency: 4 or more times a week     Drinks per session: 1 or 2     Binge frequency: Never     Comment: 2-3 drinks on weekend days, 5 drinks per week    Drug use: Not on file    Sexual activity: Not on file   Lifestyle    Physical activity     Days per week: 1 day     Minutes per session: 60 min    Stress: To some extent   Relationships    Social connections     Talks on phone: More than three times a week     Gets together: Once a week     Attends Zoroastrian service: Not on file     Active member of club or organization: No     Attends meetings of clubs or organizations: Never     Relationship status:    Other Topics Concern    Not on file   Social History Narrative    Lives with partner 27 years. No kids. Works [employer]. Walks dog for exercise.       MEDICATIONS & ALLERGIES:     Current Outpatient Medications on File Prior to Visit   Medication Sig Dispense Refill    atorvastatin (LIPITOR) 10 MG tablet Take 1 tablet (10 mg total) by mouth once daily. 90  tablet 11    BYSTOLIC 5 mg Tab Take 1 tablet (5 mg total) by mouth every evening. 90 tablet 11     No current facility-administered medications on file prior to visit.         Review of patient's allergies indicates:   Allergen Reactions    Adhes. band-tape-benzalkonium     Adhesive tape-silicones     Aspirin      Hurts stomach    Benadryl itch relief Nausea Only and Palpitations       Medications Reconciliation:   I have reconciled the patient's home medications with the patient/family. I have updated all changes.  Refer to After-Visit Medication List.    OBJECTIVE:     Vital Signs:  There were no vitals filed for this visit.  Wt Readings from Last 1 Encounters:   05/27/20 0938 108.8 kg (239 lb 13.8 oz)     There is no height or weight on file to calculate BMI.     Wt Readings from Last 3 Encounters:   06/26/20 108.7 kg (239 lb 10.2 oz)   05/27/20 108.8 kg (239 lb 13.8 oz)   05/18/20 109.3 kg (240 lb 15.4 oz)     Temp Readings from Last 3 Encounters:   03/20/20 97.9 °F (36.6 °C)   06/11/19 98 °F (36.7 °C)   01/18/18 99.2 °F (37.3 °C) (Oral)     BP Readings from Last 3 Encounters:   06/26/20 120/80   05/27/20 130/84   05/18/20 134/84     Pulse Readings from Last 3 Encounters:   06/26/20 70   05/27/20 70   05/18/20 (!) 58        Physical Exam:  General: Well developed, well nourished. No distress.  HEENT: Head is normocephalic, atraumatic; ears are normal.   Eyes: Clear conjunctiva.  Neck: Supple, symmetrical neck; trachea midline. No carotid bruit or thrill appreciated.   Lungs: Clear to auscultation bilaterally and normal respiratory effort.  Cardiovascular: Heart with regular rate and rhythm. No murmurs, gallops or rubs  Extremities: No LE edema. Pulses 2+ and symmetric.   Abdomen: Abdomen is soft, non-tender non-distended with normal bowel sounds.  Skin: Skin color, texture, turgor normal. No rashes.  Musculoskeletal: Normal gait.   Lymph Nodes: No cervical or supraclavicular adenopathy.  Neurologic: Normal  "strength and tone. No focal numbness or weakness.   Psychiatric: Not depressed.      Laboratory  Lab Results   Component Value Date    WBC 5.06 2020    HGB 16.1 2020    HCT 51.6 2020     2020    CHOL 175 2020    TRIG 115 2020    HDL 58 2020    ALT 42 2020    AST 27 2020     2020    K 5.1 2020     2020    CREATININE 1.1 2020    BUN 13 2020    CO2 29 2020    TSH 1.701 2020    PSA 0.52 2020    HGBA1C 6.1 (H) 2020       ASSESSMENT & PLAN:     Here for a Same Day Appointment.     Noted the clinic visit with  on 2020, BP at the time: 130/84. Notations made to "lightheadedness" with recommendations to "wean off the caffeine", consideration of changing from BB and referring to Cardiology.   *Last EK2016: sinus Yon, 54 rate.     Fluctuating blood pressure  Lightheadedness / Dizziness:   ? Underlying pathology  ? BP related  ? Cardiac related  ? Anxiety related    Today: 12 lead EKG: Sinus Bradycardia (57)   (History of PVCs, on BB for Control)  Normal TSH level in 2020  Lab Results   Component Value Date    TSH 1.701 2020   Highly suspect the fluctuations in BP may be Anxiety induced related.   Today: 120/80   ` suggested that we continue the Bystolic 5 mg daily   ` Consider referral to Cardiology if not improving  -     CV Ultrasound Bilateral Doppler Carotid; Future  ` check routine labs today       RICHARDSON (generalized anxiety disorder):   Symptoms correlate   ` repeat TSH  *He "prefers" not starting daily routine dosing of SSRI or SNRI, but amendable to starting PRN Atrax. He would likely benefit from being commenced to a daily routine dose of low dose Lexapro at this time, while continuing to be eval'd for other possible attributing etiologies in relation to the "dizziness and lightheadedness" with variance in BP.   Will defer further to his PCP.     Other orders  -    "  hydrOXYzine HCL (ATARAX) 25 MG tablet; Take 1 tablet (25 mg total) by mouth 3 (three) times daily as needed.  Dispense: 30 tablet; Refill: 0      Future Appointments   Date Time Provider Department Center   6/26/2020  2:00 PM VASCULAR, CARDIOLOGY [facility] LUISMACHO Villalobos   9/23/2020  7:30 AM LAB, APPOINTMENT [facility]   9/30/2020 11:30 AM Celina Myles MD [facility]        Medication List          Accurate as of June 26, 2020 12:17 PM. If you have any questions, ask your nurse or doctor.            START taking these medications    hydrOXYzine HCL 25 MG tablet  Commonly known as: ATARAX  Take 1 tablet (25 mg total) by mouth 3 (three) times daily as needed.  Started by: BUBBA Ly        CONTINUE taking these medications    atorvastatin 10 MG tablet  Commonly known as: LIPITOR  Take 1 tablet (10 mg total) by mouth once daily.     BYSTOLIC 5 MG Tab  Generic drug: nebivoloL  Take 1 tablet (5 mg total) by mouth every evening.           Where to Get Your Medications      These medications were sent to [facility]  [address] [place] [id number]    Hours: Mon-Fri, 8a-5:30p Phone: [phone]   · hydrOXYzine HCL 25 MG tablet       Signing Physician:  BUBBA Ly

## 2020-06-26 ENCOUNTER — CLINICAL SUPPORT (OUTPATIENT)
Dept: CARDIOLOGY | Facility: CLINIC | Age: 55
End: 2020-06-26
Attending: NURSE PRACTITIONER
Payer: COMMERCIAL

## 2020-06-26 ENCOUNTER — OFFICE VISIT (OUTPATIENT)
Dept: INTERNAL MEDICINE | Facility: CLINIC | Age: 55
End: 2020-06-26
Payer: COMMERCIAL

## 2020-06-26 VITALS
WEIGHT: 239.63 LBS | HEART RATE: 70 BPM | DIASTOLIC BLOOD PRESSURE: 80 MMHG | SYSTOLIC BLOOD PRESSURE: 120 MMHG | BODY MASS INDEX: 34.3 KG/M2 | OXYGEN SATURATION: 97 % | HEIGHT: 70 IN

## 2020-06-26 DIAGNOSIS — R42 LIGHTHEADEDNESS: ICD-10-CM

## 2020-06-26 DIAGNOSIS — F41.1 GAD (GENERALIZED ANXIETY DISORDER): ICD-10-CM

## 2020-06-26 DIAGNOSIS — I99.8 FLUCTUATING BLOOD PRESSURE: ICD-10-CM

## 2020-06-26 DIAGNOSIS — R42 DIZZINESS: ICD-10-CM

## 2020-06-26 DIAGNOSIS — R42 LIGHTHEADEDNESS: Primary | ICD-10-CM

## 2020-06-26 LAB
LEFT ARM DIASTOLIC BLOOD PRESSURE: 88 MMHG
LEFT ARM SYSTOLIC BLOOD PRESSURE: 126 MMHG
LEFT CBA DIAS: 32 CM/S
LEFT CBA SYS: 114 CM/S
LEFT CCA DIST DIAS: 19 CM/S
LEFT CCA DIST SYS: 118 CM/S
LEFT CCA MID DIAS: 24 CM/S
LEFT CCA MID SYS: 118 CM/S
LEFT CCA PROX DIAS: 24 CM/S
LEFT CCA PROX SYS: 101 CM/S
LEFT ECA DIAS: 16 CM/S
LEFT ECA SYS: 70 CM/S
LEFT ICA DIST DIAS: 24 CM/S
LEFT ICA DIST SYS: 66 CM/S
LEFT ICA MID DIAS: 29 CM/S
LEFT ICA MID SYS: 87 CM/S
LEFT ICA PROX DIAS: 20 CM/S
LEFT ICA PROX SYS: 73 CM/S
LEFT VERTEBRAL DIAS: 14 CM/S
LEFT VERTEBRAL SYS: 50 CM/S
OHS CV CAROTID RIGHT ICA EDV HIGHEST: 36
OHS CV CAROTID ULTRASOUND LEFT ICA/CCA RATIO: 0.74
OHS CV CAROTID ULTRASOUND RIGHT ICA/CCA RATIO: 0.76
OHS CV PV CAROTID LEFT HIGHEST CCA: 118
OHS CV PV CAROTID LEFT HIGHEST ICA: 87
OHS CV PV CAROTID RIGHT HIGHEST CCA: 118
OHS CV PV CAROTID RIGHT HIGHEST ICA: 90
OHS CV US CAROTID LEFT HIGHEST EDV: 29
RIGHT ARM DIASTOLIC BLOOD PRESSURE: 85 MMHG
RIGHT ARM SYSTOLIC BLOOD PRESSURE: 131 MMHG
RIGHT CBA DIAS: 22 CM/S
RIGHT CBA SYS: 108 CM/S
RIGHT CCA DIST DIAS: 30 CM/S
RIGHT CCA DIST SYS: 114 CM/S
RIGHT CCA MID DIAS: 25 CM/S
RIGHT CCA MID SYS: 118 CM/S
RIGHT CCA PROX DIAS: 24 CM/S
RIGHT CCA PROX SYS: 113 CM/S
RIGHT ECA DIAS: 20 CM/S
RIGHT ECA SYS: 96 CM/S
RIGHT ICA DIST DIAS: 30 CM/S
RIGHT ICA DIST SYS: 79 CM/S
RIGHT ICA MID DIAS: 36 CM/S
RIGHT ICA MID SYS: 90 CM/S
RIGHT ICA PROX DIAS: 22 CM/S
RIGHT ICA PROX SYS: 66 CM/S
RIGHT VERTEBRAL DIAS: 15 CM/S
RIGHT VERTEBRAL SYS: 50 CM/S

## 2020-06-26 PROCEDURE — 99214 OFFICE O/P EST MOD 30 MIN: CPT | Mod: S$GLB,,, | Performed by: NURSE PRACTITIONER

## 2020-06-26 PROCEDURE — 93880 CV US DOPPLER CAROTID (CUPID ONLY): ICD-10-PCS | Mod: S$GLB,,, | Performed by: INTERNAL MEDICINE

## 2020-06-26 PROCEDURE — 99999 PR PBB SHADOW E&M-EST. PATIENT-LVL IV: ICD-10-PCS | Mod: PBBFAC,,, | Performed by: NURSE PRACTITIONER

## 2020-06-26 PROCEDURE — 93005 EKG 12-LEAD: ICD-10-PCS | Mod: S$GLB,,, | Performed by: NURSE PRACTITIONER

## 2020-06-26 PROCEDURE — 99999 PR PBB SHADOW E&M-EST. PATIENT-LVL IV: CPT | Mod: PBBFAC,,, | Performed by: NURSE PRACTITIONER

## 2020-06-26 PROCEDURE — 93010 ELECTROCARDIOGRAM REPORT: CPT | Mod: S$GLB,,, | Performed by: INTERNAL MEDICINE

## 2020-06-26 PROCEDURE — 93005 ELECTROCARDIOGRAM TRACING: CPT | Mod: S$GLB,,, | Performed by: NURSE PRACTITIONER

## 2020-06-26 PROCEDURE — 93010 EKG 12-LEAD: ICD-10-PCS | Mod: S$GLB,,, | Performed by: INTERNAL MEDICINE

## 2020-06-26 PROCEDURE — 93880 EXTRACRANIAL BILAT STUDY: CPT | Mod: S$GLB,,, | Performed by: INTERNAL MEDICINE

## 2020-06-26 PROCEDURE — 3008F BODY MASS INDEX DOCD: CPT | Mod: CPTII,S$GLB,, | Performed by: NURSE PRACTITIONER

## 2020-06-26 PROCEDURE — 3008F PR BODY MASS INDEX (BMI) DOCUMENTED: ICD-10-PCS | Mod: CPTII,S$GLB,, | Performed by: NURSE PRACTITIONER

## 2020-06-26 PROCEDURE — 99214 PR OFFICE/OUTPT VISIT, EST, LEVL IV, 30-39 MIN: ICD-10-PCS | Mod: S$GLB,,, | Performed by: NURSE PRACTITIONER

## 2020-06-26 RX ORDER — HYDROXYZINE HYDROCHLORIDE 25 MG/1
25 TABLET, FILM COATED ORAL 3 TIMES DAILY PRN
Qty: 30 TABLET | Refills: 0 | Status: SHIPPED | OUTPATIENT
Start: 2020-06-26 | End: 2020-11-17

## 2020-07-17 ENCOUNTER — CLINICAL SUPPORT (OUTPATIENT)
Dept: AUDIOLOGY | Facility: CLINIC | Age: 55
End: 2020-07-17
Payer: COMMERCIAL

## 2020-07-17 ENCOUNTER — OFFICE VISIT (OUTPATIENT)
Dept: OTOLARYNGOLOGY | Facility: CLINIC | Age: 55
End: 2020-07-17
Payer: COMMERCIAL

## 2020-07-17 DIAGNOSIS — H93.13 TINNITUS, BILATERAL: Primary | ICD-10-CM

## 2020-07-17 DIAGNOSIS — R42 DIZZINESS: ICD-10-CM

## 2020-07-17 DIAGNOSIS — K21.9 LARYNGOPHARYNGEAL REFLUX (LPR): ICD-10-CM

## 2020-07-17 DIAGNOSIS — H93.8X2 EAR PRESSURE, LEFT: Primary | ICD-10-CM

## 2020-07-17 PROCEDURE — 92567 TYMPANOMETRY: CPT | Mod: S$GLB,,, | Performed by: AUDIOLOGIST

## 2020-07-17 PROCEDURE — 92557 COMPREHENSIVE HEARING TEST: CPT | Mod: S$GLB,,, | Performed by: AUDIOLOGIST

## 2020-07-17 PROCEDURE — 92557 PR COMPREHENSIVE HEARING TEST: ICD-10-PCS | Mod: S$GLB,,, | Performed by: AUDIOLOGIST

## 2020-07-17 PROCEDURE — 92567 PR TYMPA2METRY: ICD-10-PCS | Mod: S$GLB,,, | Performed by: AUDIOLOGIST

## 2020-07-17 PROCEDURE — 99202 OFFICE O/P NEW SF 15 MIN: CPT | Mod: S$GLB,,, | Performed by: NURSE PRACTITIONER

## 2020-07-17 PROCEDURE — 99999 PR PBB SHADOW E&M-EST. PATIENT-LVL III: ICD-10-PCS | Mod: PBBFAC,,, | Performed by: NURSE PRACTITIONER

## 2020-07-17 PROCEDURE — 99999 PR PBB SHADOW E&M-EST. PATIENT-LVL I: CPT | Mod: PBBFAC,,, | Performed by: AUDIOLOGIST

## 2020-07-17 PROCEDURE — 99999 PR PBB SHADOW E&M-EST. PATIENT-LVL III: CPT | Mod: PBBFAC,,, | Performed by: NURSE PRACTITIONER

## 2020-07-17 PROCEDURE — 99999 PR PBB SHADOW E&M-EST. PATIENT-LVL I: ICD-10-PCS | Mod: PBBFAC,,, | Performed by: AUDIOLOGIST

## 2020-07-17 PROCEDURE — 99202 PR OFFICE/OUTPT VISIT, NEW, LEVL II, 15-29 MIN: ICD-10-PCS | Mod: S$GLB,,, | Performed by: NURSE PRACTITIONER

## 2020-07-17 RX ORDER — OMEPRAZOLE 40 MG/1
40 CAPSULE, DELAYED RELEASE ORAL EVERY MORNING
Qty: 60 CAPSULE | Refills: 0 | Status: SHIPPED | OUTPATIENT
Start: 2020-07-17 | End: 2020-09-11 | Stop reason: SDUPTHER

## 2020-07-17 NOTE — PROGRESS NOTES
Gee Montez was seen today for a hearing evaluation. Mr. Montez report bilateral tinnitus and dizziness lasting for more than a month.     Pure tone audiometry revealed essentially normal hearing, AU  SRT and PTA are in good agreement bilaterally.  Excellent speech discrimination for the right ear: Excellent for the left ear   Tympanometry revealed Type A for the right ear, Type A for the left ear    Recommendations:  1. Otologic Evaluation  2. Repeat audiogram as needed  3. Hearing Protection

## 2020-07-17 NOTE — PROGRESS NOTES
Subjective:      Gee Montez is a 54 y.o. male who was self-referred for aural fullness.    Mr. Montez reports left ear pressure with intermittent high pitch pressure sounds that occurs over the past few weeks. He states he is constantly clearing his throat. He state he wakes up with lots of mucous in his throat each morning. He denies heartburn, but states he does have significant belching. He denies runny nose, itchy nose, sneezing or post-nasal drip. He denies fever or sinus pressure.  There is not a family history of hearing loss at a young age.  He has a history of tonsillectomy. There is not a prior history of ear or sinus surgery.  There is not a prior history of ear infections .  He denies a history of significant noise exposure.  He does not wear hearing aids currently.  He has not had a hearing test recently.      Past Medical History  He has a past medical history of Chronic neck pain, Frequent PVCs, and Hyperlipidemia.    Past Surgical History  He has a past surgical history that includes Colonoscopy (N/A, 3/20/2020).    Family History  His family history includes Cancer (age of onset: 64) in his paternal grandfather; Diabetes in his mother; Heart disease in his mother; Stroke in his father and paternal grandmother; Ulcers in his father and mother.    Social History  He reports that he has quit smoking. He does not have any smokeless tobacco history on file. He reports current alcohol use.    Allergies  He is allergic to adhes. band-tape-benzalkonium; adhesive tape-silicones; aspirin; and benadryl itch relief.    Medications  He has a current medication list which includes the following prescription(s): atorvastatin, bystolic, hydroxyzine hcl, and omeprazole.    Review of Systems   Constitutional: Negative for chills, fatigue and fever.   HENT: Positive for tinnitus. Negative for congestion, facial swelling, nosebleeds, postnasal drip, rhinorrhea, sinus pressure, sinus pain, sneezing, sore throat,  trouble swallowing and voice change.    Eyes: Negative for photophobia, redness, itching and visual disturbance.   Respiratory: Negative for apnea, cough, shortness of breath, wheezing and stridor.    Cardiovascular: Negative for chest pain and palpitations.   Gastrointestinal: Negative for diarrhea, nausea and vomiting.   Endocrine: Negative.    Genitourinary: Positive for frequency. Negative for decreased urine volume and dysuria.   Musculoskeletal: Positive for arthralgias, back pain and neck pain. Negative for myalgias and neck stiffness.   Skin: Negative for rash and wound.   Allergic/Immunologic: Negative for environmental allergies, food allergies and immunocompromised state.   Neurological: Positive for dizziness. Negative for syncope, weakness, light-headedness and headaches.   Hematological: Negative for adenopathy. Does not bruise/bleed easily.   Psychiatric/Behavioral: Positive for sleep disturbance. Negative for confusion and decreased concentration.          Objective:     There were no vitals taken for this visit.     Constitutional:   He is oriented to person, place, and time. Vital signs are normal. He appears well-developed and well-nourished. He appears alert. Normal speech.      Head:  Normocephalic and atraumatic.     Ears:    Right Ear: No lacerations. No drainage, swelling or tenderness. No foreign bodies. No mastoid tenderness. Tympanic membrane is not injected, not scarred, not perforated, not erythematous, not retracted and not bulging. Tympanic membrane mobility is normal. No middle ear effusion. No hemotympanum.   Left Ear: No lacerations. No drainage, swelling or tenderness. No foreign bodies. No mastoid tenderness. Tympanic membrane is not injected, not scarred, not perforated, not erythematous, not retracted and not bulging. Tympanic membrane mobility is normal.  No middle ear effusion. No hemotympanum.     Nose:  No mucosal edema, rhinorrhea, nose lacerations, sinus tenderness, septal  deviation, nasal septal hematoma or polyps. No epistaxis.  No foreign bodies. No turbinate hypertrophy.  Right sinus exhibits no maxillary sinus tenderness and no frontal sinus tenderness. Left sinus exhibits no maxillary sinus tenderness and no frontal sinus tenderness.     Mouth/Throat  Oropharynx clear and moist without lesions or asymmetry, normal uvula midline and lips, teeth, and gums normal. No uvula swelling, oral lesions, trismus, mucous membrane lesions or xerostomia. No oropharyngeal exudate or posterior oropharyngeal erythema.   Tonsils absent      Neck:  Neck normal without thyromegaly masses, asymmetry, normal tracheal structure, crepitus, and tenderness and no adenopathy.     Psychiatric:   He has a normal mood and affect. His speech is normal and behavior is normal.     Neurological:   He is alert and oriented to person, place, and time. No cranial nerve deficit.     Skin:   No abrasions, lacerations, lesions, or rashes.       Procedure    None      Data Reviewed    WBC (K/uL)   Date Value   05/19/2020 5.06     Platelets (K/uL)   Date Value   05/19/2020 196      Creatinine (mg/dL)   Date Value   06/26/2020 1.0     TSH (uIU/mL)   Date Value   06/26/2020 1.572     Glucose (mg/dL)   Date Value   06/26/2020 90     Hemoglobin A1C (%)   Date Value   05/19/2020 6.1 (H)       I independently reviewed the tracings of the complete audiometric evaluation performed today.  I reviewed the audiogram with the patient as well.  Pertinent findings include mild bilateral hearing loss at 4000Hz, otherwise normal audiometric testing..         Assessment:     1. Ear pressure, left    2. Laryngopharyngeal reflux (LPR)    3. Dizziness         Plan:     I had a long discussion with the patient regarding his condition and the further workup and management options.    Gee was seen today for dizziness, sinus problem, sinusitis and tinnitus.    Diagnoses and all orders for this visit:    Ear pressure, left  -     Ambulatory  referral/consult to Audiology; Future    Laryngopharyngeal reflux (LPR)  -     omeprazole (PRILOSEC) 40 MG capsule; Take 1 capsule (40 mg total) by mouth every morning.  -     If not improved, will plan for laryngoscopy.    Dizziness    - I recommend [place] exercises 2 times daily for 2 weeks.   - consider VNG if not improved.      Follow up in about 6 weeks (around 8/28/2020).

## 2020-08-13 ENCOUNTER — PATIENT MESSAGE (OUTPATIENT)
Dept: INTERNAL MEDICINE | Facility: CLINIC | Age: 55
End: 2020-08-13

## 2020-08-14 ENCOUNTER — PATIENT MESSAGE (OUTPATIENT)
Dept: INTERNAL MEDICINE | Facility: CLINIC | Age: 55
End: 2020-08-14

## 2020-08-28 ENCOUNTER — OFFICE VISIT (OUTPATIENT)
Dept: OTOLARYNGOLOGY | Facility: CLINIC | Age: 55
End: 2020-08-28
Payer: COMMERCIAL

## 2020-08-28 DIAGNOSIS — H93.8X2 EAR PRESSURE, LEFT: ICD-10-CM

## 2020-08-28 DIAGNOSIS — R42 DIZZINESS: ICD-10-CM

## 2020-08-28 DIAGNOSIS — K21.9 LARYNGOPHARYNGEAL REFLUX (LPR): Primary | ICD-10-CM

## 2020-08-28 PROCEDURE — 99213 OFFICE O/P EST LOW 20 MIN: CPT | Mod: S$GLB,,, | Performed by: NURSE PRACTITIONER

## 2020-08-28 PROCEDURE — 99213 PR OFFICE/OUTPT VISIT, EST, LEVL III, 20-29 MIN: ICD-10-PCS | Mod: S$GLB,,, | Performed by: NURSE PRACTITIONER

## 2020-08-28 PROCEDURE — 99999 PR PBB SHADOW E&M-EST. PATIENT-LVL II: ICD-10-PCS | Mod: PBBFAC,,, | Performed by: NURSE PRACTITIONER

## 2020-08-28 PROCEDURE — 99999 PR PBB SHADOW E&M-EST. PATIENT-LVL II: CPT | Mod: PBBFAC,,, | Performed by: NURSE PRACTITIONER

## 2020-08-28 NOTE — PROGRESS NOTES
Subjective:      Gee Montez is a 55 y.o. male who is here for follow up for LPR. He reports improvement of reflux and states he is not belching as much as before since starting the omeprazole. He states he is not clearing his throat as often, but he does continue with mucous in his throat. He denies any nasal or sinus symptoms. He  intermittent ear pressure. He reports his dizziness has greatly improved. He feels his anxiety may be part of the cause for his dizziness. He states he has been exercising daily (rowing), loss 15 pounds over the past 2 months.     HPI:  Gee Montez is a 54 y.o. male who was self-referred for aural fullness. Mr. Montez reports left ear pressure with intermittent high pitch pressure sounds that occurs over the past few weeks. He states he is constantly clearing his throat. He state he wakes up with lots of mucous in his throat each morning. He denies heartburn, but states he does have significant belching. He denies runny nose, itchy nose, sneezing or post-nasal drip. He denies fever or sinus pressure.  There is not a family history of hearing loss at a young age.  He has a history of tonsillectomy. There is not a prior history of ear or sinus surgery.  There is not a prior history of ear infections .  He denies a history of significant noise exposure.  He does not wear hearing aids currently.  He has not had a hearing test recently.          Past Medical History  He has a past medical history of Chronic neck pain, Frequent PVCs, and Hyperlipidemia.    Past Surgical History  He has a past surgical history that includes Colonoscopy (N/A, 3/20/2020).    Family History  His family history includes Cancer (age of onset: 64) in his paternal grandfather; Diabetes in his mother; Heart disease in his mother; Stroke in his father and paternal grandmother; Ulcers in his father and mother.    Social History  He reports that he has quit smoking. He does not have any smokeless tobacco  history on file. He reports current alcohol use.    Allergies  He is allergic to adhes. band-tape-benzalkonium; adhesive tape-silicones; aspirin; and benadryl itch relief.    Medications   He has a current medication list which includes the following prescription(s): atorvastatin, bystolic, hydroxyzine hcl, and omeprazole.      Review of Systems     Constitutional: Negative for appetite change, chills, fatigue, fever and unexpected weight loss.      HENT: Positive for ringing in the ears.  Negative for ear pain, hearing loss, postnasal drip, runny nose, sinus pressure, trouble swallowing and voice change.      Respiratory:  Negative for cough and shortness of breath.      Cardiovascular:  Negative for chest pain and foot swelling.     Gastrointestinal:  Positive for acid reflux and heartburn. Negative for abdominal pain, diarrhea and vomiting.     Musc: Negative for aching joints, aching muscles, back pain and neck pain.     Skin: Negative for rash.     Allergy: Negative for food allergies and seasonal allergies.     Neurological: Positive for dizziness.                Objective:     There were no vitals taken for this visit.       Constitutional:   He is oriented to person, place, and time. Vital signs are normal. He appears well-developed and well-nourished. He appears alert. Normal speech.      Head:  Normocephalic and atraumatic.     Ears:    Right Ear: No lacerations. No drainage, swelling or tenderness. No foreign bodies. No mastoid tenderness. Tympanic membrane is not injected, not scarred, not perforated, not erythematous, not retracted and not bulging. Tympanic membrane mobility is normal. No middle ear effusion. No hemotympanum.   Left Ear: No lacerations. No drainage, swelling or tenderness. No foreign bodies. No mastoid tenderness. Tympanic membrane is not injected, not scarred, not perforated, not erythematous, not retracted and not bulging. Tympanic membrane mobility is normal.  No middle ear effusion.  No hemotympanum.     Nose:  No mucosal edema, rhinorrhea, nose lacerations, sinus tenderness, septal deviation, nasal septal hematoma or polyps. No epistaxis.  No foreign bodies. No turbinate hypertrophy.  Right sinus exhibits no maxillary sinus tenderness and no frontal sinus tenderness. Left sinus exhibits no maxillary sinus tenderness and no frontal sinus tenderness.     Mouth/Throat  Oropharynx clear and moist without lesions or asymmetry, normal uvula midline and lips, teeth, and gums normal. No uvula swelling, oral lesions, trismus, mucous membrane lesions or xerostomia. No oropharyngeal exudate or posterior oropharyngeal erythema.   Tonsils absent      Neck:  Neck normal without thyromegaly masses, asymmetry, normal tracheal structure, crepitus, and tenderness and no adenopathy.     Psychiatric:   He has a normal mood and affect. His speech is normal and behavior is normal.     Neurological:   He is alert and oriented to person, place, and time. No cranial nerve deficit.     Skin:   No abrasions, lacerations, lesions, or rashes.       Procedure    None      Data Reviewed    WBC (K/uL)   Date Value   05/19/2020 5.06     Eosinophil% (%)   Date Value   05/19/2020 4.2     Eos # (K/uL)   Date Value   05/19/2020 0.2     Platelets (K/uL)   Date Value   05/19/2020 196     Glucose (mg/dL)   Date Value   06/26/2020 90     No results found for: IGE    No sinus imaging available.       Assessment:     1. Laryngopharyngeal reflux (LPR)    2. Dizziness    3. Ear pressure, left         Plan:     I had a long discussion with the patient regarding his condition and the further workup and management options.    Continue Omeprazole 40mg daily for 2 more months, then will taper down.  If reflux not improved, will consider endoscopy and referral to GI.  Continue Vestibular exercises as needed for Dizziness.  I recommend fluticasone 1-2 sprays each nostril once daily as needed for ear pressure.    Follow up in about 2 months  (around 10/28/2020).

## 2020-09-02 ENCOUNTER — PATIENT MESSAGE (OUTPATIENT)
Dept: INTERNAL MEDICINE | Facility: CLINIC | Age: 55
End: 2020-09-02

## 2020-09-03 ENCOUNTER — PATIENT MESSAGE (OUTPATIENT)
Dept: INTERNAL MEDICINE | Facility: CLINIC | Age: 55
End: 2020-09-03

## 2020-09-11 DIAGNOSIS — K21.9 LARYNGOPHARYNGEAL REFLUX (LPR): ICD-10-CM

## 2020-09-11 RX ORDER — OMEPRAZOLE 40 MG/1
40 CAPSULE, DELAYED RELEASE ORAL EVERY MORNING
Qty: 60 CAPSULE | Refills: 0 | Status: CANCELLED | OUTPATIENT
Start: 2020-09-11 | End: 2020-11-10

## 2020-09-14 RX ORDER — OMEPRAZOLE 40 MG/1
40 CAPSULE, DELAYED RELEASE ORAL EVERY MORNING
Qty: 60 CAPSULE | Refills: 0 | Status: SHIPPED | OUTPATIENT
Start: 2020-09-14 | End: 2020-11-17

## 2020-10-26 ENCOUNTER — PATIENT MESSAGE (OUTPATIENT)
Dept: INTERNAL MEDICINE | Facility: CLINIC | Age: 55
End: 2020-10-26

## 2020-11-10 ENCOUNTER — LAB VISIT (OUTPATIENT)
Dept: LAB | Facility: HOSPITAL | Age: 55
End: 2020-11-10
Attending: INTERNAL MEDICINE
Payer: COMMERCIAL

## 2020-11-10 DIAGNOSIS — E78.2 MIXED HYPERLIPIDEMIA: ICD-10-CM

## 2020-11-10 DIAGNOSIS — R73.03 PREDIABETES: ICD-10-CM

## 2020-11-10 LAB
ALBUMIN SERPL BCP-MCNC: 4.2 G/DL (ref 3.5–5.2)
ALP SERPL-CCNC: 50 U/L (ref 55–135)
ALT SERPL W/O P-5'-P-CCNC: 25 U/L (ref 10–44)
ANION GAP SERPL CALC-SCNC: 7 MMOL/L (ref 8–16)
AST SERPL-CCNC: 24 U/L (ref 10–40)
BILIRUB SERPL-MCNC: 0.7 MG/DL (ref 0.1–1)
BUN SERPL-MCNC: 17 MG/DL (ref 6–20)
CALCIUM SERPL-MCNC: 10 MG/DL (ref 8.7–10.5)
CHLORIDE SERPL-SCNC: 106 MMOL/L (ref 95–110)
CHOLEST SERPL-MCNC: 160 MG/DL (ref 120–199)
CHOLEST/HDLC SERPL: 2.5 {RATIO} (ref 2–5)
CO2 SERPL-SCNC: 31 MMOL/L (ref 23–29)
CREAT SERPL-MCNC: 1 MG/DL (ref 0.5–1.4)
EST. GFR  (AFRICAN AMERICAN): >60 ML/MIN/1.73 M^2
EST. GFR  (NON AFRICAN AMERICAN): >60 ML/MIN/1.73 M^2
ESTIMATED AVG GLUCOSE: 111 MG/DL (ref 68–131)
GLUCOSE SERPL-MCNC: 105 MG/DL (ref 70–110)
HBA1C MFR BLD HPLC: 5.5 % (ref 4–5.6)
HDLC SERPL-MCNC: 63 MG/DL (ref 40–75)
HDLC SERPL: 39.4 % (ref 20–50)
LDLC SERPL CALC-MCNC: 78 MG/DL (ref 63–159)
NONHDLC SERPL-MCNC: 97 MG/DL
POTASSIUM SERPL-SCNC: 5 MMOL/L (ref 3.5–5.1)
PROT SERPL-MCNC: 7.3 G/DL (ref 6–8.4)
SODIUM SERPL-SCNC: 144 MMOL/L (ref 136–145)
TRIGL SERPL-MCNC: 95 MG/DL (ref 30–150)

## 2020-11-10 PROCEDURE — 36415 COLL VENOUS BLD VENIPUNCTURE: CPT

## 2020-11-10 PROCEDURE — 83036 HEMOGLOBIN GLYCOSYLATED A1C: CPT

## 2020-11-10 PROCEDURE — 80053 COMPREHEN METABOLIC PANEL: CPT

## 2020-11-10 PROCEDURE — 80061 LIPID PANEL: CPT

## 2020-11-17 ENCOUNTER — OFFICE VISIT (OUTPATIENT)
Dept: INTERNAL MEDICINE | Facility: CLINIC | Age: 55
End: 2020-11-17
Payer: COMMERCIAL

## 2020-11-17 VITALS
HEIGHT: 70 IN | HEART RATE: 54 BPM | BODY MASS INDEX: 29.57 KG/M2 | SYSTOLIC BLOOD PRESSURE: 120 MMHG | DIASTOLIC BLOOD PRESSURE: 74 MMHG | WEIGHT: 206.56 LBS | OXYGEN SATURATION: 99 %

## 2020-11-17 DIAGNOSIS — E78.2 MIXED HYPERLIPIDEMIA: ICD-10-CM

## 2020-11-17 DIAGNOSIS — Z12.5 SCREENING PSA (PROSTATE SPECIFIC ANTIGEN): ICD-10-CM

## 2020-11-17 DIAGNOSIS — R42 EPISODIC LIGHTHEADEDNESS: ICD-10-CM

## 2020-11-17 DIAGNOSIS — R73.03 PREDIABETES: Primary | ICD-10-CM

## 2020-11-17 DIAGNOSIS — Z13.0 SCREENING, ANEMIA, DEFICIENCY, IRON: ICD-10-CM

## 2020-11-17 DIAGNOSIS — M54.50 CHRONIC BILATERAL LOW BACK PAIN WITHOUT SCIATICA: ICD-10-CM

## 2020-11-17 DIAGNOSIS — G89.29 CHRONIC BILATERAL LOW BACK PAIN WITHOUT SCIATICA: ICD-10-CM

## 2020-11-17 DIAGNOSIS — I49.3 FREQUENT PVCS: ICD-10-CM

## 2020-11-17 PROCEDURE — 99214 PR OFFICE/OUTPT VISIT, EST, LEVL IV, 30-39 MIN: ICD-10-PCS | Mod: S$GLB,,, | Performed by: INTERNAL MEDICINE

## 2020-11-17 PROCEDURE — 99999 PR PBB SHADOW E&M-EST. PATIENT-LVL IV: ICD-10-PCS | Mod: PBBFAC,,, | Performed by: INTERNAL MEDICINE

## 2020-11-17 PROCEDURE — 3008F BODY MASS INDEX DOCD: CPT | Mod: CPTII,S$GLB,, | Performed by: INTERNAL MEDICINE

## 2020-11-17 PROCEDURE — 99214 OFFICE O/P EST MOD 30 MIN: CPT | Mod: S$GLB,,, | Performed by: INTERNAL MEDICINE

## 2020-11-17 PROCEDURE — 1126F AMNT PAIN NOTED NONE PRSNT: CPT | Mod: S$GLB,,, | Performed by: INTERNAL MEDICINE

## 2020-11-17 PROCEDURE — 99999 PR PBB SHADOW E&M-EST. PATIENT-LVL IV: CPT | Mod: PBBFAC,,, | Performed by: INTERNAL MEDICINE

## 2020-11-17 PROCEDURE — 3008F PR BODY MASS INDEX (BMI) DOCUMENTED: ICD-10-PCS | Mod: CPTII,S$GLB,, | Performed by: INTERNAL MEDICINE

## 2020-11-17 PROCEDURE — 1126F PR PAIN SEVERITY QUANTIFIED, NO PAIN PRESENT: ICD-10-PCS | Mod: S$GLB,,, | Performed by: INTERNAL MEDICINE

## 2020-11-17 RX ORDER — TRAMADOL HYDROCHLORIDE 50 MG/1
50 TABLET ORAL NIGHTLY PRN
Qty: 30 TABLET | Refills: 1 | Status: SHIPPED | OUTPATIENT
Start: 2020-11-17 | End: 2021-03-31 | Stop reason: SDUPTHER

## 2020-11-17 NOTE — PATIENT INSTRUCTIONS
Decrease bystolic to every other day for 2 weeks and if no worsening PVC's, then you can stop it completely.    If still feeling well, you can stop Lipitor after that.    Gas-Ex for belching.

## 2020-11-17 NOTE — PROGRESS NOTES
INTERNAL MEDICINE ESTABLISHED PATIENT VISIT NOTE    Subjective:     Chief Complaint: Follow-up  HLD, preDM     Patient ID: Gee Montez is a 55 y.o. male with HLD, preDM, hx PVCs on bystolic, chronic neck and back pain, last seen by me in May to [facility], here today for f/u HLD.    Since last visit was seen by ENT for sx of dizziness, was started on Omeprazole for reflux and rec vestibular exercises which he states he did not do.    Has also cut back on caffeine intake which he thinks has helped c freq of PVCs.  Still admits he does not always drink enough water.    Still has a lot of belching despite cutting back on drinking alcohol and adjusting his diet.  States he took PPI for 4 mos which did not really help so stopped taking it.    Still c occasional dizziness which he feels is stress related. 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    Also recently had some issues c his back after doing more work at home on the house and had some back pain and took some old Tramadol which helped.  Thinks it also made his mood better.    At last visit, had labs done which showed preDM.  States he has adjusted his diet and lost about 35 lbs since last visit.  Also rowing at home and feeling better overall.    Past Medical History:  Past Medical History:   Diagnosis Date    Chronic neck pain     Frequent PVCs     Hyperlipidemia        Home Medications:  Prior to Admission medications    Medication Sig Start Date End Date Taking? Authorizing Provider   atorvastatin (LIPITOR) 10 MG tablet Take 1 tablet (10 mg total) by mouth once daily. 11/5/19  Yes Omar Arredondo MD   BYSTOLIC 5 mg Tab Take 1 tablet (5 mg total) by mouth every evening. 11/5/19  Yes Omar Arredondo MD   hydrOXYzine HCL (ATARAX) 25 MG tablet Take 1 tablet (25 mg total) by mouth 3 (three) times daily as needed.  Patient not taking: Reported on 11/17/2020 6/26/20    "Celestina Merchant, FN   omeprazole (PRILOSEC) 40 MG capsule Take 1 capsule (40 mg total) by mouth every morning.  Patient not taking: Reported on 11/17/2020 9/14/20 11/17/20  Leo Bowling NP       Allergies:  Review of patient's allergies indicates:   Allergen Reactions    Adhes. band-tape-benzalkonium     Adhesive tape-silicones     Aspirin      Hurts stomach    Benadryl itch relief Nausea Only and Palpitations       Social History:  Social History     Tobacco Use    Smoking status: Former Smoker   Substance Use Topics    Alcohol use: Yes     Frequency: 4 or more times a week     Drinks per session: 1 or 2     Binge frequency: Never     Comment: 2-3 drinks on weekend days, 5 drinks per week    Drug use: Not on file        Review of Systems   Constitutional: Negative for chills, fatigue and fever.   Respiratory: Negative for cough, chest tightness and shortness of breath.    Cardiovascular: Negative for chest pain.   Gastrointestinal: Negative for abdominal pain and blood in stool.   Genitourinary: Negative for dysuria and frequency.         Health Maintenance:     Immunizations:   Influenza 10/2020  TDap 7/2017  Prevnar 13 rec at 64  Shingrix rec today, pt given rx card and states he will think about it.     Cancer Screening:  Colonoscopy:  3/2020 c hemorrhoids s/p banding, no polyps, rec repeat in 10 yrs  PSA 5/2020 wnl    Objective:   /74 (BP Location: Right arm, Patient Position: Sitting, BP Method: Large (Manual))   Pulse (!) 54   Ht 5' 10" (1.778 m)   Wt 93.7 kg (206 lb 9.1 oz)   SpO2 99%   BMI 29.64 kg/m²        General: AAO x3, no apparent distress  HEENT: PERRL, OP clear  CV: RRR, no m/r/g  Pulm: Lungs CTAB, no crackles, no wheezes  Abd: s/NT/ND +BS  Extremities: no c/c/e    Labs:     Lab Results   Component Value Date    WBC 5.06 05/19/2020    HGB 16.1 05/19/2020    HCT 51.6 05/19/2020    MCV 95 05/19/2020     05/19/2020     Sodium   Date Value Ref Range Status "   11/10/2020 144 136 - 145 mmol/L Final     Potassium   Date Value Ref Range Status   11/10/2020 5.0 3.5 - 5.1 mmol/L Final     Chloride   Date Value Ref Range Status   11/10/2020 106 95 - 110 mmol/L Final     CO2   Date Value Ref Range Status   11/10/2020 31 (H) 23 - 29 mmol/L Final     Glucose   Date Value Ref Range Status   11/10/2020 105 70 - 110 mg/dL Final     BUN   Date Value Ref Range Status   11/10/2020 17 6 - 20 mg/dL Final     Creatinine   Date Value Ref Range Status   11/10/2020 1.0 0.5 - 1.4 mg/dL Final     Calcium   Date Value Ref Range Status   11/10/2020 10.0 8.7 - 10.5 mg/dL Final     Total Protein   Date Value Ref Range Status   11/10/2020 7.3 6.0 - 8.4 g/dL Final     Albumin   Date Value Ref Range Status   11/10/2020 4.2 3.5 - 5.2 g/dL Final     Total Bilirubin   Date Value Ref Range Status   11/10/2020 0.7 0.1 - 1.0 mg/dL Final     Comment:     For infants and newborns, interpretation of results should be based  on gestational age, weight and in agreement with clinical  observations.  Premature Infant recommended reference ranges:  Up to 24 hours.............<8.0 mg/dL  Up to 48 hours............<12.0 mg/dL  3-5 days..................<15.0 mg/dL  6-29 days.................<15.0 mg/dL       Alkaline Phosphatase   Date Value Ref Range Status   11/10/2020 50 (L) 55 - 135 U/L Final     AST   Date Value Ref Range Status   11/10/2020 24 10 - 40 U/L Final     ALT   Date Value Ref Range Status   11/10/2020 25 10 - 44 U/L Final     Anion Gap   Date Value Ref Range Status   11/10/2020 7 (L) 8 - 16 mmol/L Final     eGFR if    Date Value Ref Range Status   11/10/2020 >60.0 >60 mL/min/1.73 m^2 Final     eGFR if non    Date Value Ref Range Status   11/10/2020 >60.0 >60 mL/min/1.73 m^2 Final     Comment:     Calculation used to obtain the estimated glomerular filtration  rate (eGFR) is the CKD-EPI equation.        Lab Results   Component Value Date    HGBA1C 5.5 11/10/2020      Lab Results   Component Value Date    LDLCALC 78.0 11/10/2020     Lab Results   Component Value Date    TSH 1.572 06/26/2020         Assessment/Plan     Gee was seen today for follow-up.    Diagnoses and all orders for this visit:    Prediabetes  Lab Results   Component Value Date    HGBA1C 5.5 11/10/2020     Resolved c diet and wt loss.  Pt commended, cont lifestyle modifications.    Chronic bilateral low back pain without sciatica  As per HPI  Discussed prn use only, 30 pills to last 3 mos, one refill to last until his 6 mo f/u  -     traMADoL (ULTRAM) 50 mg tablet; Take 1 tablet (50 mg total) by mouth nightly as needed for Pain.    Episodic lightheadedness  As per HPI  Will see if stopping bystolic will help (based on current bp and HR should be able to tolerate, also cut back on caffeine which should help c PVCs)  Also advised to increase hydration.  Also advised to complete vestibular exercises as rec by ENT  Consider tx of anxiety if sx fail to resolve.   -     Comprehensive Metabolic Panel; Future  -     CBC Auto Differential; Future    Frequent PVCs  As above  Better since cutting back on caffeine  Increase hydration  Will see if pt tolerates stopping bystolic since bp and HR lower now.    Mixed hyperlipidemia  Lab Results   Component Value Date    LDLCALC 78.0 11/10/2020     Much better on recent labs.  Has only been taking Lipitor 10 every other day.  Will see if pt tolerates stopping meds (pt wants to try to get off), repeat labs prior to f/u, goal LDL <130, fam hx CAD  -     Lipid Panel; Future    Screening, anemia, deficiency, iron  -     CBC Auto Differential; Future    Screening PSA (prostate specific antigen)  -     PSA, Screening; Future    HM as above  RTC in 6 mos c fasting labs prior, sooner if needed.    Celina Myles MD  Department of Internal Medicine - [facility]  11/17/2020

## 2021-01-14 ENCOUNTER — PATIENT MESSAGE (OUTPATIENT)
Dept: INTERNAL MEDICINE | Facility: CLINIC | Age: 56
End: 2021-01-14

## 2021-03-11 ENCOUNTER — PATIENT MESSAGE (OUTPATIENT)
Dept: INTERNAL MEDICINE | Facility: CLINIC | Age: 56
End: 2021-03-11

## 2021-03-12 ENCOUNTER — IMMUNIZATION (OUTPATIENT)
Dept: PRIMARY CARE CLINIC | Facility: CLINIC | Age: 56
End: 2021-03-12

## 2021-03-12 DIAGNOSIS — Z23 NEED FOR VACCINATION: Primary | ICD-10-CM

## 2021-03-12 PROCEDURE — 0001A PR IMMUNIZ ADMIN, SARS-COV-2 COVID-19 VACC, 30MCG/0.3ML, 1ST DOSE: ICD-10-PCS | Mod: CV19,S$GLB,, | Performed by: INTERNAL MEDICINE

## 2021-03-12 PROCEDURE — 91300 PR SARS-COV- 2 COVID-19 VACCINE, NO PRSV, 30MCG/0.3ML, IM: ICD-10-PCS | Mod: S$GLB,,, | Performed by: INTERNAL MEDICINE

## 2021-03-12 PROCEDURE — 0001A PR IMMUNIZ ADMIN, SARS-COV-2 COVID-19 VACC, 30MCG/0.3ML, 1ST DOSE: CPT | Mod: CV19,S$GLB,, | Performed by: INTERNAL MEDICINE

## 2021-03-12 PROCEDURE — 91300 PR SARS-COV- 2 COVID-19 VACCINE, NO PRSV, 30MCG/0.3ML, IM: CPT | Mod: S$GLB,,, | Performed by: INTERNAL MEDICINE

## 2021-03-12 RX ADMIN — Medication 0.3 ML: at 04:03

## 2021-03-30 ENCOUNTER — PATIENT MESSAGE (OUTPATIENT)
Dept: INTERNAL MEDICINE | Facility: CLINIC | Age: 56
End: 2021-03-30

## 2021-03-31 DIAGNOSIS — G89.29 CHRONIC BILATERAL LOW BACK PAIN WITHOUT SCIATICA: ICD-10-CM

## 2021-03-31 DIAGNOSIS — M54.50 CHRONIC BILATERAL LOW BACK PAIN WITHOUT SCIATICA: ICD-10-CM

## 2021-03-31 RX ORDER — TRAMADOL HYDROCHLORIDE 50 MG/1
50 TABLET ORAL NIGHTLY PRN
Qty: 30 TABLET | Refills: 1 | Status: SHIPPED | OUTPATIENT
Start: 2021-03-31 | End: 2021-10-13 | Stop reason: SDUPTHER

## 2021-04-02 ENCOUNTER — IMMUNIZATION (OUTPATIENT)
Dept: PRIMARY CARE CLINIC | Facility: CLINIC | Age: 56
End: 2021-04-02
Payer: COMMERCIAL

## 2021-04-02 DIAGNOSIS — Z23 NEED FOR VACCINATION: Primary | ICD-10-CM

## 2021-04-02 PROCEDURE — 91300 PR SARS-COV- 2 COVID-19 VACCINE, NO PRSV, 30MCG/0.3ML, IM: ICD-10-PCS | Mod: S$GLB,,, | Performed by: INTERNAL MEDICINE

## 2021-04-02 PROCEDURE — 0002A PR IMMUNIZ ADMIN, SARS-COV-2 COVID-19 VACC, 30MCG/0.3ML, 2ND DOSE: ICD-10-PCS | Mod: CV19,S$GLB,, | Performed by: INTERNAL MEDICINE

## 2021-04-02 PROCEDURE — 0002A PR IMMUNIZ ADMIN, SARS-COV-2 COVID-19 VACC, 30MCG/0.3ML, 2ND DOSE: CPT | Mod: CV19,S$GLB,, | Performed by: INTERNAL MEDICINE

## 2021-04-02 PROCEDURE — 91300 PR SARS-COV- 2 COVID-19 VACCINE, NO PRSV, 30MCG/0.3ML, IM: CPT | Mod: S$GLB,,, | Performed by: INTERNAL MEDICINE

## 2021-04-02 RX ADMIN — Medication 0.3 ML: at 11:04

## 2021-04-09 ENCOUNTER — OFFICE VISIT (OUTPATIENT)
Dept: OPTOMETRY | Facility: CLINIC | Age: 56
End: 2021-04-09
Payer: COMMERCIAL

## 2021-04-09 DIAGNOSIS — H35.413 LATTICE DEGENERATION OF BOTH RETINAS: ICD-10-CM

## 2021-04-09 DIAGNOSIS — H52.12 MYOPIA WITH ASTIGMATISM AND PRESBYOPIA, LEFT: Primary | ICD-10-CM

## 2021-04-09 DIAGNOSIS — H52.4 HYPEROPIA OF RIGHT EYE WITH ASTIGMATISM AND PRESBYOPIA: ICD-10-CM

## 2021-04-09 DIAGNOSIS — H25.13 NUCLEAR SCLEROSIS OF BOTH EYES: ICD-10-CM

## 2021-04-09 DIAGNOSIS — H52.4 MYOPIA WITH ASTIGMATISM AND PRESBYOPIA, LEFT: Primary | ICD-10-CM

## 2021-04-09 DIAGNOSIS — H52.202 MYOPIA WITH ASTIGMATISM AND PRESBYOPIA, LEFT: Primary | ICD-10-CM

## 2021-04-09 DIAGNOSIS — H52.201 HYPEROPIA OF RIGHT EYE WITH ASTIGMATISM AND PRESBYOPIA: ICD-10-CM

## 2021-04-09 DIAGNOSIS — H52.01 HYPEROPIA OF RIGHT EYE WITH ASTIGMATISM AND PRESBYOPIA: ICD-10-CM

## 2021-04-09 PROCEDURE — 92015 DETERMINE REFRACTIVE STATE: CPT | Mod: S$GLB,,, | Performed by: OPTOMETRIST

## 2021-04-09 PROCEDURE — 99999 PR PBB SHADOW E&M-EST. PATIENT-LVL II: ICD-10-PCS | Mod: PBBFAC,,, | Performed by: OPTOMETRIST

## 2021-04-09 PROCEDURE — 99999 PR PBB SHADOW E&M-EST. PATIENT-LVL II: CPT | Mod: PBBFAC,,, | Performed by: OPTOMETRIST

## 2021-04-09 PROCEDURE — 92015 PR REFRACTION: ICD-10-PCS | Mod: S$GLB,,, | Performed by: OPTOMETRIST

## 2021-04-09 PROCEDURE — 92014 PR EYE EXAM, EST PATIENT,COMPREHESV: ICD-10-PCS | Mod: S$GLB,,, | Performed by: OPTOMETRIST

## 2021-04-09 PROCEDURE — 92014 COMPRE OPH EXAM EST PT 1/>: CPT | Mod: S$GLB,,, | Performed by: OPTOMETRIST

## 2021-04-16 ENCOUNTER — PATIENT MESSAGE (OUTPATIENT)
Dept: INTERNAL MEDICINE | Facility: CLINIC | Age: 56
End: 2021-04-16

## 2021-04-16 DIAGNOSIS — E55.9 VITAMIN D DEFICIENCY: Primary | ICD-10-CM

## 2021-05-11 ENCOUNTER — LAB VISIT (OUTPATIENT)
Dept: LAB | Facility: HOSPITAL | Age: 56
End: 2021-05-11
Attending: INTERNAL MEDICINE
Payer: COMMERCIAL

## 2021-05-11 DIAGNOSIS — Z13.0 SCREENING, ANEMIA, DEFICIENCY, IRON: ICD-10-CM

## 2021-05-11 DIAGNOSIS — R42 EPISODIC LIGHTHEADEDNESS: ICD-10-CM

## 2021-05-11 DIAGNOSIS — Z12.5 SCREENING PSA (PROSTATE SPECIFIC ANTIGEN): ICD-10-CM

## 2021-05-11 DIAGNOSIS — E78.2 MIXED HYPERLIPIDEMIA: ICD-10-CM

## 2021-05-11 DIAGNOSIS — E55.9 VITAMIN D DEFICIENCY: ICD-10-CM

## 2021-05-11 LAB
25(OH)D3+25(OH)D2 SERPL-MCNC: 34 NG/ML (ref 30–96)
ALBUMIN SERPL BCP-MCNC: 4.1 G/DL (ref 3.5–5.2)
ALP SERPL-CCNC: 54 U/L (ref 55–135)
ALT SERPL W/O P-5'-P-CCNC: 20 U/L (ref 10–44)
ANION GAP SERPL CALC-SCNC: 8 MMOL/L (ref 8–16)
AST SERPL-CCNC: 23 U/L (ref 10–40)
BASOPHILS # BLD AUTO: 0.02 K/UL (ref 0–0.2)
BASOPHILS NFR BLD: 0.5 % (ref 0–1.9)
BILIRUB SERPL-MCNC: 0.5 MG/DL (ref 0.1–1)
BUN SERPL-MCNC: 14 MG/DL (ref 6–20)
CALCIUM SERPL-MCNC: 9.7 MG/DL (ref 8.7–10.5)
CHLORIDE SERPL-SCNC: 104 MMOL/L (ref 95–110)
CHOLEST SERPL-MCNC: 221 MG/DL (ref 120–199)
CHOLEST/HDLC SERPL: 2.5 {RATIO} (ref 2–5)
CO2 SERPL-SCNC: 31 MMOL/L (ref 23–29)
COMPLEXED PSA SERPL-MCNC: 0.53 NG/ML (ref 0–4)
CREAT SERPL-MCNC: 0.9 MG/DL (ref 0.5–1.4)
DIFFERENTIAL METHOD: NORMAL
EOSINOPHIL # BLD AUTO: 0.2 K/UL (ref 0–0.5)
EOSINOPHIL NFR BLD: 5.1 % (ref 0–8)
ERYTHROCYTE [DISTWIDTH] IN BLOOD BY AUTOMATED COUNT: 14.2 % (ref 11.5–14.5)
EST. GFR  (AFRICAN AMERICAN): >60 ML/MIN/1.73 M^2
EST. GFR  (NON AFRICAN AMERICAN): >60 ML/MIN/1.73 M^2
GLUCOSE SERPL-MCNC: 95 MG/DL (ref 70–110)
HCT VFR BLD AUTO: 49.2 % (ref 40–54)
HDLC SERPL-MCNC: 89 MG/DL (ref 40–75)
HDLC SERPL: 40.3 % (ref 20–50)
HGB BLD-MCNC: 16 G/DL (ref 14–18)
IMM GRANULOCYTES # BLD AUTO: 0.01 K/UL (ref 0–0.04)
IMM GRANULOCYTES NFR BLD AUTO: 0.3 % (ref 0–0.5)
LDLC SERPL CALC-MCNC: 121.6 MG/DL (ref 63–159)
LYMPHOCYTES # BLD AUTO: 1.1 K/UL (ref 1–4.8)
LYMPHOCYTES NFR BLD: 27 % (ref 18–48)
MCH RBC QN AUTO: 29.5 PG (ref 27–31)
MCHC RBC AUTO-ENTMCNC: 32.5 G/DL (ref 32–36)
MCV RBC AUTO: 91 FL (ref 82–98)
MONOCYTES # BLD AUTO: 0.5 K/UL (ref 0.3–1)
MONOCYTES NFR BLD: 12.8 % (ref 4–15)
NEUTROPHILS # BLD AUTO: 2.1 K/UL (ref 1.8–7.7)
NEUTROPHILS NFR BLD: 54.3 % (ref 38–73)
NONHDLC SERPL-MCNC: 132 MG/DL
NRBC BLD-RTO: 0 /100 WBC
PLATELET # BLD AUTO: 162 K/UL (ref 150–450)
PMV BLD AUTO: 10.9 FL (ref 9.2–12.9)
POTASSIUM SERPL-SCNC: 4.6 MMOL/L (ref 3.5–5.1)
PROT SERPL-MCNC: 7.2 G/DL (ref 6–8.4)
RBC # BLD AUTO: 5.43 M/UL (ref 4.6–6.2)
SODIUM SERPL-SCNC: 143 MMOL/L (ref 136–145)
TRIGL SERPL-MCNC: 52 MG/DL (ref 30–150)
WBC # BLD AUTO: 3.92 K/UL (ref 3.9–12.7)

## 2021-05-11 PROCEDURE — 80061 LIPID PANEL: CPT | Performed by: INTERNAL MEDICINE

## 2021-05-11 PROCEDURE — 36415 COLL VENOUS BLD VENIPUNCTURE: CPT | Performed by: NURSE PRACTITIONER

## 2021-05-11 PROCEDURE — 82306 VITAMIN D 25 HYDROXY: CPT | Performed by: NURSE PRACTITIONER

## 2021-05-11 PROCEDURE — 84153 ASSAY OF PSA TOTAL: CPT | Performed by: INTERNAL MEDICINE

## 2021-05-11 PROCEDURE — 85025 COMPLETE CBC W/AUTO DIFF WBC: CPT | Performed by: INTERNAL MEDICINE

## 2021-05-11 PROCEDURE — 80053 COMPREHEN METABOLIC PANEL: CPT | Performed by: INTERNAL MEDICINE

## 2021-05-18 ENCOUNTER — TELEPHONE (OUTPATIENT)
Dept: INTERNAL MEDICINE | Facility: CLINIC | Age: 56
End: 2021-05-18

## 2021-05-18 ENCOUNTER — OFFICE VISIT (OUTPATIENT)
Dept: INTERNAL MEDICINE | Facility: CLINIC | Age: 56
End: 2021-05-18
Payer: COMMERCIAL

## 2021-05-18 VITALS
HEIGHT: 70 IN | WEIGHT: 177.25 LBS | BODY MASS INDEX: 25.38 KG/M2 | HEART RATE: 59 BPM | SYSTOLIC BLOOD PRESSURE: 120 MMHG | DIASTOLIC BLOOD PRESSURE: 70 MMHG | OXYGEN SATURATION: 99 %

## 2021-05-18 DIAGNOSIS — R42 EPISODIC LIGHTHEADEDNESS: ICD-10-CM

## 2021-05-18 DIAGNOSIS — E78.2 MIXED HYPERLIPIDEMIA: ICD-10-CM

## 2021-05-18 DIAGNOSIS — G89.29 CHRONIC NECK PAIN: ICD-10-CM

## 2021-05-18 DIAGNOSIS — R73.03 PREDIABETES: ICD-10-CM

## 2021-05-18 DIAGNOSIS — M54.2 CHRONIC NECK PAIN: ICD-10-CM

## 2021-05-18 DIAGNOSIS — R13.10 DYSPHAGIA, UNSPECIFIED TYPE: ICD-10-CM

## 2021-05-18 DIAGNOSIS — G89.29 CHRONIC BILATERAL LOW BACK PAIN WITHOUT SCIATICA: ICD-10-CM

## 2021-05-18 DIAGNOSIS — M54.50 CHRONIC BILATERAL LOW BACK PAIN WITHOUT SCIATICA: ICD-10-CM

## 2021-05-18 DIAGNOSIS — K21.9 GASTROESOPHAGEAL REFLUX DISEASE, UNSPECIFIED WHETHER ESOPHAGITIS PRESENT: ICD-10-CM

## 2021-05-18 DIAGNOSIS — Z00.00 VISIT FOR ANNUAL HEALTH EXAMINATION: Primary | ICD-10-CM

## 2021-05-18 PROCEDURE — 1125F AMNT PAIN NOTED PAIN PRSNT: CPT | Mod: S$GLB,,, | Performed by: INTERNAL MEDICINE

## 2021-05-18 PROCEDURE — 99396 PR PREVENTIVE VISIT,EST,40-64: ICD-10-PCS | Mod: S$GLB,,, | Performed by: INTERNAL MEDICINE

## 2021-05-18 PROCEDURE — 3008F BODY MASS INDEX DOCD: CPT | Mod: CPTII,S$GLB,, | Performed by: INTERNAL MEDICINE

## 2021-05-18 PROCEDURE — 1125F PR PAIN SEVERITY QUANTIFIED, PAIN PRESENT: ICD-10-PCS | Mod: S$GLB,,, | Performed by: INTERNAL MEDICINE

## 2021-05-18 PROCEDURE — 99396 PREV VISIT EST AGE 40-64: CPT | Mod: S$GLB,,, | Performed by: INTERNAL MEDICINE

## 2021-05-18 PROCEDURE — 99999 PR PBB SHADOW E&M-EST. PATIENT-LVL IV: ICD-10-PCS | Mod: PBBFAC,,, | Performed by: INTERNAL MEDICINE

## 2021-05-18 PROCEDURE — 3008F PR BODY MASS INDEX (BMI) DOCUMENTED: ICD-10-PCS | Mod: CPTII,S$GLB,, | Performed by: INTERNAL MEDICINE

## 2021-05-18 PROCEDURE — 99999 PR PBB SHADOW E&M-EST. PATIENT-LVL IV: CPT | Mod: PBBFAC,,, | Performed by: INTERNAL MEDICINE

## 2021-05-18 RX ORDER — FAMOTIDINE 20 MG/1
20 TABLET, FILM COATED ORAL 2 TIMES DAILY
Qty: 60 TABLET | Refills: 2 | Status: SHIPPED | OUTPATIENT
Start: 2021-05-18 | End: 2021-07-07

## 2021-06-06 ENCOUNTER — PATIENT MESSAGE (OUTPATIENT)
Dept: INTERNAL MEDICINE | Facility: CLINIC | Age: 56
End: 2021-06-06

## 2021-06-06 DIAGNOSIS — G47.8 POOR SLEEP PATTERN: Primary | ICD-10-CM

## 2021-06-11 ENCOUNTER — OFFICE VISIT (OUTPATIENT)
Dept: CARDIOLOGY | Facility: CLINIC | Age: 56
End: 2021-06-11
Payer: COMMERCIAL

## 2021-06-11 VITALS
HEIGHT: 70 IN | BODY MASS INDEX: 25.34 KG/M2 | WEIGHT: 177 LBS | SYSTOLIC BLOOD PRESSURE: 133 MMHG | HEART RATE: 63 BPM | DIASTOLIC BLOOD PRESSURE: 79 MMHG

## 2021-06-11 DIAGNOSIS — I10 ESSENTIAL HYPERTENSION: ICD-10-CM

## 2021-06-11 DIAGNOSIS — R00.2 PALPITATIONS: ICD-10-CM

## 2021-06-11 PROCEDURE — 3008F BODY MASS INDEX DOCD: CPT | Mod: CPTII,S$GLB,, | Performed by: INTERNAL MEDICINE

## 2021-06-11 PROCEDURE — 93000 ELECTROCARDIOGRAM COMPLETE: CPT | Mod: S$GLB,,, | Performed by: INTERNAL MEDICINE

## 2021-06-11 PROCEDURE — 99999 PR PBB SHADOW E&M-EST. PATIENT-LVL III: CPT | Mod: PBBFAC,,, | Performed by: INTERNAL MEDICINE

## 2021-06-11 PROCEDURE — 1126F AMNT PAIN NOTED NONE PRSNT: CPT | Mod: S$GLB,,, | Performed by: INTERNAL MEDICINE

## 2021-06-11 PROCEDURE — 93000 EKG 12-LEAD: ICD-10-PCS | Mod: S$GLB,,, | Performed by: INTERNAL MEDICINE

## 2021-06-11 PROCEDURE — 99204 OFFICE O/P NEW MOD 45 MIN: CPT | Mod: S$GLB,,, | Performed by: INTERNAL MEDICINE

## 2021-06-11 PROCEDURE — 99204 PR OFFICE/OUTPT VISIT, NEW, LEVL IV, 45-59 MIN: ICD-10-PCS | Mod: S$GLB,,, | Performed by: INTERNAL MEDICINE

## 2021-06-11 PROCEDURE — 1126F PR PAIN SEVERITY QUANTIFIED, NO PAIN PRESENT: ICD-10-PCS | Mod: S$GLB,,, | Performed by: INTERNAL MEDICINE

## 2021-06-11 PROCEDURE — 3008F PR BODY MASS INDEX (BMI) DOCUMENTED: ICD-10-PCS | Mod: CPTII,S$GLB,, | Performed by: INTERNAL MEDICINE

## 2021-06-11 PROCEDURE — 99999 PR PBB SHADOW E&M-EST. PATIENT-LVL III: ICD-10-PCS | Mod: PBBFAC,,, | Performed by: INTERNAL MEDICINE

## 2021-06-17 ENCOUNTER — HOSPITAL ENCOUNTER (OUTPATIENT)
Dept: CARDIOLOGY | Facility: HOSPITAL | Age: 56
Discharge: HOME | End: 2021-06-17
Attending: INTERNAL MEDICINE
Payer: COMMERCIAL

## 2021-06-17 DIAGNOSIS — R00.2 PALPITATIONS: ICD-10-CM

## 2021-06-17 PROCEDURE — 93227 XTRNL ECG REC<48 HR R&I: CPT | Mod: ,,, | Performed by: INTERNAL MEDICINE

## 2021-06-17 PROCEDURE — 93227 HOLTER MONITOR - 48 HOUR (CUPID ONLY): ICD-10-PCS | Mod: ,,, | Performed by: INTERNAL MEDICINE

## 2021-06-17 PROCEDURE — 93225 XTRNL ECG REC<48 HRS REC: CPT

## 2021-06-18 ENCOUNTER — PATIENT MESSAGE (OUTPATIENT)
Dept: CARDIOLOGY | Facility: CLINIC | Age: 56
End: 2021-06-18

## 2021-06-22 LAB
OHS CV EVENT MONITOR DAY: 0
OHS CV HOLTER LENGTH DECIMAL HOURS: 30.43
OHS CV HOLTER LENGTH HOURS: 30
OHS CV HOLTER LENGTH MINUTES: 26

## 2021-06-24 ENCOUNTER — HOSPITAL ENCOUNTER (OUTPATIENT)
Dept: CARDIOLOGY | Facility: HOSPITAL | Age: 56
Discharge: HOME | End: 2021-06-24
Attending: INTERNAL MEDICINE
Payer: COMMERCIAL

## 2021-06-24 VITALS
WEIGHT: 177 LBS | BODY MASS INDEX: 25.34 KG/M2 | HEIGHT: 70 IN | SYSTOLIC BLOOD PRESSURE: 110 MMHG | HEART RATE: 55 BPM | DIASTOLIC BLOOD PRESSURE: 64 MMHG

## 2021-06-24 DIAGNOSIS — R00.2 PALPITATIONS: ICD-10-CM

## 2021-06-24 DIAGNOSIS — I10 ESSENTIAL HYPERTENSION: ICD-10-CM

## 2021-06-24 LAB
ASCENDING AORTA: 3.02 CM
AV INDEX (PROSTH): 0.78
AV MEAN GRADIENT: 5 MMHG
AV PEAK GRADIENT: 8 MMHG
AV VALVE AREA: 2.9 CM2
AV VELOCITY RATIO: 0.77
BSA FOR ECHO PROCEDURE: 1.99 M2
CV ECHO LV RWT: 0.38 CM
DOP CALC AO PEAK VEL: 1.42 M/S
DOP CALC AO VTI: 32.43 CM
DOP CALC LVOT AREA: 3.7 CM2
DOP CALC LVOT DIAMETER: 2.18 CM
DOP CALC LVOT PEAK VEL: 1.09 M/S
DOP CALC LVOT STROKE VOLUME: 93.97 CM3
DOP CALCLVOT PEAK VEL VTI: 25.19 CM
E WAVE DECELERATION TIME: 223.41 MSEC
E/A RATIO: 0.82
E/E' RATIO: 8.12 M/S
ECHO LV POSTERIOR WALL: 0.94 CM (ref 0.6–1.1)
EJECTION FRACTION: 65 %
FRACTIONAL SHORTENING: 43 % (ref 28–44)
INTERVENTRICULAR SEPTUM: 0.95 CM (ref 0.6–1.1)
IVRT: 91.34 MSEC
LA MAJOR: 5.99 CM
LA MINOR: 5.8 CM
LA WIDTH: 4.29 CM
LEFT ATRIUM SIZE: 4.15 CM
LEFT ATRIUM VOLUME INDEX MOD: 37.8 ML/M2
LEFT ATRIUM VOLUME INDEX: 45 ML/M2
LEFT ATRIUM VOLUME MOD: 74.83 CM3
LEFT ATRIUM VOLUME: 89.19 CM3
LEFT INTERNAL DIMENSION IN SYSTOLE: 2.81 CM (ref 2.1–4)
LEFT VENTRICLE DIASTOLIC VOLUME INDEX: 57.1 ML/M2
LEFT VENTRICLE DIASTOLIC VOLUME: 113.05 ML
LEFT VENTRICLE MASS INDEX: 82 G/M2
LEFT VENTRICLE SYSTOLIC VOLUME INDEX: 15 ML/M2
LEFT VENTRICLE SYSTOLIC VOLUME: 29.74 ML
LEFT VENTRICULAR INTERNAL DIMENSION IN DIASTOLE: 4.9 CM (ref 3.5–6)
LEFT VENTRICULAR MASS: 163.17 G
LV LATERAL E/E' RATIO: 6.9 M/S
LV SEPTAL E/E' RATIO: 9.86 M/S
MV PEAK A VEL: 0.84 M/S
MV PEAK E VEL: 0.69 M/S
MV STENOSIS PRESSURE HALF TIME: 64.79 MS
MV VALVE AREA P 1/2 METHOD: 3.4 CM2
PISA TR MAX VEL: 2.26 M/S
PULM VEIN S/D RATIO: 1.33
PV PEAK D VEL: 0.46 M/S
PV PEAK S VEL: 0.61 M/S
RA MAJOR: 4.66 CM
RA PRESSURE: 3 MMHG
RA WIDTH: 3.15 CM
RIGHT VENTRICULAR END-DIASTOLIC DIMENSION: 2.87 CM
SINUS: 3.49 CM
STJ: 2.77 CM
TDI LATERAL: 0.1 M/S
TDI SEPTAL: 0.07 M/S
TDI: 0.09 M/S
TR MAX PG: 20 MMHG
TRICUSPID ANNULAR PLANE SYSTOLIC EXCURSION: 2.11 CM
TV REST PULMONARY ARTERY PRESSURE: 23 MMHG

## 2021-06-24 PROCEDURE — 93306 TTE W/DOPPLER COMPLETE: CPT

## 2021-06-24 PROCEDURE — 93306 TTE W/DOPPLER COMPLETE: CPT | Mod: 26,,, | Performed by: INTERNAL MEDICINE

## 2021-06-24 PROCEDURE — 93306 ECHO (CUPID ONLY): ICD-10-PCS | Mod: 26,,, | Performed by: INTERNAL MEDICINE

## 2021-06-25 ENCOUNTER — TELEPHONE (OUTPATIENT)
Dept: CARDIOLOGY | Facility: CLINIC | Age: 56
End: 2021-06-25

## 2021-08-21 ENCOUNTER — PATIENT MESSAGE (OUTPATIENT)
Dept: ENDOSCOPY | Facility: HOSPITAL | Age: 56
End: 2021-08-21

## 2021-08-21 ENCOUNTER — TELEPHONE (OUTPATIENT)
Dept: ENDOSCOPY | Facility: HOSPITAL | Age: 56
End: 2021-08-21

## 2021-08-25 ENCOUNTER — PATIENT OUTREACH (OUTPATIENT)
Dept: ADMINISTRATIVE | Facility: OTHER | Age: 56
End: 2021-08-25

## 2021-08-30 ENCOUNTER — TELEPHONE (OUTPATIENT)
Dept: ENDOSCOPY | Facility: HOSPITAL | Age: 56
End: 2021-08-30

## 2021-09-10 ENCOUNTER — OFFICE VISIT (OUTPATIENT)
Dept: GASTROENTEROLOGY | Facility: CLINIC | Age: 56
End: 2021-09-10
Payer: COMMERCIAL

## 2021-09-10 VITALS — HEIGHT: 70 IN | WEIGHT: 170 LBS | BODY MASS INDEX: 24.34 KG/M2

## 2021-09-10 DIAGNOSIS — R14.2 ERUCTATION: ICD-10-CM

## 2021-09-10 DIAGNOSIS — K21.9 GASTROESOPHAGEAL REFLUX DISEASE, UNSPECIFIED WHETHER ESOPHAGITIS PRESENT: Primary | ICD-10-CM

## 2021-09-10 DIAGNOSIS — R10.13 DYSPEPSIA: ICD-10-CM

## 2021-09-10 DIAGNOSIS — R14.2 BELCHING: ICD-10-CM

## 2021-09-10 PROCEDURE — 99204 PR OFFICE/OUTPT VISIT, NEW, LEVL IV, 45-59 MIN: ICD-10-PCS | Mod: 95,,, | Performed by: INTERNAL MEDICINE

## 2021-09-10 PROCEDURE — 99204 OFFICE O/P NEW MOD 45 MIN: CPT | Mod: 95,,, | Performed by: INTERNAL MEDICINE

## 2021-09-10 PROCEDURE — 1160F RVW MEDS BY RX/DR IN RCRD: CPT | Mod: CPTII,,, | Performed by: INTERNAL MEDICINE

## 2021-09-10 PROCEDURE — 3008F BODY MASS INDEX DOCD: CPT | Mod: CPTII,,, | Performed by: INTERNAL MEDICINE

## 2021-09-10 PROCEDURE — 1159F PR MEDICATION LIST DOCUMENTED IN MEDICAL RECORD: ICD-10-PCS | Mod: CPTII,,, | Performed by: INTERNAL MEDICINE

## 2021-09-10 PROCEDURE — 1160F PR REVIEW ALL MEDS BY PRESCRIBER/CLIN PHARMACIST DOCUMENTED: ICD-10-PCS | Mod: CPTII,,, | Performed by: INTERNAL MEDICINE

## 2021-09-10 PROCEDURE — 1159F MED LIST DOCD IN RCRD: CPT | Mod: CPTII,,, | Performed by: INTERNAL MEDICINE

## 2021-09-10 PROCEDURE — 3008F PR BODY MASS INDEX (BMI) DOCUMENTED: ICD-10-PCS | Mod: CPTII,,, | Performed by: INTERNAL MEDICINE

## 2021-09-10 RX ORDER — FAMOTIDINE 20 MG/1
20 TABLET, FILM COATED ORAL EVERY 12 HOURS
Qty: 180 TABLET | Refills: 3 | Status: SHIPPED | OUTPATIENT
Start: 2021-09-10 | End: 2022-09-05

## 2021-09-15 ENCOUNTER — LAB VISIT (OUTPATIENT)
Dept: LAB | Facility: HOSPITAL | Age: 56
End: 2021-09-15
Attending: INTERNAL MEDICINE
Payer: COMMERCIAL

## 2021-09-15 DIAGNOSIS — R14.2 BELCHING: ICD-10-CM

## 2021-09-15 DIAGNOSIS — R10.13 DYSPEPSIA: ICD-10-CM

## 2021-09-15 DIAGNOSIS — R14.2 ERUCTATION: ICD-10-CM

## 2021-09-15 DIAGNOSIS — K21.9 GASTROESOPHAGEAL REFLUX DISEASE, UNSPECIFIED WHETHER ESOPHAGITIS PRESENT: ICD-10-CM

## 2021-09-15 LAB
25(OH)D3+25(OH)D2 SERPL-MCNC: 27 NG/ML (ref 30–96)
ALBUMIN SERPL BCP-MCNC: 4.1 G/DL (ref 3.5–5.2)
ALP SERPL-CCNC: 50 U/L (ref 55–135)
ALT SERPL W/O P-5'-P-CCNC: 17 U/L (ref 10–44)
ANION GAP SERPL CALC-SCNC: 9 MMOL/L (ref 8–16)
AST SERPL-CCNC: 20 U/L (ref 10–40)
BILIRUB SERPL-MCNC: 0.8 MG/DL (ref 0.1–1)
BUN SERPL-MCNC: 14 MG/DL (ref 6–20)
CALCIUM SERPL-MCNC: 9.6 MG/DL (ref 8.7–10.5)
CHLORIDE SERPL-SCNC: 104 MMOL/L (ref 95–110)
CO2 SERPL-SCNC: 28 MMOL/L (ref 23–29)
CREAT SERPL-MCNC: 0.9 MG/DL (ref 0.5–1.4)
EST. GFR  (AFRICAN AMERICAN): >60 ML/MIN/1.73 M^2
EST. GFR  (NON AFRICAN AMERICAN): >60 ML/MIN/1.73 M^2
GLUCOSE SERPL-MCNC: 91 MG/DL (ref 70–110)
HBV SURFACE AG SERPL QL IA: NEGATIVE
HCV AB SERPL QL IA: NEGATIVE
HEPATITIS A ANTIBODY, IGG: NEGATIVE
IGA SERPL-MCNC: 146 MG/DL (ref 40–350)
LIPASE SERPL-CCNC: 57 U/L (ref 4–60)
MAGNESIUM SERPL-MCNC: 2.3 MG/DL (ref 1.6–2.6)
POTASSIUM SERPL-SCNC: 4.3 MMOL/L (ref 3.5–5.1)
PROT SERPL-MCNC: 6.9 G/DL (ref 6–8.4)
SODIUM SERPL-SCNC: 141 MMOL/L (ref 136–145)
TSH SERPL DL<=0.005 MIU/L-ACNC: 1.44 UIU/ML (ref 0.4–4)
VIT B12 SERPL-MCNC: 447 PG/ML (ref 210–950)

## 2021-09-15 PROCEDURE — 83690 ASSAY OF LIPASE: CPT | Performed by: INTERNAL MEDICINE

## 2021-09-15 PROCEDURE — 83516 IMMUNOASSAY NONANTIBODY: CPT | Performed by: INTERNAL MEDICINE

## 2021-09-15 PROCEDURE — 80053 COMPREHEN METABOLIC PANEL: CPT | Performed by: INTERNAL MEDICINE

## 2021-09-15 PROCEDURE — 87340 HEPATITIS B SURFACE AG IA: CPT | Performed by: INTERNAL MEDICINE

## 2021-09-15 PROCEDURE — 82306 VITAMIN D 25 HYDROXY: CPT | Performed by: INTERNAL MEDICINE

## 2021-09-15 PROCEDURE — 82784 ASSAY IGA/IGD/IGG/IGM EACH: CPT | Performed by: INTERNAL MEDICINE

## 2021-09-15 PROCEDURE — 36415 COLL VENOUS BLD VENIPUNCTURE: CPT | Performed by: INTERNAL MEDICINE

## 2021-09-15 PROCEDURE — 83735 ASSAY OF MAGNESIUM: CPT | Performed by: INTERNAL MEDICINE

## 2021-09-15 PROCEDURE — 86790 VIRUS ANTIBODY NOS: CPT | Performed by: INTERNAL MEDICINE

## 2021-09-15 PROCEDURE — 84630 ASSAY OF ZINC: CPT | Performed by: INTERNAL MEDICINE

## 2021-09-15 PROCEDURE — 86706 HEP B SURFACE ANTIBODY: CPT | Performed by: INTERNAL MEDICINE

## 2021-09-15 PROCEDURE — 82607 VITAMIN B-12: CPT | Performed by: INTERNAL MEDICINE

## 2021-09-15 PROCEDURE — 86803 HEPATITIS C AB TEST: CPT | Performed by: INTERNAL MEDICINE

## 2021-09-15 PROCEDURE — 84443 ASSAY THYROID STIM HORMONE: CPT | Performed by: INTERNAL MEDICINE

## 2021-09-17 LAB — ZINC SERPL-MCNC: 75 UG/DL (ref 60–130)

## 2021-09-18 LAB
HBV SURFACE AB SER QL IA: NEGATIVE
HBV SURFACE AB SERPL IA-ACNC: 3 MIU/ML

## 2021-09-19 DIAGNOSIS — E55.9 VITAMIN D INSUFFICIENCY: Primary | ICD-10-CM

## 2021-09-19 RX ORDER — ACETAMINOPHEN 500 MG
2 TABLET ORAL DAILY
Qty: 180 CAPSULE | Refills: 3 | Status: SHIPPED | OUTPATIENT
Start: 2021-09-19 | End: 2022-03-20 | Stop reason: SDUPTHER

## 2021-09-19 RX ORDER — ERGOCALCIFEROL 1.25 MG/1
50000 CAPSULE ORAL
Qty: 12 CAPSULE | Refills: 0 | Status: SHIPPED | OUTPATIENT
Start: 2021-09-19 | End: 2021-12-12

## 2021-09-20 LAB — TTG IGA SER-ACNC: 4 UNITS

## 2021-09-22 ENCOUNTER — TELEPHONE (OUTPATIENT)
Dept: GASTROENTEROLOGY | Facility: CLINIC | Age: 56
End: 2021-09-22

## 2021-09-24 ENCOUNTER — PATIENT MESSAGE (OUTPATIENT)
Dept: INTERNAL MEDICINE | Facility: CLINIC | Age: 56
End: 2021-09-24

## 2021-10-07 ENCOUNTER — OFFICE VISIT (OUTPATIENT)
Dept: UROLOGY | Facility: CLINIC | Age: 56
End: 2021-10-07
Payer: COMMERCIAL

## 2021-10-07 VITALS
DIASTOLIC BLOOD PRESSURE: 78 MMHG | HEART RATE: 63 BPM | SYSTOLIC BLOOD PRESSURE: 145 MMHG | HEIGHT: 70 IN | WEIGHT: 178.56 LBS | BODY MASS INDEX: 25.56 KG/M2

## 2021-10-07 DIAGNOSIS — N13.8 BPH WITH URINARY OBSTRUCTION: ICD-10-CM

## 2021-10-07 DIAGNOSIS — R10.9 FLANK PAIN: Primary | ICD-10-CM

## 2021-10-07 DIAGNOSIS — N40.1 BPH WITH URINARY OBSTRUCTION: ICD-10-CM

## 2021-10-07 PROBLEM — N20.0 KIDNEY STONE: Status: RESOLVED | Noted: 2018-01-24 | Resolved: 2021-10-07

## 2021-10-07 PROCEDURE — 3008F BODY MASS INDEX DOCD: CPT | Mod: CPTII,S$GLB,, | Performed by: UROLOGY

## 2021-10-07 PROCEDURE — 1159F MED LIST DOCD IN RCRD: CPT | Mod: CPTII,S$GLB,, | Performed by: UROLOGY

## 2021-10-07 PROCEDURE — 3008F PR BODY MASS INDEX (BMI) DOCUMENTED: ICD-10-PCS | Mod: CPTII,S$GLB,, | Performed by: UROLOGY

## 2021-10-07 PROCEDURE — 1160F RVW MEDS BY RX/DR IN RCRD: CPT | Mod: CPTII,S$GLB,, | Performed by: UROLOGY

## 2021-10-07 PROCEDURE — 3078F PR MOST RECENT DIASTOLIC BLOOD PRESSURE < 80 MM HG: ICD-10-PCS | Mod: CPTII,S$GLB,, | Performed by: UROLOGY

## 2021-10-07 PROCEDURE — 3077F PR MOST RECENT SYSTOLIC BLOOD PRESSURE >= 140 MM HG: ICD-10-PCS | Mod: CPTII,S$GLB,, | Performed by: UROLOGY

## 2021-10-07 PROCEDURE — 3077F SYST BP >= 140 MM HG: CPT | Mod: CPTII,S$GLB,, | Performed by: UROLOGY

## 2021-10-07 PROCEDURE — 99203 PR OFFICE/OUTPT VISIT, NEW, LEVL III, 30-44 MIN: ICD-10-PCS | Mod: S$GLB,,, | Performed by: UROLOGY

## 2021-10-07 PROCEDURE — 99999 PR PBB SHADOW E&M-EST. PATIENT-LVL III: ICD-10-PCS | Mod: PBBFAC,,, | Performed by: UROLOGY

## 2021-10-07 PROCEDURE — 1160F PR REVIEW ALL MEDS BY PRESCRIBER/CLIN PHARMACIST DOCUMENTED: ICD-10-PCS | Mod: CPTII,S$GLB,, | Performed by: UROLOGY

## 2021-10-07 PROCEDURE — 1159F PR MEDICATION LIST DOCUMENTED IN MEDICAL RECORD: ICD-10-PCS | Mod: CPTII,S$GLB,, | Performed by: UROLOGY

## 2021-10-07 PROCEDURE — 99203 OFFICE O/P NEW LOW 30 MIN: CPT | Mod: S$GLB,,, | Performed by: UROLOGY

## 2021-10-07 PROCEDURE — 99999 PR PBB SHADOW E&M-EST. PATIENT-LVL III: CPT | Mod: PBBFAC,,, | Performed by: UROLOGY

## 2021-10-07 PROCEDURE — 3078F DIAST BP <80 MM HG: CPT | Mod: CPTII,S$GLB,, | Performed by: UROLOGY

## 2021-10-08 ENCOUNTER — HOSPITAL ENCOUNTER (OUTPATIENT)
Dept: RADIOLOGY | Facility: HOSPITAL | Age: 56
Discharge: HOME | End: 2021-10-08
Attending: UROLOGY
Payer: COMMERCIAL

## 2021-10-08 DIAGNOSIS — R10.9 FLANK PAIN: ICD-10-CM

## 2021-10-08 PROCEDURE — 76770 US EXAM ABDO BACK WALL COMP: CPT | Mod: 26,,, | Performed by: RADIOLOGY

## 2021-10-08 PROCEDURE — 76770 US EXAM ABDO BACK WALL COMP: CPT | Mod: TC

## 2021-10-08 PROCEDURE — 76770 US KIDNEY: ICD-10-PCS | Mod: 26,,, | Performed by: RADIOLOGY

## 2021-10-11 ENCOUNTER — TELEPHONE (OUTPATIENT)
Dept: UROLOGY | Facility: CLINIC | Age: 56
End: 2021-10-11

## 2021-10-13 DIAGNOSIS — M54.50 CHRONIC BILATERAL LOW BACK PAIN WITHOUT SCIATICA: ICD-10-CM

## 2021-10-13 DIAGNOSIS — G89.29 CHRONIC BILATERAL LOW BACK PAIN WITHOUT SCIATICA: ICD-10-CM

## 2021-10-13 RX ORDER — TRAMADOL HYDROCHLORIDE 50 MG/1
50 TABLET ORAL NIGHTLY PRN
Qty: 30 TABLET | Refills: 0 | Status: SHIPPED | OUTPATIENT
Start: 2021-10-13 | End: 2021-11-10 | Stop reason: SDUPTHER

## 2021-11-03 ENCOUNTER — LAB VISIT (OUTPATIENT)
Dept: LAB | Facility: HOSPITAL | Age: 56
End: 2021-11-03
Attending: INTERNAL MEDICINE
Payer: COMMERCIAL

## 2021-11-03 DIAGNOSIS — R73.03 PREDIABETES: ICD-10-CM

## 2021-11-03 DIAGNOSIS — E78.2 MIXED HYPERLIPIDEMIA: ICD-10-CM

## 2021-11-03 LAB
ALBUMIN SERPL BCP-MCNC: 4 G/DL (ref 3.5–5.2)
ALP SERPL-CCNC: 52 U/L (ref 55–135)
ALT SERPL W/O P-5'-P-CCNC: 17 U/L (ref 10–44)
ANION GAP SERPL CALC-SCNC: 9 MMOL/L (ref 8–16)
AST SERPL-CCNC: 20 U/L (ref 10–40)
BASOPHILS # BLD AUTO: 0.05 K/UL (ref 0–0.2)
BASOPHILS NFR BLD: 1 % (ref 0–1.9)
BILIRUB SERPL-MCNC: 0.5 MG/DL (ref 0.1–1)
BUN SERPL-MCNC: 16 MG/DL (ref 6–20)
CALCIUM SERPL-MCNC: 9.4 MG/DL (ref 8.7–10.5)
CHLORIDE SERPL-SCNC: 104 MMOL/L (ref 95–110)
CHOLEST SERPL-MCNC: 221 MG/DL (ref 120–199)
CHOLEST/HDLC SERPL: 2.4 {RATIO} (ref 2–5)
CO2 SERPL-SCNC: 28 MMOL/L (ref 23–29)
CREAT SERPL-MCNC: 0.8 MG/DL (ref 0.5–1.4)
DIFFERENTIAL METHOD: NORMAL
EOSINOPHIL # BLD AUTO: 0.2 K/UL (ref 0–0.5)
EOSINOPHIL NFR BLD: 3.3 % (ref 0–8)
ERYTHROCYTE [DISTWIDTH] IN BLOOD BY AUTOMATED COUNT: 14.2 % (ref 11.5–14.5)
EST. GFR  (AFRICAN AMERICAN): >60 ML/MIN/1.73 M^2
EST. GFR  (NON AFRICAN AMERICAN): >60 ML/MIN/1.73 M^2
ESTIMATED AVG GLUCOSE: 108 MG/DL (ref 68–131)
GLUCOSE SERPL-MCNC: 91 MG/DL (ref 70–110)
HBA1C MFR BLD: 5.4 % (ref 4–5.6)
HCT VFR BLD AUTO: 49 % (ref 40–54)
HDLC SERPL-MCNC: 92 MG/DL (ref 40–75)
HDLC SERPL: 41.6 % (ref 20–50)
HGB BLD-MCNC: 15.9 G/DL (ref 14–18)
IMM GRANULOCYTES # BLD AUTO: 0.01 K/UL (ref 0–0.04)
IMM GRANULOCYTES NFR BLD AUTO: 0.2 % (ref 0–0.5)
LDLC SERPL CALC-MCNC: 120 MG/DL (ref 63–159)
LYMPHOCYTES # BLD AUTO: 1.2 K/UL (ref 1–4.8)
LYMPHOCYTES NFR BLD: 25 % (ref 18–48)
MCH RBC QN AUTO: 30.1 PG (ref 27–31)
MCHC RBC AUTO-ENTMCNC: 32.4 G/DL (ref 32–36)
MCV RBC AUTO: 93 FL (ref 82–98)
MONOCYTES # BLD AUTO: 0.7 K/UL (ref 0.3–1)
MONOCYTES NFR BLD: 14.5 % (ref 4–15)
NEUTROPHILS # BLD AUTO: 2.7 K/UL (ref 1.8–7.7)
NEUTROPHILS NFR BLD: 56 % (ref 38–73)
NONHDLC SERPL-MCNC: 129 MG/DL
NRBC BLD-RTO: 0 /100 WBC
PLATELET # BLD AUTO: 181 K/UL (ref 150–450)
PMV BLD AUTO: 10.3 FL (ref 9.2–12.9)
POTASSIUM SERPL-SCNC: 4.7 MMOL/L (ref 3.5–5.1)
PROT SERPL-MCNC: 6.9 G/DL (ref 6–8.4)
RBC # BLD AUTO: 5.29 M/UL (ref 4.6–6.2)
SODIUM SERPL-SCNC: 141 MMOL/L (ref 136–145)
TRIGL SERPL-MCNC: 45 MG/DL (ref 30–150)
TSH SERPL DL<=0.005 MIU/L-ACNC: 1.82 UIU/ML (ref 0.4–4)
WBC # BLD AUTO: 4.88 K/UL (ref 3.9–12.7)

## 2021-11-03 PROCEDURE — 80053 COMPREHEN METABOLIC PANEL: CPT | Performed by: INTERNAL MEDICINE

## 2021-11-03 PROCEDURE — 80061 LIPID PANEL: CPT | Performed by: INTERNAL MEDICINE

## 2021-11-03 PROCEDURE — 84443 ASSAY THYROID STIM HORMONE: CPT | Performed by: INTERNAL MEDICINE

## 2021-11-03 PROCEDURE — 83036 HEMOGLOBIN GLYCOSYLATED A1C: CPT | Performed by: INTERNAL MEDICINE

## 2021-11-03 PROCEDURE — 85025 COMPLETE CBC W/AUTO DIFF WBC: CPT | Performed by: INTERNAL MEDICINE

## 2021-11-03 PROCEDURE — 36415 COLL VENOUS BLD VENIPUNCTURE: CPT | Performed by: INTERNAL MEDICINE

## 2021-11-10 ENCOUNTER — OFFICE VISIT (OUTPATIENT)
Dept: INTERNAL MEDICINE | Facility: CLINIC | Age: 56
End: 2021-11-10
Payer: COMMERCIAL

## 2021-11-10 VITALS
BODY MASS INDEX: 25.54 KG/M2 | SYSTOLIC BLOOD PRESSURE: 123 MMHG | WEIGHT: 178.38 LBS | DIASTOLIC BLOOD PRESSURE: 72 MMHG | HEIGHT: 70 IN | HEART RATE: 65 BPM | OXYGEN SATURATION: 99 %

## 2021-11-10 DIAGNOSIS — M54.50 CHRONIC BILATERAL LOW BACK PAIN WITHOUT SCIATICA: ICD-10-CM

## 2021-11-10 DIAGNOSIS — E78.2 MIXED HYPERLIPIDEMIA: ICD-10-CM

## 2021-11-10 DIAGNOSIS — M54.2 CHRONIC NECK PAIN: ICD-10-CM

## 2021-11-10 DIAGNOSIS — G89.29 CHRONIC NECK PAIN: ICD-10-CM

## 2021-11-10 DIAGNOSIS — R73.03 PREDIABETES: ICD-10-CM

## 2021-11-10 DIAGNOSIS — I10 ESSENTIAL HYPERTENSION: Primary | ICD-10-CM

## 2021-11-10 DIAGNOSIS — G89.29 CHRONIC BILATERAL LOW BACK PAIN WITHOUT SCIATICA: ICD-10-CM

## 2021-11-10 PROCEDURE — 99999 PR PBB SHADOW E&M-EST. PATIENT-LVL III: CPT | Mod: PBBFAC,,, | Performed by: INTERNAL MEDICINE

## 2021-11-10 PROCEDURE — 99214 OFFICE O/P EST MOD 30 MIN: CPT | Mod: S$GLB,,, | Performed by: INTERNAL MEDICINE

## 2021-11-10 PROCEDURE — 99999 PR PBB SHADOW E&M-EST. PATIENT-LVL III: ICD-10-PCS | Mod: PBBFAC,,, | Performed by: INTERNAL MEDICINE

## 2021-11-10 PROCEDURE — 99214 PR OFFICE/OUTPT VISIT, EST, LEVL IV, 30-39 MIN: ICD-10-PCS | Mod: S$GLB,,, | Performed by: INTERNAL MEDICINE

## 2021-11-10 RX ORDER — TRAMADOL HYDROCHLORIDE 50 MG/1
50 TABLET ORAL NIGHTLY PRN
Qty: 30 TABLET | Refills: 2 | Status: SHIPPED | OUTPATIENT
Start: 2021-11-10 | End: 2022-05-11 | Stop reason: SDUPTHER

## 2021-12-01 ENCOUNTER — PATIENT MESSAGE (OUTPATIENT)
Dept: GASTROENTEROLOGY | Facility: CLINIC | Age: 56
End: 2021-12-01
Payer: COMMERCIAL

## 2021-12-01 DIAGNOSIS — Z87.19 HISTORY OF HERNIA REPAIR: Primary | ICD-10-CM

## 2021-12-01 DIAGNOSIS — Z98.890 HISTORY OF HERNIA REPAIR: Primary | ICD-10-CM

## 2021-12-02 ENCOUNTER — HOSPITAL ENCOUNTER (OUTPATIENT)
Dept: RADIOLOGY | Facility: HOSPITAL | Age: 56
Discharge: HOME | End: 2021-12-02
Attending: INTERNAL MEDICINE
Payer: COMMERCIAL

## 2021-12-02 DIAGNOSIS — Z98.890 HISTORY OF HERNIA REPAIR: ICD-10-CM

## 2021-12-02 DIAGNOSIS — Z87.19 HISTORY OF HERNIA REPAIR: ICD-10-CM

## 2021-12-02 DIAGNOSIS — K21.9 GASTROESOPHAGEAL REFLUX DISEASE, UNSPECIFIED WHETHER ESOPHAGITIS PRESENT: ICD-10-CM

## 2021-12-02 DIAGNOSIS — R14.2 ERUCTATION: ICD-10-CM

## 2021-12-02 DIAGNOSIS — R14.2 BELCHING: ICD-10-CM

## 2021-12-02 DIAGNOSIS — R10.13 DYSPEPSIA: ICD-10-CM

## 2021-12-02 PROCEDURE — 76705 US ABDOMEN LIMITED: ICD-10-PCS | Mod: 26,,, | Performed by: RADIOLOGY

## 2021-12-02 PROCEDURE — 76705 ECHO EXAM OF ABDOMEN: CPT | Mod: 26,,, | Performed by: RADIOLOGY

## 2021-12-02 PROCEDURE — 76705 ECHO EXAM OF ABDOMEN: CPT | Mod: TC

## 2021-12-23 ENCOUNTER — TELEPHONE (OUTPATIENT)
Dept: ENDOSCOPY | Facility: HOSPITAL | Age: 56
End: 2021-12-23
Payer: COMMERCIAL

## 2021-12-28 ENCOUNTER — PATIENT MESSAGE (OUTPATIENT)
Dept: ENDOSCOPY | Facility: HOSPITAL | Age: 56
End: 2021-12-28
Payer: COMMERCIAL

## 2022-01-19 ENCOUNTER — TELEPHONE (OUTPATIENT)
Dept: ENDOSCOPY | Facility: HOSPITAL | Age: 57
End: 2022-01-19
Payer: COMMERCIAL

## 2022-01-19 ENCOUNTER — PATIENT MESSAGE (OUTPATIENT)
Dept: ENDOSCOPY | Facility: HOSPITAL | Age: 57
End: 2022-01-19
Payer: COMMERCIAL

## 2022-02-22 ENCOUNTER — PATIENT MESSAGE (OUTPATIENT)
Dept: INTERNAL MEDICINE | Facility: CLINIC | Age: 57
End: 2022-02-22
Payer: COMMERCIAL

## 2022-02-22 ENCOUNTER — TELEPHONE (OUTPATIENT)
Dept: INTERNAL MEDICINE | Facility: CLINIC | Age: 57
End: 2022-02-22
Payer: COMMERCIAL

## 2022-02-22 DIAGNOSIS — K42.9 UMBILICAL HERNIA WITHOUT OBSTRUCTION AND WITHOUT GANGRENE: Primary | ICD-10-CM

## 2022-02-22 DIAGNOSIS — R73.03 PREDIABETES: ICD-10-CM

## 2022-02-28 DIAGNOSIS — Z01.818 PRE-OP TESTING: Primary | ICD-10-CM

## 2022-03-12 ENCOUNTER — HOSPITAL ENCOUNTER (OUTPATIENT)
Dept: RADIOLOGY | Facility: HOSPITAL | Age: 57
Discharge: HOME | End: 2022-03-12
Attending: INTERNAL MEDICINE
Payer: COMMERCIAL

## 2022-03-12 DIAGNOSIS — Z87.19 HISTORY OF HERNIA REPAIR: ICD-10-CM

## 2022-03-12 DIAGNOSIS — Z98.890 HISTORY OF HERNIA REPAIR: ICD-10-CM

## 2022-03-12 PROCEDURE — 76705 ECHO EXAM OF ABDOMEN: CPT | Mod: TC

## 2022-03-12 PROCEDURE — 76705 US ABDOMEN LIMITED_HERNIA: ICD-10-PCS | Mod: 26,,, | Performed by: RADIOLOGY

## 2022-03-12 PROCEDURE — 76705 ECHO EXAM OF ABDOMEN: CPT | Mod: 26,,, | Performed by: RADIOLOGY

## 2022-03-15 ENCOUNTER — PATIENT MESSAGE (OUTPATIENT)
Dept: INTERNAL MEDICINE | Facility: CLINIC | Age: 57
End: 2022-03-15
Payer: COMMERCIAL

## 2022-03-15 ENCOUNTER — PATIENT MESSAGE (OUTPATIENT)
Dept: ENDOSCOPY | Facility: HOSPITAL | Age: 57
End: 2022-03-15
Payer: COMMERCIAL

## 2022-03-16 ENCOUNTER — PATIENT MESSAGE (OUTPATIENT)
Dept: INTERNAL MEDICINE | Facility: CLINIC | Age: 57
End: 2022-03-16
Payer: COMMERCIAL

## 2022-03-18 ENCOUNTER — LAB VISIT (OUTPATIENT)
Dept: LAB | Facility: HOSPITAL | Age: 57
End: 2022-03-18
Attending: INTERNAL MEDICINE
Payer: COMMERCIAL

## 2022-03-18 ENCOUNTER — PATIENT MESSAGE (OUTPATIENT)
Dept: INTERNAL MEDICINE | Facility: CLINIC | Age: 57
End: 2022-03-18
Payer: COMMERCIAL

## 2022-03-18 DIAGNOSIS — E55.9 VITAMIN D INSUFFICIENCY: ICD-10-CM

## 2022-03-18 DIAGNOSIS — R73.03 PREDIABETES: ICD-10-CM

## 2022-03-18 LAB
25(OH)D3+25(OH)D2 SERPL-MCNC: 20 NG/ML (ref 30–96)
ALBUMIN SERPL BCP-MCNC: 4.3 G/DL (ref 3.5–5.2)
ALP SERPL-CCNC: 47 U/L (ref 55–135)
ALT SERPL W/O P-5'-P-CCNC: 18 U/L (ref 10–44)
ANION GAP SERPL CALC-SCNC: 8 MMOL/L (ref 8–16)
AST SERPL-CCNC: 21 U/L (ref 10–40)
BILIRUB SERPL-MCNC: 0.8 MG/DL (ref 0.1–1)
BUN SERPL-MCNC: 15 MG/DL (ref 6–20)
CALCIUM SERPL-MCNC: 9.8 MG/DL (ref 8.7–10.5)
CHLORIDE SERPL-SCNC: 103 MMOL/L (ref 95–110)
CO2 SERPL-SCNC: 31 MMOL/L (ref 23–29)
CREAT SERPL-MCNC: 0.8 MG/DL (ref 0.5–1.4)
EST. GFR  (AFRICAN AMERICAN): >60 ML/MIN/1.73 M^2
EST. GFR  (NON AFRICAN AMERICAN): >60 ML/MIN/1.73 M^2
GLUCOSE SERPL-MCNC: 90 MG/DL (ref 70–110)
POTASSIUM SERPL-SCNC: 4.3 MMOL/L (ref 3.5–5.1)
PROT SERPL-MCNC: 7.2 G/DL (ref 6–8.4)
SODIUM SERPL-SCNC: 142 MMOL/L (ref 136–145)

## 2022-03-18 PROCEDURE — 36415 COLL VENOUS BLD VENIPUNCTURE: CPT | Performed by: INTERNAL MEDICINE

## 2022-03-18 PROCEDURE — 82306 VITAMIN D 25 HYDROXY: CPT | Performed by: INTERNAL MEDICINE

## 2022-03-18 PROCEDURE — 80053 COMPREHEN METABOLIC PANEL: CPT | Performed by: INTERNAL MEDICINE

## 2022-03-20 DIAGNOSIS — E55.9 VITAMIN D INSUFFICIENCY: ICD-10-CM

## 2022-03-20 RX ORDER — ACETAMINOPHEN 500 MG
2 TABLET ORAL DAILY
Qty: 180 CAPSULE | Refills: 3 | Status: SHIPPED | OUTPATIENT
Start: 2022-03-20 | End: 2023-03-20

## 2022-03-20 RX ORDER — ERGOCALCIFEROL 1.25 MG/1
50000 CAPSULE ORAL
Qty: 12 CAPSULE | Refills: 0 | Status: SHIPPED | OUTPATIENT
Start: 2022-03-20 | End: 2022-06-06

## 2022-03-20 NOTE — PROGRESS NOTES
Gee baltazar abdominal ultrasound shows a small fat containing left inguinal hernia they do not identify any small bowel in the hernia if this is trouble some more bothersome to you with discomfort or pain in you would like me to refer you to general surgeon to discuss repair of the hernia please let me know and I will be happy to refer you.    Impression:     Small fat containing left inguinal hernia.     Electronically signed by resident: Radha Myles  Date:                                            03/12/2022  Time:                                           08:20     Electronically signed by: Bruno Bucio  Date:                                            03/12/2022

## 2022-03-20 NOTE — PROGRESS NOTES
Jaguar - Please tell patient that their Vitamin D level is low and recommend that they take Vitamin D 50,000 units ONCE A WEEK (every 7-days) for 12 weeks AND THEN start Vitamin D3 (4,000 units) over-the-counter ONCE DAILY.     Jaguar - please recheck their vitamin D level in 6 months - Orders placed.

## 2022-03-24 ENCOUNTER — TELEPHONE (OUTPATIENT)
Dept: GASTROENTEROLOGY | Facility: CLINIC | Age: 57
End: 2022-03-24
Payer: COMMERCIAL

## 2022-03-24 NOTE — TELEPHONE ENCOUNTER
Contact patient per Jaguar Landin - Please tell patient that their Vitamin D level is low and recommend that they take Vitamin D 50,000 units ONCE A WEEK (every 7-days) for 12 weeks AND THEN start Vitamin D3 (4,000 units) over-the-counter ONCE DAILY.     Jaguar - please recheck their vitamin D level in 6 months - Orders placed.     Patient verbalized understanding.

## 2022-03-24 NOTE — TELEPHONE ENCOUNTER
----- Message from Orlando Mckenzie MD sent at 3/20/2022  2:22 PM CDT -----  Jaguar - Please tell patient that their Vitamin D level is low and recommend that they take Vitamin D 50,000 units ONCE A WEEK (every 7-days) for 12 weeks AND THEN start Vitamin D3 (4,000 units) over-the-counter ONCE DAILY.     Jaguar - please recheck their vitamin D level in 6 months - Orders placed.

## 2022-03-28 ENCOUNTER — ANESTHESIA EVENT (OUTPATIENT)
Dept: ENDOSCOPY | Facility: HOSPITAL | Age: 57
End: 2022-03-28
Payer: COMMERCIAL

## 2022-03-28 ENCOUNTER — ANESTHESIA (OUTPATIENT)
Dept: ENDOSCOPY | Facility: HOSPITAL | Age: 57
End: 2022-03-28
Payer: COMMERCIAL

## 2022-03-28 ENCOUNTER — HOSPITAL ENCOUNTER (OUTPATIENT)
Facility: HOSPITAL | Age: 57
Discharge: HOME | End: 2022-03-28
Attending: INTERNAL MEDICINE | Admitting: INTERNAL MEDICINE
Payer: COMMERCIAL

## 2022-03-28 VITALS
TEMPERATURE: 98 F | WEIGHT: 175 LBS | HEIGHT: 70 IN | DIASTOLIC BLOOD PRESSURE: 69 MMHG | HEART RATE: 55 BPM | SYSTOLIC BLOOD PRESSURE: 129 MMHG | BODY MASS INDEX: 25.05 KG/M2 | OXYGEN SATURATION: 97 % | RESPIRATION RATE: 16 BRPM

## 2022-03-28 DIAGNOSIS — K21.9 GERD (GASTROESOPHAGEAL REFLUX DISEASE): ICD-10-CM

## 2022-03-28 PROCEDURE — 88305 TISSUE EXAM BY PATHOLOGIST: ICD-10-PCS | Mod: 26,,, | Performed by: PATHOLOGY

## 2022-03-28 PROCEDURE — 88305 TISSUE EXAM BY PATHOLOGIST: CPT | Mod: 26,,, | Performed by: PATHOLOGY

## 2022-03-28 PROCEDURE — E9220 PRA ENDO ANESTHESIA: HCPCS | Mod: ,,, | Performed by: NURSE ANESTHETIST, CERTIFIED REGISTERED

## 2022-03-28 PROCEDURE — 88305 TISSUE EXAM BY PATHOLOGIST: CPT | Mod: 59 | Performed by: PATHOLOGY

## 2022-03-28 PROCEDURE — 43239 EGD BIOPSY SINGLE/MULTIPLE: CPT | Mod: ,,, | Performed by: INTERNAL MEDICINE

## 2022-03-28 PROCEDURE — 43239 EGD BIOPSY SINGLE/MULTIPLE: CPT | Performed by: INTERNAL MEDICINE

## 2022-03-28 PROCEDURE — E9220 PRA ENDO ANESTHESIA: ICD-10-PCS | Mod: ,,, | Performed by: NURSE ANESTHETIST, CERTIFIED REGISTERED

## 2022-03-28 PROCEDURE — 25000003 PHARM REV CODE 250: Performed by: NURSE ANESTHETIST, CERTIFIED REGISTERED

## 2022-03-28 PROCEDURE — 25000003 PHARM REV CODE 250: Performed by: INTERNAL MEDICINE

## 2022-03-28 PROCEDURE — 37000009 HC ANESTHESIA EA ADD 15 MINS: Performed by: INTERNAL MEDICINE

## 2022-03-28 PROCEDURE — 37000008 HC ANESTHESIA 1ST 15 MINUTES: Performed by: INTERNAL MEDICINE

## 2022-03-28 PROCEDURE — 27201012 HC FORCEPS, HOT/COLD, DISP: Performed by: INTERNAL MEDICINE

## 2022-03-28 PROCEDURE — 63600175 PHARM REV CODE 636 W HCPCS: Performed by: NURSE ANESTHETIST, CERTIFIED REGISTERED

## 2022-03-28 PROCEDURE — 43239 PR EGD, FLEX, W/BIOPSY, SGL/MULTI: ICD-10-PCS | Mod: ,,, | Performed by: INTERNAL MEDICINE

## 2022-03-28 RX ORDER — LIDOCAINE HCL/PF 100 MG/5ML
SYRINGE (ML) INTRAVENOUS
Status: DISCONTINUED | OUTPATIENT
Start: 2022-03-28 | End: 2022-03-28

## 2022-03-28 RX ORDER — SODIUM CHLORIDE 9 MG/ML
INJECTION, SOLUTION INTRAVENOUS CONTINUOUS
Status: DISCONTINUED | OUTPATIENT
Start: 2022-03-28 | End: 2022-03-28 | Stop reason: HOSPADM

## 2022-03-28 RX ORDER — PROPOFOL 10 MG/ML
VIAL (ML) INTRAVENOUS
Status: DISCONTINUED | OUTPATIENT
Start: 2022-03-28 | End: 2022-03-28

## 2022-03-28 RX ADMIN — SODIUM CHLORIDE: 0.9 INJECTION, SOLUTION INTRAVENOUS at 07:03

## 2022-03-28 RX ADMIN — GLYCOPYRROLATE 0.2 MG: 0.2 INJECTION, SOLUTION INTRAMUSCULAR; INTRAVITREAL at 09:03

## 2022-03-28 RX ADMIN — PROPOFOL 30 MG: 10 INJECTION, EMULSION INTRAVENOUS at 09:03

## 2022-03-28 RX ADMIN — PROPOFOL 50 MG: 10 INJECTION, EMULSION INTRAVENOUS at 09:03

## 2022-03-28 RX ADMIN — PROPOFOL 70 MG: 10 INJECTION, EMULSION INTRAVENOUS at 09:03

## 2022-03-28 RX ADMIN — Medication 40 MG: at 09:03

## 2022-03-28 RX ADMIN — PROPOFOL 80 MG: 10 INJECTION, EMULSION INTRAVENOUS at 09:03

## 2022-03-28 NOTE — ANESTHESIA POSTPROCEDURE EVALUATION
Anesthesia Post Evaluation    Patient: Gee Montez    Procedure(s) Performed: Procedure(s) (LRB):  EGD (ESOPHAGOGASTRODUODENOSCOPY) (N/A)    Final Anesthesia Type: general      Patient location during evaluation: GI PACU  Patient participation: Yes- Able to Participate  Level of consciousness: awake and alert  Post-procedure vital signs: reviewed and stable  Pain management: adequate  Airway patency: patent    PONV status at discharge: No PONV  Anesthetic complications: no      Cardiovascular status: blood pressure returned to baseline  Respiratory status: unassisted, spontaneous ventilation and room air  Hydration status: euvolemic            Vitals Value Taken Time   /69 03/28/22 0958   Temp 36.8 °C (98.2 °F) 03/28/22 0928   Pulse 55 03/28/22 0958   Resp 16 03/28/22 0958   SpO2 97 % 03/28/22 0958         Event Time   Out of Recovery 10:17:00         Pain/Tyree Score: Tyree Score: 9 (3/28/2022  9:28 AM)

## 2022-03-28 NOTE — H&P
[facility]  History & Physical    Subjective:      Chief Complaint/Reason for Admission:     EGD    Gee Montez is a 56 y.o. male.    Past Medical History:   Diagnosis Date    Chronic neck pain     Frequent PVCs     Hyperlipidemia      Past Surgical History:   Procedure Laterality Date    COLONOSCOPY N/A 3/20/2020    Procedure: COLONOSCOPY;  Surgeon: Korey Arteaga MD;  Location: [facility] ([address] [place] [address]);  Service: Endoscopy;  Laterality: N/A;  3/16 - confirmed case and new arrival time and patient aware of new policies-   3/18/20 - confirmed new arrival time on 06, drop off policy reviewed, 2nd half prep completed from [id number], Pt verbalized understanding- ERW  3/19/20-patient notified of updated drop o     Family History   Problem Relation Age of Onset    Ulcers Mother     Heart disease Mother     Diabetes Mother     Ulcers Father     Stroke Father     Stroke Paternal Grandmother     Cancer Paternal Grandfather 64        CRC    Colon cancer Paternal Grandfather 62    Celiac disease Neg Hx     Cirrhosis Neg Hx     Colon polyps Neg Hx     Crohn's disease Neg Hx     Esophageal cancer Neg Hx     Ulcerative colitis Neg Hx     Stomach cancer Neg Hx     Rectal cancer Neg Hx     Liver disease Neg Hx     Inflammatory bowel disease Neg Hx     Liver cancer Neg Hx     Irritable bowel syndrome Neg Hx     Hemochromatosis Neg Hx     Cystic fibrosis Neg Hx     Wilmer's disease Neg Hx     Lymphoma Neg Hx     Tuberculosis Neg Hx     Scleroderma Neg Hx     Multiple sclerosis Neg Hx     Melanoma Neg Hx     Lupus Neg Hx     Rheum arthritis Neg Hx     Psoriasis Neg Hx     Pancreatic cancer Neg Hx     Uterine cancer Neg Hx     Ovarian cancer Neg Hx     Bladder Cancer Neg Hx     Kidney cancer Neg Hx      Social History     Tobacco Use    Smoking status: Former Smoker     Quit date: 3/1/1998     Years since quittin.0    Smokeless tobacco: Never Used    Substance Use Topics    Alcohol use: Yes     Alcohol/week: 5.0 standard drinks     Types: 5 Glasses of wine per week     Comment: 2-3 drinks on weekend days, 5 drinks per week    Drug use: Yes       PTA Medications   Medication Sig    famotidine (PEPCID) 20 MG tablet Take 1 tablet (20 mg total) by mouth every 12 (twelve) hours.    traMADoL (ULTRAM) 50 mg tablet Take 1 tablet (50 mg total) by mouth nightly as needed for Pain.    cholecalciferol, vitamin D3, (VITAMIN D3) 50 mcg (2,000 unit) Cap Take 2 capsules (4,000 Units total) by mouth once daily.    ergocalciferol (ERGOCALCIFEROL) 50,000 unit Cap Take 1 capsule (50,000 Units total) by mouth every 7 days. for 12 doses     Review of patient's allergies indicates:   Allergen Reactions    Adhes. band-tape-benzalkonium     Adhesive tape-silicones     Aspirin      Hurts stomach    Benadryl itch relief Nausea Only and Palpitations        ROS    Objective:      Vital Signs (Most Recent)  Temp: 97.7 °F (36.5 °C) (03/28/22 0808)  Pulse: 61 (03/28/22 0808)  Resp: 18 (03/28/22 0808)  BP: (!) 140/78 (03/28/22 0808)  SpO2: 99 % (03/28/22 0808)    Vital Signs Range (Last 24H):  Temp:  [97.7 °F (36.5 °C)]   Pulse:  [61]   Resp:  [18]   BP: (140)/(78)   SpO2:  [99 %]     Physical Exam  Constitutional:       Appearance: Normal appearance.   Cardiovascular:      Rate and Rhythm: Normal rate.   Pulmonary:      Effort: Pulmonary effort is normal.   Neurological:      Mental Status: He is oriented to person, place, and time.         Assessment:      There are no hospital problems to display for this patient.      Plan:    EGD

## 2022-03-28 NOTE — ANESTHESIA PREPROCEDURE EVALUATION
03/28/2022  Gee Montez is a 56 y.o., male.  Past Surgical History:   Procedure Laterality Date    COLONOSCOPY N/A 3/20/2020    Procedure: COLONOSCOPY;  Surgeon: Korey Arteaga MD;  Location: [facility] ([address] [place] [address]);  Service: Endoscopy;  Laterality: N/A;  3/16 - confirmed case and new arrival time and patient aware of new policies- sm  3/18/20 - confirmed new arrival time on 0630, drop off policy reviewed, 2nd half prep completed from [id number], Pt verbalized understanding- ERW  3/19/20-patient notified of updated drop o     Past Medical History:   Diagnosis Date    Chronic neck pain     Frequent PVCs     Hyperlipidemia      6/24/21  Normal LV size with mild concentric hypertrophy.  Normal systolic function with an estimated ejection fraction of 65%.  Moderate left atrial enlargement.  Grade 1 diastolic dysfunction.  No significant valvular disease.      Pre-op Assessment    I have reviewed the Patient Summary Reports.    I have reviewed the Nursing Notes.    I have reviewed the Medications.     Review of Systems  Anesthesia Hx:  No problems with previous Anesthesia  History of prior surgery of interest to airway management or planning:   Social:  Former Smoker, Social Alcohol Use    Hematology/Oncology:  Hematology Normal   Oncology Normal     EENT/Dental:EENT/Dental Normal   Cardiovascular:  Cardiovascular Normal Exercise tolerance: good     Pulmonary:  Pulmonary Normal    Renal/:  Renal/ Normal Chronic Renal Disease     Hepatic/GI:  Hepatic/GI Normal    Musculoskeletal:  Musculoskeletal Normal    Neurological:  Neurology Normal    Endocrine:  Endocrine Normal    Dermatological:  Skin Normal    Psych:  Psychiatric Normal           Physical Exam  General:  Obesity      Airway/Jaw/Neck:  Airway Findings: Mouth Opening: Normal   Tongue: Normal   General Airway Assessment: Adult Mallampati: II  TM  Distance: Normal, at least 6 cm   Jaw/Neck Findings:  Neck ROM: Normal ROM       Dental:  Dental Findings: In tact          Mental Status:  Mental Status Findings:  Cooperative, Alert and Oriented         Anesthesia Plan  Type of Anesthesia, risks & benefits discussed:  Anesthesia Type:  general    Patient's Preference: GA  Plan Factors:          Intra-op Monitoring Plan: standard ASA monitors  Intra-op Monitoring Plan Comments:   Post Op Pain Control Plan: multimodal analgesia  Post Op Pain Control Plan Comments:     Induction:   IV  Beta Blocker:  Patient is on a Beta-Blocker and has received one dose within the past 24 hours (No further documentation required).       Informed Consent: Informed consent signed with the Patient and all parties understand the risks and agree with anesthesia plan.  All questions answered.  Anesthesia consent signed with patient.  ASA Score: 2     Day of Surgery Review of History & Physical:  There are no significant changes.            Ready For Surgery From Anesthesia Perspective.           Physical Exam  General: Obesity    Airway:  Mallampati: II   Mouth Opening: Normal  TM Distance: Normal, at least 6 cm  Tongue: Normal  Neck ROM: Normal ROM    Dental:  In tact          Anesthesia Plan  Type of Anesthesia, risks & benefits discussed:    Anesthesia Type: general  Intra-op Monitoring Plan: standard ASA monitors  Post Op Pain Control Plan: multimodal analgesia  Induction:  IV  Informed Consent: Informed consent signed with the Patient and all parties understand the risks and agree with anesthesia plan.  All questions answered.   ASA Score: 2    Ready For Surgery From Anesthesia Perspective.       .

## 2022-03-28 NOTE — H&P
[facility]  History & Physical    Subjective:      Chief Complaint/Reason for Admission:    EGD    Gee Montez is a 56 y.o. male.    Past Medical History:   Diagnosis Date    Chronic neck pain     Frequent PVCs     Hyperlipidemia      Past Surgical History:   Procedure Laterality Date    COLONOSCOPY N/A 3/20/2020    Procedure: COLONOSCOPY;  Surgeon: Korey Atreaga MD;  Location: [facility] ([address] [place] [address]);  Service: Endoscopy;  Laterality: N/A;  3/16 - confirmed case and new arrival time and patient aware of new policies-   3/18/20 - confirmed new arrival time on 06, drop off policy reviewed, 2nd half prep completed from [id number], Pt verbalized understanding- ERW  3/19/20-patient notified of updated drop o     Family History   Problem Relation Age of Onset    Ulcers Mother     Heart disease Mother     Diabetes Mother     Ulcers Father     Stroke Father     Stroke Paternal Grandmother     Cancer Paternal Grandfather 64        CRC    Colon cancer Paternal Grandfather 62    Celiac disease Neg Hx     Cirrhosis Neg Hx     Colon polyps Neg Hx     Crohn's disease Neg Hx     Esophageal cancer Neg Hx     Ulcerative colitis Neg Hx     Stomach cancer Neg Hx     Rectal cancer Neg Hx     Liver disease Neg Hx     Inflammatory bowel disease Neg Hx     Liver cancer Neg Hx     Irritable bowel syndrome Neg Hx     Hemochromatosis Neg Hx     Cystic fibrosis Neg Hx     Wilmer's disease Neg Hx     Lymphoma Neg Hx     Tuberculosis Neg Hx     Scleroderma Neg Hx     Multiple sclerosis Neg Hx     Melanoma Neg Hx     Lupus Neg Hx     Rheum arthritis Neg Hx     Psoriasis Neg Hx     Pancreatic cancer Neg Hx     Uterine cancer Neg Hx     Ovarian cancer Neg Hx     Bladder Cancer Neg Hx     Kidney cancer Neg Hx      Social History     Tobacco Use    Smoking status: Former Smoker     Quit date: 3/1/1998     Years since quittin.0    Smokeless tobacco: Never Used   Substance  Use Topics    Alcohol use: Yes     Alcohol/week: 5.0 standard drinks     Types: 5 Glasses of wine per week     Comment: 2-3 drinks on weekend days, 5 drinks per week    Drug use: Yes       PTA Medications   Medication Sig    famotidine (PEPCID) 20 MG tablet Take 1 tablet (20 mg total) by mouth every 12 (twelve) hours.    traMADoL (ULTRAM) 50 mg tablet Take 1 tablet (50 mg total) by mouth nightly as needed for Pain.    cholecalciferol, vitamin D3, (VITAMIN D3) 50 mcg (2,000 unit) Cap Take 2 capsules (4,000 Units total) by mouth once daily.    ergocalciferol (ERGOCALCIFEROL) 50,000 unit Cap Take 1 capsule (50,000 Units total) by mouth every 7 days. for 12 doses     Review of patient's allergies indicates:   Allergen Reactions    Adhes. band-tape-benzalkonium     Adhesive tape-silicones     Aspirin      Hurts stomach    Benadryl itch relief Nausea Only and Palpitations        Review of Systems   Constitutional: Negative for fever.   Respiratory: Negative for shortness of breath.    Cardiovascular: Negative for chest pain.   Gastrointestinal: Positive for heartburn.       Objective:      Vital Signs (Most Recent)  Temp: 97.7 °F (36.5 °C) (03/28/22 0808)  Pulse: 61 (03/28/22 0808)  Resp: 18 (03/28/22 0808)  BP: (!) 140/78 (03/28/22 0808)  SpO2: 99 % (03/28/22 0808)    Vital Signs Range (Last 24H):  Temp:  [97.7 °F (36.5 °C)]   Pulse:  [61]   Resp:  [18]   BP: (140)/(78)   SpO2:  [99 %]     Physical Exam  Cardiovascular:      Rate and Rhythm: Normal rate.   Pulmonary:      Effort: Pulmonary effort is normal.   Neurological:      Mental Status: He is alert and oriented to person, place, and time.             Assessment:      There are no hospital problems to display for this patient.      Plan:    EGD for GERD and dyspepsia.

## 2022-03-28 NOTE — PROVATION PATIENT INSTRUCTIONS
Discharge Summary/Instructions after an Endoscopic Procedure  Patient Name: Gee Montez  Patient MRN: [id number]  Patient YOB: 1965 Monday, March 28, 2022  Orlando Mckenzie MD  Dear patient,  As a result of recent federal legislation (The Federal Cures Act), you may   receive lab or pathology results from your procedure in your [facility]   account before your physician is able to contact you. Your physician or   their representative will relay the results to you with their   recommendations at their soonest availability.  Thank you,  RESTRICTIONS:  During your procedure today, you received medications for sedation.  These   medications may affect your judgment, balance and coordination.  Therefore,   for 24 hours, you have the following restrictions:   - DO NOT drive a car, operate machinery, make legal/financial decisions,   sign important papers or drink alcohol.    ACTIVITY:  Today: no heavy lifting, straining or running due to procedural   sedation/anesthesia.  The following day: return to full activity including work.  DIET:  Eat and drink normally unless instructed otherwise.     TREATMENT FOR COMMON SIDE EFFECTS:  - Mild abdominal pain, nausea, belching, bloating or excessive gas:  rest,   eat lightly and use a heating pad.  - Sore Throat: treat with throat lozenges and/or gargle with warm salt   water.  - Because air was used during the procedure, expelling large amounts of air   from your rectum or belching is normal.  - If a bowel prep was taken, you may not have a bowel movement for 1-3 days.    This is normal.  SYMPTOMS TO WATCH FOR AND REPORT TO YOUR PHYSICIAN:  1. Abdominal pain or bloating, other than gas cramps.  2. Chest pain.  3. Back pain.  4. Signs of infection such as: chills or fever occurring within 24 hours   after the procedure.  5. Rectal bleeding, which would show as bright red, maroon, or black stools.   (A tablespoon of blood from the rectum is not serious, especially  if   hemorrhoids are present.)  6. Vomiting.  7. Weakness or dizziness.  GO DIRECTLY TO THE NEAREST EMERGENCY ROOM IF YOU HAVE ANY OF THE FOLLOWING:      Difficulty breathing              Chills and/or fever over 101 F   Persistent vomiting and/or vomiting blood   Severe abdominal pain   Severe chest pain   Black, tarry stools   Bleeding- more than one tablespoon   Any other symptom or condition that you feel may need urgent attention  Your doctor recommends these additional instructions:  If any biopsies were taken, your doctors clinic will contact you in 1 to 2   weeks with any results.  - Discharge patient to home.   - Follow an antireflux regimen indefinitely.   - Await pathology results.   - Telephone endoscopist for pathology results in 3 weeks.   - Return to GI clinic at the next available appointment.   - The findings and recommendations were discussed with the patient.  For questions, problems or results please call your physician - Orlando Mckenzie MD at Work:  [phone].  [facility], EMERGENCY ROOM PHONE NUMBER: [phone]  IF A COMPLICATION OR EMERGENCY SITUATION ARISES AND YOU ARE UNABLE TO REACH   YOUR PHYSICIAN - GO DIRECTLY TO THE EMERGENCY ROOM.  Orlando Mckenzie MD  3/28/2022 9:27:38 AM  This report has been verified and signed electronically.  Dear patient,  As a result of recent federal legislation (The Federal Cures Act), you may   receive lab or pathology results from your procedure in your [facility]   account before your physician is able to contact you. Your physician or   their representative will relay the results to you with their   recommendations at their soonest availability.  Thank you,  PROVATION

## 2022-03-28 NOTE — TRANSFER OF CARE
"Anesthesia Transfer of Care Note    Patient: Gee Montez    Procedure(s) Performed: Procedure(s) (LRB):  EGD (ESOPHAGOGASTRODUODENOSCOPY) (N/A)    Patient location: PACU    Anesthesia Type: general    Transport from OR: Transported from OR on room air with adequate spontaneous ventilation    Post pain: adequate analgesia    Post assessment: no apparent anesthetic complications and tolerated procedure well    Post vital signs: stable    Level of consciousness: awake, alert and oriented    Nausea/Vomiting: no nausea/vomiting    Complications: none    Transfer of care protocol was followed      Last vitals:   Visit Vitals  BP (!) 140/78 (BP Location: Left arm, Patient Position: Lying)   Pulse 61   Temp 36.5 °C (97.7 °F) (Temporal)   Resp 18   Ht 5' 10" (1.778 m)   Wt 79.4 kg (175 lb)   SpO2 99%   BMI 25.11 kg/m²     "

## 2022-04-04 LAB
FINAL PATHOLOGIC DIAGNOSIS: NORMAL
Lab: NORMAL

## 2022-04-05 ENCOUNTER — CLINICAL SUPPORT (OUTPATIENT)
Dept: OTHER | Facility: CLINIC | Age: 57
End: 2022-04-05
Payer: COMMERCIAL

## 2022-04-05 DIAGNOSIS — Z00.8 ENCOUNTER FOR OTHER GENERAL EXAMINATION: ICD-10-CM

## 2022-04-06 LAB
GLUCOSE SERPL-MCNC: 111 MG/DL (ref 60–140)
HDLC SERPL-MCNC: 88 MG/DL
POC CHOLESTEROL, LDL (DOCK): 131.43 MG/DL
POC CHOLESTEROL, TOTAL: 230 MG/DL
TRIGL SERPL-MCNC: 62 MG/DL

## 2022-04-10 NOTE — PROGRESS NOTES
"Gee your EGD pathology was benign no evidence of celiac sprue no evidence of H pylori no evidence of eosinophilic esophagitis   [facility] DIAGNOSIS:   A.   DUODENUM, BIOPSY:          -Duodenal mucosa without diagnostic abnormality.          -No villous atrophy or increased intraepithelial lymphocytes   identified.          -No increased eosinophils identified.   B.   STOMACH, BIOPSIES:          -Mild chemical/reactive gastropathy.          -No Helicobacter pylori organisms identified by H&E stain.          -No increased eosinophils identified.   C.   ESOPHAGUS, DISTAL, BIOPSIES:          -Squamous mucosa with histologic features suggestive of reflux   esophagitis (up to one(1) eosinophil per high power field, max count).          -No intestinal metaplasia identified.   D.   ESOPHAGUS, PROXIMAL, BIOPSIES:          -Squamous mucosa without diagnostic abnormality.          -No eosinophils identified.          -No intestinal metaplasia identified.   Roma Saavedra M.D.   Report attached.   Performing site:   [place] [address]   "Disclaimer:  This case diagnosis was rendered completely by the outside   consultation pathologist and the case is electronically signed by an [facility]   pathologist listed below solely to release the report into the medical   record."   Comment: Interp By JUAN R Garza M.D., Signed on 04/04/2022    "

## 2022-05-05 VITALS
HEIGHT: 70 IN | SYSTOLIC BLOOD PRESSURE: 154 MMHG | WEIGHT: 178 LBS | DIASTOLIC BLOOD PRESSURE: 84 MMHG | BODY MASS INDEX: 25.48 KG/M2

## 2022-05-11 ENCOUNTER — OFFICE VISIT (OUTPATIENT)
Dept: INTERNAL MEDICINE | Facility: CLINIC | Age: 57
End: 2022-05-11
Payer: COMMERCIAL

## 2022-05-11 VITALS
WEIGHT: 178.56 LBS | RESPIRATION RATE: 16 BRPM | SYSTOLIC BLOOD PRESSURE: 132 MMHG | OXYGEN SATURATION: 96 % | TEMPERATURE: 97 F | BODY MASS INDEX: 25.56 KG/M2 | HEIGHT: 70 IN | HEART RATE: 72 BPM | DIASTOLIC BLOOD PRESSURE: 80 MMHG

## 2022-05-11 DIAGNOSIS — K21.00 GASTROESOPHAGEAL REFLUX DISEASE WITH ESOPHAGITIS WITHOUT HEMORRHAGE: ICD-10-CM

## 2022-05-11 DIAGNOSIS — I10 ESSENTIAL HYPERTENSION: ICD-10-CM

## 2022-05-11 DIAGNOSIS — E78.2 MIXED HYPERLIPIDEMIA: ICD-10-CM

## 2022-05-11 DIAGNOSIS — R51.9 NONINTRACTABLE EPISODIC HEADACHE, UNSPECIFIED HEADACHE TYPE: ICD-10-CM

## 2022-05-11 DIAGNOSIS — M54.50 CHRONIC BILATERAL LOW BACK PAIN WITHOUT SCIATICA: ICD-10-CM

## 2022-05-11 DIAGNOSIS — R73.03 PREDIABETES: ICD-10-CM

## 2022-05-11 DIAGNOSIS — G89.29 CHRONIC BILATERAL LOW BACK PAIN WITHOUT SCIATICA: ICD-10-CM

## 2022-05-11 DIAGNOSIS — R42 DIZZINESS AND GIDDINESS: ICD-10-CM

## 2022-05-11 DIAGNOSIS — Z00.00 VISIT FOR ANNUAL HEALTH EXAMINATION: Primary | ICD-10-CM

## 2022-05-11 PROCEDURE — 99999 PR PBB SHADOW E&M-EST. PATIENT-LVL IV: ICD-10-PCS | Mod: PBBFAC,,, | Performed by: INTERNAL MEDICINE

## 2022-05-11 PROCEDURE — 3075F SYST BP GE 130 - 139MM HG: CPT | Mod: CPTII,S$GLB,, | Performed by: INTERNAL MEDICINE

## 2022-05-11 PROCEDURE — 99999 PR PBB SHADOW E&M-EST. PATIENT-LVL IV: CPT | Mod: PBBFAC,,, | Performed by: INTERNAL MEDICINE

## 2022-05-11 PROCEDURE — 3079F DIAST BP 80-89 MM HG: CPT | Mod: CPTII,S$GLB,, | Performed by: INTERNAL MEDICINE

## 2022-05-11 PROCEDURE — 1159F PR MEDICATION LIST DOCUMENTED IN MEDICAL RECORD: ICD-10-PCS | Mod: CPTII,S$GLB,, | Performed by: INTERNAL MEDICINE

## 2022-05-11 PROCEDURE — 1159F MED LIST DOCD IN RCRD: CPT | Mod: CPTII,S$GLB,, | Performed by: INTERNAL MEDICINE

## 2022-05-11 PROCEDURE — 3075F PR MOST RECENT SYSTOLIC BLOOD PRESS GE 130-139MM HG: ICD-10-PCS | Mod: CPTII,S$GLB,, | Performed by: INTERNAL MEDICINE

## 2022-05-11 PROCEDURE — 3079F PR MOST RECENT DIASTOLIC BLOOD PRESSURE 80-89 MM HG: ICD-10-PCS | Mod: CPTII,S$GLB,, | Performed by: INTERNAL MEDICINE

## 2022-05-11 PROCEDURE — 99396 PREV VISIT EST AGE 40-64: CPT | Mod: S$GLB,,, | Performed by: INTERNAL MEDICINE

## 2022-05-11 PROCEDURE — 99396 PR PREVENTIVE VISIT,EST,40-64: ICD-10-PCS | Mod: S$GLB,,, | Performed by: INTERNAL MEDICINE

## 2022-05-11 PROCEDURE — 3008F PR BODY MASS INDEX (BMI) DOCUMENTED: ICD-10-PCS | Mod: CPTII,S$GLB,, | Performed by: INTERNAL MEDICINE

## 2022-05-11 PROCEDURE — 1160F PR REVIEW ALL MEDS BY PRESCRIBER/CLIN PHARMACIST DOCUMENTED: ICD-10-PCS | Mod: CPTII,S$GLB,, | Performed by: INTERNAL MEDICINE

## 2022-05-11 PROCEDURE — 3008F BODY MASS INDEX DOCD: CPT | Mod: CPTII,S$GLB,, | Performed by: INTERNAL MEDICINE

## 2022-05-11 PROCEDURE — 1160F RVW MEDS BY RX/DR IN RCRD: CPT | Mod: CPTII,S$GLB,, | Performed by: INTERNAL MEDICINE

## 2022-05-11 RX ORDER — TRAMADOL HYDROCHLORIDE 50 MG/1
50 TABLET ORAL NIGHTLY PRN
Qty: 30 TABLET | Refills: 2 | Status: SHIPPED | OUTPATIENT
Start: 2022-05-11 | End: 2022-11-04

## 2022-07-08 ENCOUNTER — PATIENT MESSAGE (OUTPATIENT)
Dept: INTERNAL MEDICINE | Facility: CLINIC | Age: 57
End: 2022-07-08
Payer: COMMERCIAL

## 2022-07-11 ENCOUNTER — HOSPITAL ENCOUNTER (OUTPATIENT)
Dept: RADIOLOGY | Facility: HOSPITAL | Age: 57
Discharge: HOME | End: 2022-07-11
Attending: INTERNAL MEDICINE
Payer: COMMERCIAL

## 2022-07-11 DIAGNOSIS — R42 DIZZINESS AND GIDDINESS: ICD-10-CM

## 2022-07-11 DIAGNOSIS — R51.9 NONINTRACTABLE EPISODIC HEADACHE, UNSPECIFIED HEADACHE TYPE: ICD-10-CM

## 2022-07-11 PROCEDURE — 70496 CT ANGIOGRAPHY HEAD: CPT | Mod: TC

## 2022-07-11 PROCEDURE — 70496 CT ANGIOGRAPHY HEAD: CPT | Mod: 26,,, | Performed by: RADIOLOGY

## 2022-07-11 PROCEDURE — 70498 CT ANGIOGRAPHY NECK: CPT | Mod: 26,,, | Performed by: RADIOLOGY

## 2022-07-11 PROCEDURE — 25500020 PHARM REV CODE 255: Performed by: INTERNAL MEDICINE

## 2022-07-11 PROCEDURE — 70496 CTA HEAD AND NECK (XPD): ICD-10-PCS | Mod: 26,,, | Performed by: RADIOLOGY

## 2022-07-11 PROCEDURE — 70498 CTA HEAD AND NECK (XPD): ICD-10-PCS | Mod: 26,,, | Performed by: RADIOLOGY

## 2022-07-11 RX ADMIN — IOHEXOL 75 ML: 350 INJECTION, SOLUTION INTRAVENOUS at 07:07

## 2022-09-05 DIAGNOSIS — E55.9 VITAMIN D INSUFFICIENCY: ICD-10-CM

## 2022-09-05 DIAGNOSIS — K21.9 GASTROESOPHAGEAL REFLUX DISEASE, UNSPECIFIED WHETHER ESOPHAGITIS PRESENT: Primary | ICD-10-CM

## 2022-09-06 ENCOUNTER — PATIENT MESSAGE (OUTPATIENT)
Dept: GASTROENTEROLOGY | Facility: CLINIC | Age: 57
End: 2022-09-06
Payer: COMMERCIAL

## 2022-09-13 ENCOUNTER — TELEPHONE (OUTPATIENT)
Dept: INTERNAL MEDICINE | Facility: CLINIC | Age: 57
End: 2022-09-13
Payer: COMMERCIAL

## 2022-09-13 ENCOUNTER — PATIENT MESSAGE (OUTPATIENT)
Dept: INTERNAL MEDICINE | Facility: CLINIC | Age: 57
End: 2022-09-13
Payer: COMMERCIAL

## 2022-09-13 DIAGNOSIS — Z12.5 SCREENING PSA (PROSTATE SPECIFIC ANTIGEN): Primary | ICD-10-CM

## 2022-09-13 NOTE — TELEPHONE ENCOUNTER
Spoke with patient to link his PSA to lab that is already scheduled. Patient asked that we reschedule him to a later appointment for the date and earlier for the time. -Adelita BRANDON MA

## 2022-09-21 ENCOUNTER — LAB VISIT (OUTPATIENT)
Dept: LAB | Facility: HOSPITAL | Age: 57
End: 2022-09-21
Attending: INTERNAL MEDICINE
Payer: COMMERCIAL

## 2022-09-21 DIAGNOSIS — K21.9 GASTROESOPHAGEAL REFLUX DISEASE, UNSPECIFIED WHETHER ESOPHAGITIS PRESENT: ICD-10-CM

## 2022-09-21 DIAGNOSIS — Z12.5 SCREENING PSA (PROSTATE SPECIFIC ANTIGEN): ICD-10-CM

## 2022-09-21 DIAGNOSIS — E55.9 VITAMIN D INSUFFICIENCY: ICD-10-CM

## 2022-09-21 LAB
25(OH)D3+25(OH)D2 SERPL-MCNC: 31 NG/ML (ref 30–96)
25(OH)D3+25(OH)D2 SERPL-MCNC: 31 NG/ML (ref 30–96)
ALBUMIN SERPL BCP-MCNC: 4.3 G/DL (ref 3.5–5.2)
ALP SERPL-CCNC: 43 U/L (ref 55–135)
ALT SERPL W/O P-5'-P-CCNC: 18 U/L (ref 10–44)
ANION GAP SERPL CALC-SCNC: 6 MMOL/L (ref 8–16)
AST SERPL-CCNC: 19 U/L (ref 10–40)
BILIRUB SERPL-MCNC: 0.4 MG/DL (ref 0.1–1)
BUN SERPL-MCNC: 17 MG/DL (ref 6–20)
CALCIUM SERPL-MCNC: 9.4 MG/DL (ref 8.7–10.5)
CHLORIDE SERPL-SCNC: 105 MMOL/L (ref 95–110)
CO2 SERPL-SCNC: 31 MMOL/L (ref 23–29)
COMPLEXED PSA SERPL-MCNC: 0.53 NG/ML (ref 0–4)
CREAT SERPL-MCNC: 0.8 MG/DL (ref 0.5–1.4)
EST. GFR  (NO RACE VARIABLE): >60 ML/MIN/1.73 M^2
GLUCOSE SERPL-MCNC: 89 MG/DL (ref 70–110)
MAGNESIUM SERPL-MCNC: 2.2 MG/DL (ref 1.6–2.6)
POTASSIUM SERPL-SCNC: 4.3 MMOL/L (ref 3.5–5.1)
PROT SERPL-MCNC: 6.9 G/DL (ref 6–8.4)
SODIUM SERPL-SCNC: 142 MMOL/L (ref 136–145)
VIT B12 SERPL-MCNC: 393 PG/ML (ref 210–950)

## 2022-09-21 PROCEDURE — 84153 ASSAY OF PSA TOTAL: CPT | Performed by: INTERNAL MEDICINE

## 2022-09-21 PROCEDURE — 36415 COLL VENOUS BLD VENIPUNCTURE: CPT | Performed by: INTERNAL MEDICINE

## 2022-09-21 PROCEDURE — 83735 ASSAY OF MAGNESIUM: CPT | Performed by: INTERNAL MEDICINE

## 2022-09-21 PROCEDURE — 82607 VITAMIN B-12: CPT | Performed by: INTERNAL MEDICINE

## 2022-09-21 PROCEDURE — 82306 VITAMIN D 25 HYDROXY: CPT | Performed by: INTERNAL MEDICINE

## 2022-09-21 PROCEDURE — 80053 COMPREHEN METABOLIC PANEL: CPT | Performed by: INTERNAL MEDICINE

## 2022-11-30 ENCOUNTER — OFFICE VISIT (OUTPATIENT)
Dept: INTERNAL MEDICINE | Facility: CLINIC | Age: 57
End: 2022-11-30
Payer: COMMERCIAL

## 2022-11-30 VITALS
WEIGHT: 184.06 LBS | SYSTOLIC BLOOD PRESSURE: 124 MMHG | BODY MASS INDEX: 26.35 KG/M2 | HEIGHT: 70 IN | HEART RATE: 63 BPM | OXYGEN SATURATION: 98 % | DIASTOLIC BLOOD PRESSURE: 80 MMHG

## 2022-11-30 DIAGNOSIS — R73.03 PREDIABETES: ICD-10-CM

## 2022-11-30 DIAGNOSIS — E78.2 MIXED HYPERLIPIDEMIA: ICD-10-CM

## 2022-11-30 DIAGNOSIS — R10.9 RIGHT FLANK PAIN: ICD-10-CM

## 2022-11-30 DIAGNOSIS — I10 ESSENTIAL HYPERTENSION: Primary | ICD-10-CM

## 2022-11-30 DIAGNOSIS — K21.00 GASTROESOPHAGEAL REFLUX DISEASE WITH ESOPHAGITIS WITHOUT HEMORRHAGE: ICD-10-CM

## 2022-11-30 DIAGNOSIS — Z87.442 HISTORY OF KIDNEY STONES: ICD-10-CM

## 2022-11-30 DIAGNOSIS — M54.50 CHRONIC BILATERAL LOW BACK PAIN WITHOUT SCIATICA: ICD-10-CM

## 2022-11-30 DIAGNOSIS — G89.29 CHRONIC BILATERAL LOW BACK PAIN WITHOUT SCIATICA: ICD-10-CM

## 2022-11-30 DIAGNOSIS — I49.3 FREQUENT PVCS: ICD-10-CM

## 2022-11-30 PROCEDURE — 1160F RVW MEDS BY RX/DR IN RCRD: CPT | Mod: CPTII,S$GLB,, | Performed by: INTERNAL MEDICINE

## 2022-11-30 PROCEDURE — 99999 PR PBB SHADOW E&M-EST. PATIENT-LVL IV: ICD-10-PCS | Mod: PBBFAC,,, | Performed by: INTERNAL MEDICINE

## 2022-11-30 PROCEDURE — 99999 PR PBB SHADOW E&M-EST. PATIENT-LVL IV: CPT | Mod: PBBFAC,,, | Performed by: INTERNAL MEDICINE

## 2022-11-30 PROCEDURE — 3074F PR MOST RECENT SYSTOLIC BLOOD PRESSURE < 130 MM HG: ICD-10-PCS | Mod: CPTII,S$GLB,, | Performed by: INTERNAL MEDICINE

## 2022-11-30 PROCEDURE — 3008F PR BODY MASS INDEX (BMI) DOCUMENTED: ICD-10-PCS | Mod: CPTII,S$GLB,, | Performed by: INTERNAL MEDICINE

## 2022-11-30 PROCEDURE — 3079F PR MOST RECENT DIASTOLIC BLOOD PRESSURE 80-89 MM HG: ICD-10-PCS | Mod: CPTII,S$GLB,, | Performed by: INTERNAL MEDICINE

## 2022-11-30 PROCEDURE — 99214 OFFICE O/P EST MOD 30 MIN: CPT | Mod: S$GLB,,, | Performed by: INTERNAL MEDICINE

## 2022-11-30 PROCEDURE — 3079F DIAST BP 80-89 MM HG: CPT | Mod: CPTII,S$GLB,, | Performed by: INTERNAL MEDICINE

## 2022-11-30 PROCEDURE — 1159F PR MEDICATION LIST DOCUMENTED IN MEDICAL RECORD: ICD-10-PCS | Mod: CPTII,S$GLB,, | Performed by: INTERNAL MEDICINE

## 2022-11-30 PROCEDURE — 3008F BODY MASS INDEX DOCD: CPT | Mod: CPTII,S$GLB,, | Performed by: INTERNAL MEDICINE

## 2022-11-30 PROCEDURE — 1159F MED LIST DOCD IN RCRD: CPT | Mod: CPTII,S$GLB,, | Performed by: INTERNAL MEDICINE

## 2022-11-30 PROCEDURE — 3074F SYST BP LT 130 MM HG: CPT | Mod: CPTII,S$GLB,, | Performed by: INTERNAL MEDICINE

## 2022-11-30 PROCEDURE — 99214 PR OFFICE/OUTPT VISIT, EST, LEVL IV, 30-39 MIN: ICD-10-PCS | Mod: S$GLB,,, | Performed by: INTERNAL MEDICINE

## 2022-11-30 PROCEDURE — 1160F PR REVIEW ALL MEDS BY PRESCRIBER/CLIN PHARMACIST DOCUMENTED: ICD-10-PCS | Mod: CPTII,S$GLB,, | Performed by: INTERNAL MEDICINE

## 2022-11-30 RX ORDER — TRAMADOL HYDROCHLORIDE 50 MG/1
TABLET ORAL
Qty: 30 TABLET | Refills: 1 | Status: SHIPPED | OUTPATIENT
Start: 2022-11-30 | End: 2023-05-15 | Stop reason: SDUPTHER

## 2022-11-30 NOTE — PROGRESS NOTES
INTERNAL MEDICINE ESTABLISHED PATIENT VISIT NOTE    Subjective:     Chief Complaint: Follow-up  HLD     Patient ID: Gee Montez is a 57 y.o. male with HLD, preDM now resolved, ?hx PVCs on bystolic, chronic neck and back pain, hx kidney stones, hx palpitations (seen by Cards c neg w/u including EKG, echo, and Holter), GERD, Vit D def, last seen by me in May for his annual exam, here today for f/u cholesterol and to discuss lab results.    Has gained 6 lbs since last visit.  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      Past Medical History:  Past Medical History:   Diagnosis Date    Chronic neck pain     Frequent PVCs     Hyperlipidemia        Home Medications:  Prior to Admission medications    Medication Sig Start Date End Date Taking? Authorizing Provider   cholecalciferol, vitamin D3, (VITAMIN D3) 50 mcg (2,000 unit) Cap Take 2 capsules (4,000 Units total) by mouth once daily. 3/20/22 3/20/23 Yes Orlando Mckenzie MD   famotidine (PEPCID) 20 MG tablet TAKE 1 TABLET (20 MG TOTAL) BY MOUTH EVERY 12 (TWELVE) HOURS. 22 Yes Orlando Mckenzie MD   traMADoL (ULTRAM) 50 mg tablet TAKE 1 TABLET BY MOUTH NIGHTLY AS NEEDED FOR PAIN. 22  Yes Taras Dasilva MD       Allergies:  Review of patient's allergies indicates:   Allergen Reactions    Adhes. band-tape-benzalkonium     Adhesive tape-silicones     Aspirin      Hurts stomach    Benadryl itch relief Nausea Only and Palpitations       Social History:  Social History     Tobacco Use    Smoking status: Former     Types: Cigarettes     Quit date: 3/1/1998     Years since quittin.7    Smokeless tobacco: Never   Substance Use Topics    Alcohol use: Yes     Alcohol/week: 5.0 standard drinks     Types: 5 Glasses of wine per week     Comment: 2-3 drinks on weekend days, 5 drinks per week    Drug use: Yes        Review of Systems   Constitutional:  Negative for chills, fatigue and fever.   Respiratory:  Negative for cough, chest tightness and shortness of breath.  "   Cardiovascular:  Negative for chest pain.   Gastrointestinal:  Negative for abdominal pain and blood in stool.   Genitourinary:  Negative for dysuria and frequency.       Health Maintenance:     Immunizations:   Influenza 10/2022  TDap 7/2017  Prevnar 13 rec at 64  Shingrix rec now, pt given rx card again today to complete Friday.  COVID vaccines completed 3/2021, 4/2021, rec booster now (Pfizer), can schedule on portal     Cancer Screening:  Colonoscopy:  3/2020 c hemorrhoids s/p banding, no polyps, rec repeat in 10 yrs  PSA 9/2022 wnl    Objective:   /80 (BP Location: Right arm, Patient Position: Sitting, BP Method: Medium (Manual))   Pulse 63   Ht 5' 10" (1.778 m)   Wt 83.5 kg (184 lb 1.4 oz)   SpO2 98%   BMI 26.41 kg/m²        General: AAO x3, no apparent distress  HEENT: PERRL, OP clear  CV: RRR, no m/r/g  Pulm: Lungs CTAB, no crackles, no wheezes  Abd: s/NT/ND +BS  Extremities: no c/c/e    Labs:     Lab Results   Component Value Date    WBC 4.88 11/03/2021    HGB 15.9 11/03/2021    HCT 49.0 11/03/2021    MCV 93 11/03/2021     11/03/2021     Sodium   Date Value Ref Range Status   09/21/2022 142 136 - 145 mmol/L Final     Potassium   Date Value Ref Range Status   09/21/2022 4.3 3.5 - 5.1 mmol/L Final     Chloride   Date Value Ref Range Status   09/21/2022 105 95 - 110 mmol/L Final     CO2   Date Value Ref Range Status   09/21/2022 31 (H) 23 - 29 mmol/L Final     Glucose   Date Value Ref Range Status   09/21/2022 89 70 - 110 mg/dL Final     BUN   Date Value Ref Range Status   09/21/2022 17 6 - 20 mg/dL Final     Creatinine   Date Value Ref Range Status   09/21/2022 0.8 0.5 - 1.4 mg/dL Final     Calcium   Date Value Ref Range Status   09/21/2022 9.4 8.7 - 10.5 mg/dL Final     Total Protein   Date Value Ref Range Status   09/21/2022 6.9 6.0 - 8.4 g/dL Final     Albumin   Date Value Ref Range Status   09/21/2022 4.3 3.5 - 5.2 g/dL Final     Total Bilirubin   Date Value Ref Range Status "   09/21/2022 0.4 0.1 - 1.0 mg/dL Final     Comment:     For infants and newborns, interpretation of results should be based  on gestational age, weight and in agreement with clinical  observations.    Premature Infant recommended reference ranges:  Up to 24 hours.............<8.0 mg/dL  Up to 48 hours............<12.0 mg/dL  3-5 days..................<15.0 mg/dL  6-29 days.................<15.0 mg/dL       Alkaline Phosphatase   Date Value Ref Range Status   09/21/2022 43 (L) 55 - 135 U/L Final     AST   Date Value Ref Range Status   09/21/2022 19 10 - 40 U/L Final     ALT   Date Value Ref Range Status   09/21/2022 18 10 - 44 U/L Final     Anion Gap   Date Value Ref Range Status   09/21/2022 6 (L) 8 - 16 mmol/L Final     eGFR if    Date Value Ref Range Status   03/18/2022 >60.0 >60 mL/min/1.73 m^2 Final     eGFR if non    Date Value Ref Range Status   03/18/2022 >60.0 >60 mL/min/1.73 m^2 Final     Comment:     Calculation used to obtain the estimated glomerular filtration  rate (eGFR) is the CKD-EPI equation.        Lab Results   Component Value Date    HGBA1C 5.4 11/03/2021     Lab Results   Component Value Date    LDLCALC 120.0 11/03/2021     Lab Results   Component Value Date    TSH 1.819 11/03/2021         Assessment/Plan     Gee was seen today for follow-up.    Diagnoses and all orders for this visit:    Essential hypertension  Diet controlled  No acute issues  Cont lifestyle modifications.    Mixed hyperlipidemia  Lab Results   Component Value Date    LDLCALC 120.0 11/03/2021     Diet controlled  Cont low fat diet.  -     Lipid Panel; Future    Frequent PVCs  No acute issues  Holter completed in the past, SR noted on holter when pt reported sx.  Okay to monitor for now.    Prediabetes  Lab Results   Component Value Date    HGBA1C 5.4 11/03/2021     Resolved on last check c previous wt loss  Pt was counseled on the need to avoid intake of simple sugars and minimize  carbohydrates.  Also recommended weight loss and daily exercise.  -     Hemoglobin A1C; Future  -     CBC Auto Differential; Future    Gastroesophageal reflux disease with esophagitis without hemorrhage  Stable  No issues    Chronic bilateral low back pain without sciatica  30 pills with 2 refills to last 6 mos, received 30 pills this past mo from covering provider so will given 30 with 1 refill to last until his 6 mo f/u c me.  -     traMADoL (ULTRAM) 50 mg tablet; TAKE 1 TABLET BY MOUTH NIGHTLY AS NEEDED FOR PAIN.    Right flank pain  Hx kidney stones on previous u/s and requesting repeat u/s since intermittent sx   -     US Abdomen Limited_Kidney; Future  -     Ambulatory referral/consult to Urology; Future    History of kidney stones  -     Ambulatory referral/consult to Urology; Future      HM as above  RTC in 6 mos, sooner if needed.  Fasting labs on Friday.    Celina Myles MD  Department of Internal Medicine - [facility]  11/30/2022

## 2022-11-30 NOTE — PATIENT INSTRUCTIONS
Please get your Shingles vaccine on Friday at the pharmacy at [facility]

## 2022-12-02 ENCOUNTER — LAB VISIT (OUTPATIENT)
Dept: LAB | Facility: HOSPITAL | Age: 57
End: 2022-12-02
Attending: INTERNAL MEDICINE
Payer: COMMERCIAL

## 2022-12-02 DIAGNOSIS — E78.2 MIXED HYPERLIPIDEMIA: ICD-10-CM

## 2022-12-02 DIAGNOSIS — R73.03 PREDIABETES: ICD-10-CM

## 2022-12-02 LAB
BASOPHILS # BLD AUTO: 0.03 K/UL (ref 0–0.2)
BASOPHILS NFR BLD: 0.6 % (ref 0–1.9)
CHOLEST SERPL-MCNC: 227 MG/DL (ref 120–199)
CHOLEST/HDLC SERPL: 2.7 {RATIO} (ref 2–5)
DIFFERENTIAL METHOD: ABNORMAL
EOSINOPHIL # BLD AUTO: 0.2 K/UL (ref 0–0.5)
EOSINOPHIL NFR BLD: 2.9 % (ref 0–8)
ERYTHROCYTE [DISTWIDTH] IN BLOOD BY AUTOMATED COUNT: 13.6 % (ref 11.5–14.5)
ESTIMATED AVG GLUCOSE: 108 MG/DL (ref 68–131)
HBA1C MFR BLD: 5.4 % (ref 4–5.6)
HCT VFR BLD AUTO: 50.3 % (ref 40–54)
HDLC SERPL-MCNC: 84 MG/DL (ref 40–75)
HDLC SERPL: 37 % (ref 20–50)
HGB BLD-MCNC: 15.7 G/DL (ref 14–18)
IMM GRANULOCYTES # BLD AUTO: 0.01 K/UL (ref 0–0.04)
IMM GRANULOCYTES NFR BLD AUTO: 0.2 % (ref 0–0.5)
LDLC SERPL CALC-MCNC: 129.4 MG/DL (ref 63–159)
LYMPHOCYTES # BLD AUTO: 1.4 K/UL (ref 1–4.8)
LYMPHOCYTES NFR BLD: 27.3 % (ref 18–48)
MCH RBC QN AUTO: 29 PG (ref 27–31)
MCHC RBC AUTO-ENTMCNC: 31.2 G/DL (ref 32–36)
MCV RBC AUTO: 93 FL (ref 82–98)
MONOCYTES # BLD AUTO: 0.6 K/UL (ref 0.3–1)
MONOCYTES NFR BLD: 12.2 % (ref 4–15)
NEUTROPHILS # BLD AUTO: 3 K/UL (ref 1.8–7.7)
NEUTROPHILS NFR BLD: 56.8 % (ref 38–73)
NONHDLC SERPL-MCNC: 143 MG/DL
NRBC BLD-RTO: 0 /100 WBC
PLATELET # BLD AUTO: 200 K/UL (ref 150–450)
PMV BLD AUTO: 10.5 FL (ref 9.2–12.9)
RBC # BLD AUTO: 5.41 M/UL (ref 4.6–6.2)
TRIGL SERPL-MCNC: 68 MG/DL (ref 30–150)
WBC # BLD AUTO: 5.24 K/UL (ref 3.9–12.7)

## 2022-12-02 PROCEDURE — 85025 COMPLETE CBC W/AUTO DIFF WBC: CPT | Performed by: INTERNAL MEDICINE

## 2022-12-02 PROCEDURE — 83036 HEMOGLOBIN GLYCOSYLATED A1C: CPT | Performed by: INTERNAL MEDICINE

## 2022-12-02 PROCEDURE — 80061 LIPID PANEL: CPT | Performed by: INTERNAL MEDICINE

## 2022-12-02 PROCEDURE — 36415 COLL VENOUS BLD VENIPUNCTURE: CPT | Performed by: INTERNAL MEDICINE

## 2022-12-23 ENCOUNTER — OFFICE VISIT (OUTPATIENT)
Dept: URGENT CARE | Facility: CLINIC | Age: 57
End: 2022-12-23
Payer: COMMERCIAL

## 2022-12-23 VITALS
TEMPERATURE: 98 F | RESPIRATION RATE: 18 BRPM | WEIGHT: 180 LBS | BODY MASS INDEX: 25.77 KG/M2 | HEIGHT: 70 IN | DIASTOLIC BLOOD PRESSURE: 87 MMHG | HEART RATE: 61 BPM | OXYGEN SATURATION: 97 % | SYSTOLIC BLOOD PRESSURE: 129 MMHG

## 2022-12-23 DIAGNOSIS — R05.9 COUGH, UNSPECIFIED TYPE: ICD-10-CM

## 2022-12-23 DIAGNOSIS — U07.1 COVID: Primary | ICD-10-CM

## 2022-12-23 DIAGNOSIS — U07.1 COVID-19 VIRUS DETECTED: ICD-10-CM

## 2022-12-23 LAB
CTP QC/QA: YES
SARS-COV-2 AG RESP QL IA.RAPID: POSITIVE

## 2022-12-23 PROCEDURE — 99203 OFFICE O/P NEW LOW 30 MIN: CPT | Mod: S$GLB,,,

## 2022-12-23 PROCEDURE — 1160F PR REVIEW ALL MEDS BY PRESCRIBER/CLIN PHARMACIST DOCUMENTED: ICD-10-PCS | Mod: CPTII,S$GLB,,

## 2022-12-23 PROCEDURE — 3074F PR MOST RECENT SYSTOLIC BLOOD PRESSURE < 130 MM HG: ICD-10-PCS | Mod: CPTII,S$GLB,,

## 2022-12-23 PROCEDURE — 87811 SARS-COV-2 COVID19 W/OPTIC: CPT | Mod: QW,S$GLB,,

## 2022-12-23 PROCEDURE — 3079F DIAST BP 80-89 MM HG: CPT | Mod: CPTII,S$GLB,,

## 2022-12-23 PROCEDURE — 1159F MED LIST DOCD IN RCRD: CPT | Mod: CPTII,S$GLB,,

## 2022-12-23 PROCEDURE — 1159F PR MEDICATION LIST DOCUMENTED IN MEDICAL RECORD: ICD-10-PCS | Mod: CPTII,S$GLB,,

## 2022-12-23 PROCEDURE — 99203 PR OFFICE/OUTPT VISIT, NEW, LEVL III, 30-44 MIN: ICD-10-PCS | Mod: S$GLB,,,

## 2022-12-23 PROCEDURE — 3074F SYST BP LT 130 MM HG: CPT | Mod: CPTII,S$GLB,,

## 2022-12-23 PROCEDURE — 3008F PR BODY MASS INDEX (BMI) DOCUMENTED: ICD-10-PCS | Mod: CPTII,S$GLB,,

## 2022-12-23 PROCEDURE — 1160F RVW MEDS BY RX/DR IN RCRD: CPT | Mod: CPTII,S$GLB,,

## 2022-12-23 PROCEDURE — 3008F BODY MASS INDEX DOCD: CPT | Mod: CPTII,S$GLB,,

## 2022-12-23 PROCEDURE — 87811 SARS CORONAVIRUS 2 ANTIGEN POCT, MANUAL READ: ICD-10-PCS | Mod: QW,S$GLB,,

## 2022-12-23 PROCEDURE — 3079F PR MOST RECENT DIASTOLIC BLOOD PRESSURE 80-89 MM HG: ICD-10-PCS | Mod: CPTII,S$GLB,,

## 2022-12-23 PROCEDURE — 3044F HG A1C LEVEL LT 7.0%: CPT | Mod: CPTII,S$GLB,,

## 2022-12-23 PROCEDURE — 3044F PR MOST RECENT HEMOGLOBIN A1C LEVEL <7.0%: ICD-10-PCS | Mod: CPTII,S$GLB,,

## 2022-12-23 RX ORDER — BENZONATATE 200 MG/1
200 CAPSULE ORAL 3 TIMES DAILY PRN
Qty: 30 CAPSULE | Refills: 0 | Status: SHIPPED | OUTPATIENT
Start: 2022-12-23 | End: 2023-01-02

## 2022-12-23 NOTE — PATIENT INSTRUCTIONS
Please quarantine from 5 days from the onset of symptoms.     Please return here or go to the Emergency Department for any concerns or worsening of condition.  Please take TESSALON for cough.  Please take PAXLOVID for COVID symptoms.   If you do not have Hypertension or any history of palpitations, it is ok to take over the counter Sudafed or Mucinex D.   If you do have Hypertension or palpitations, it is safe to take Coricidin HBP for relief of sinus symptoms.  We recommend you take over the counter Flonase (Fluticasone) or Intranasal Budesonide or another nasally inhaled steroid unless you are already taking one.  Nasal irrigation with a saline spray or Netti Pot like device per their directions is also recommended.  If not allergic, please take over the counter Tylenol (Acetaminophen) and/or Motrin (Ibuprofen) as directed for control of pain and/or fever.  Please follow up with your primary care doctor or specialist as needed.    If you  smoke, please stop smoking.    Happy Holidays, I HO-HO-Hope you feel better soon!- Stephanie

## 2022-12-23 NOTE — LETTER
[id number] ? [place], [address] ? Phone [phone] ? Fax [phone]           Return to Work/School    Patient: Gee Montez  YOB: 1965   Date: 12/23/2022      To Whom It May Concern:     Gee Montez was in contact with/seen in my office on 12/23/2022. COVID-19 is present in our communities across the state. Not all patients are eligible or appropriate to be tested. In this situation, your employee meets the following criteria:     Gee Montez has met the criteria for COVID-19 testing and has a POSITIVE result. He can return to work once they are asymptomatic for 24 hours without the use of fever reducing medications AND at least five days from the start of symptoms (or from the first positive result if they have no symptoms).      If you have any questions or concerns, or if I can be of further assistance, please do not hesitate to contact me.     Sincerely,    Srikanth Guillen PA-C

## 2022-12-23 NOTE — PROGRESS NOTES
"Subjective:       Patient ID: Gee Montez is a 57 y.o. male.    Vitals:  height is 5' 10" (1.778 m) and weight is 81.6 kg (180 lb). His oral temperature is 97.8 °F (36.6 °C). His blood pressure is 129/87 and his pulse is 61. His respiration is 18 and oxygen saturation is 97%.     Chief Complaint: Otalgia    56 yo male presents to the urgent care with c/o productive cough (yellow/green). Patient states that symptoms started 5 days ago. Associated symptoms include PNS, plugged ear sensation. Patient has tried DayQuil with significant relief. Patient denies fever, CP, SOB, nausea, vomiting, chills.  Pt states that  his sister tested positive for covid.     Ear Fullness   There is pain in the left ear. This is a new problem. The current episode started yesterday. The problem occurs constantly. The problem has been gradually worsening. There has been no fever. The pain is at a severity of 2/10. The pain is mild. Associated symptoms include coughing and rhinorrhea. Pertinent negatives include no diarrhea, ear discharge, headaches, neck pain, rash, sore throat or vomiting. He has tried nothing for the symptoms.     Constitution: Negative for chills, fatigue and fever.   HENT:  Positive for ear pain, congestion and postnasal drip. Negative for ear discharge and sore throat.    Neck: Negative for neck pain.   Cardiovascular:  Negative for chest pain and sob on exertion.   Respiratory:  Positive for cough and sputum production (yellow). Negative for shortness of breath and wheezing.    Gastrointestinal:  Negative for nausea, vomiting and diarrhea.   Musculoskeletal:  Positive for muscle ache.   Skin:  Negative for rash.   Neurological:  Negative for headaches.     Objective:      Physical Exam   Constitutional:  Non-toxic appearance. He does not appear ill. No distress. normal  HENT:   Head: Normocephalic.   Ears:   Right Ear: Tympanic membrane, external ear and ear canal normal. impacted cerumen  Left Ear: Tympanic " membrane, external ear and ear canal normal. impacted cerumen  Nose: Congestion present. No rhinorrhea.   Mouth/Throat: Posterior oropharyngeal erythema present. No oropharyngeal exudate.   Eyes: Conjunctivae are normal. Right eye exhibits no discharge. Left eye exhibits no discharge. No scleral icterus. Extraocular movement intact   Cardiovascular: Normal rate, regular rhythm and normal heart sounds.   No murmur heard.Exam reveals no gallop and no friction rub.   Pulmonary/Chest: Effort normal and breath sounds normal. No stridor. No respiratory distress. He has no wheezes. He has no rhonchi. He has no rales. He exhibits no tenderness.   Abdominal: Normal appearance.   Musculoskeletal:      Cervical back: He exhibits no tenderness.   Lymphadenopathy:     He has cervical adenopathy.   Neurological: He is alert.   Skin: Skin is not diaphoretic and no rash.   Nursing note and vitals reviewed.      Results for orders placed or performed in visit on 12/23/22   SARS Coronavirus 2 Antigen, POCT Manual Read   Result Value Ref Range    SARS Coronavirus 2 Antigen Positive (A) Negative     Acceptable Yes      Assessment:       1. COVID    2. Cough, unspecified type          Plan:     Previous external notes reviewed.  Vital signs reviewed.Within normal limits.  Labs ordered. Labs reviewed. COVID +.  Discussed COVID, home care, tx options, and given follow up precautions.  I have considered potential emergent or life threatening causes of the patient's compliant including but not limited to PNA. Based on the patient's history, exam, and diagnostic findings, I think these diagnoses to be highly unlikely at the present time.   Patient was briefed on my thought process and diagnosis.  Patient involved with the treatment plan and agreed to the plan.  Patient informed on warning signs, patient understood warning signs.  Patient informed to return to the urgent care or go to the ER if warning signs appear.    Patient  Instructions   Please quarantine from 5 days from the onset of symptoms.     Please return here or go to the Emergency Department for any concerns or worsening of condition.  Please take TESSALON for cough.  Please take PAXLOVID for COVID symptoms.   If you do not have Hypertension or any history of palpitations, it is ok to take over the counter Sudafed or Mucinex D.   If you do have Hypertension or palpitations, it is safe to take Coricidin HBP for relief of sinus symptoms.  We recommend you take over the counter Flonase (Fluticasone) or Intranasal Budesonide or another nasally inhaled steroid unless you are already taking one.  Nasal irrigation with a saline spray or Netti Pot like device per their directions is also recommended.  If not allergic, please take over the counter Tylenol (Acetaminophen) and/or Motrin (Ibuprofen) as directed for control of pain and/or fever.  Please follow up with your primary care doctor or specialist as needed.    If you  smoke, please stop smoking.    Happy Holidays, I HO-HO-Hope you feel better soon!- Stephanie       COVID  -     nirmatrelvir-ritonavir 300 mg (150 mg x 2)-100 mg copackaged tablets (EUA); Take 3 tablets by mouth 2 (two) times daily for 5 days. Each dose contains 2 nirmatrelvir (pink tablets) and 1 ritonavir (white tablet). Take all 3 tablets together  Dispense: 30 tablet; Refill: 0    Cough, unspecified type  -     SARS Coronavirus 2 Antigen, POCT Manual Read  -     benzonatate (TESSALON) 200 MG capsule; Take 1 capsule (200 mg total) by mouth 3 (three) times daily as needed for Cough.  Dispense: 30 capsule; Refill: 0      Srikanth Guillen PA-C

## 2023-03-12 ENCOUNTER — HOSPITAL ENCOUNTER (OUTPATIENT)
Dept: RADIOLOGY | Facility: HOSPITAL | Age: 58
Discharge: HOME | End: 2023-03-12
Attending: INTERNAL MEDICINE
Payer: COMMERCIAL

## 2023-03-12 DIAGNOSIS — R10.9 RIGHT FLANK PAIN: ICD-10-CM

## 2023-03-12 PROCEDURE — 76770 US EXAM ABDO BACK WALL COMP: CPT | Mod: 26,,, | Performed by: INTERNAL MEDICINE

## 2023-03-12 PROCEDURE — 76770 US RETROPERITONEAL COMPLETE: ICD-10-PCS | Mod: 26,,, | Performed by: INTERNAL MEDICINE

## 2023-03-12 PROCEDURE — 76770 US EXAM ABDO BACK WALL COMP: CPT | Mod: TC

## 2023-03-13 DIAGNOSIS — N20.0 KIDNEY STONES: Primary | ICD-10-CM

## 2023-05-15 DIAGNOSIS — G89.29 CHRONIC BILATERAL LOW BACK PAIN WITHOUT SCIATICA: ICD-10-CM

## 2023-05-15 DIAGNOSIS — M54.50 CHRONIC BILATERAL LOW BACK PAIN WITHOUT SCIATICA: ICD-10-CM

## 2023-05-15 NOTE — TELEPHONE ENCOUNTER
No care due was identified.  [facility] Embedded Care Due Messages. Reference number: [id number].   5/15/2023 6:25:25 PM CDT

## 2023-05-16 RX ORDER — TRAMADOL HYDROCHLORIDE 50 MG/1
TABLET ORAL
Qty: 30 TABLET | Refills: 1 | Status: SHIPPED | OUTPATIENT
Start: 2023-05-16 | End: 2023-10-24 | Stop reason: SDUPTHER

## 2023-08-11 ENCOUNTER — PATIENT MESSAGE (OUTPATIENT)
Dept: RESEARCH | Facility: HOSPITAL | Age: 58
End: 2023-08-11
Payer: COMMERCIAL

## 2023-10-03 ENCOUNTER — HOSPITAL ENCOUNTER (EMERGENCY)
Facility: OTHER | Age: 58
Discharge: HOME | End: 2023-10-04
Attending: EMERGENCY MEDICINE
Payer: COMMERCIAL

## 2023-10-03 VITALS
OXYGEN SATURATION: 97 % | HEART RATE: 83 BPM | RESPIRATION RATE: 17 BRPM | BODY MASS INDEX: 27.26 KG/M2 | WEIGHT: 190 LBS | DIASTOLIC BLOOD PRESSURE: 97 MMHG | SYSTOLIC BLOOD PRESSURE: 156 MMHG | TEMPERATURE: 99 F

## 2023-10-03 DIAGNOSIS — S61.213A LACERATION OF LEFT MIDDLE FINGER WITHOUT FOREIGN BODY WITHOUT DAMAGE TO NAIL, INITIAL ENCOUNTER: Primary | ICD-10-CM

## 2023-10-03 DIAGNOSIS — S61.209A AVULSION OF SKIN OF FINGER, INITIAL ENCOUNTER: ICD-10-CM

## 2023-10-03 PROCEDURE — 99282 EMERGENCY DEPT VISIT SF MDM: CPT

## 2023-10-03 PROCEDURE — 25000003 PHARM REV CODE 250: Performed by: PHYSICIAN ASSISTANT

## 2023-10-03 RX ORDER — LIDOCAINE HYDROCHLORIDE 10 MG/ML
5 INJECTION INFILTRATION; PERINEURAL
Status: COMPLETED | OUTPATIENT
Start: 2023-10-03 | End: 2023-10-03

## 2023-10-03 RX ADMIN — LIDOCAINE HYDROCHLORIDE 5 ML: 10 INJECTION, SOLUTION INFILTRATION; PERINEURAL at 10:10

## 2023-10-04 PROCEDURE — 12002 RPR S/N/AX/GEN/TRNK2.6-7.5CM: CPT

## 2023-10-04 NOTE — ED PROVIDER NOTES
Source of History:  Patient     Chief complaint:  Laceration (L hand lacerations noted to the 3rd & 5th digits; both fingernails remain intact w/ good good PMS ROM./Cut by a glass vase. /Up to date with Tdap and already irrigated then dressed PTA //)      HPI:  Gee Montez is a 58 y.o. male Who is presenting to the emergency department with lacerations to his left hand.  Patient states that he was pushing trash down in the trash can when cut his left hand on a broken ceramic vase.  He states that his neighbor is a physician and he contacted her and she cleaned at his wounds well and wrapped them but advised that he will likely need stitches.  He has a persistent bleeding laceration to his left pinky finger and a laceration to his left index finger.  Denies numbness.  Able to range all fingers. Last testanus vaccine was 2017.    ROS: As per HPI     Review of patient's allergies indicates:   Allergen Reactions    Adhes. band-tape-benzalkonium     Adhesive tape-silicones     Aspirin      Hurts stomach    Benadryl itch relief Nausea Only and Palpitations       PMH:  As per HPI and below:  Past Medical History:   Diagnosis Date    Chronic neck pain     Frequent PVCs     Hyperlipidemia      Past Surgical History:   Procedure Laterality Date    COLONOSCOPY N/A 3/20/2020    Procedure: COLONOSCOPY;  Surgeon: Korey Arteaga MD;  Location: [facility] ([address] [place] [address]);  Service: Endoscopy;  Laterality: N/A;  3/16 - confirmed case and new arrival time and patient aware of new policies- sm  3/18/20 - confirmed new arrival time on 0630, drop off policy reviewed, 2nd half prep completed from [id number], Pt verbalized understanding- ERW  3/19/20-patient notified of updated drop o    ESOPHAGOGASTRODUODENOSCOPY N/A 3/28/2022    Procedure: EGD (ESOPHAGOGASTRODUODENOSCOPY);  Surgeon: Orlando Mckenzie MD;  Location: [facility] ([address] [place] [address]);  Service: Endoscopy;  Laterality: N/A;  fully vaccinated       Social History     Tobacco Use     Smoking status: Former     Current packs/day: 0.00     Types: Cigarettes     Quit date: 3/1/1998     Years since quittin.6    Smokeless tobacco: Never   Substance Use Topics    Alcohol use: Yes     Alcohol/week: 5.0 standard drinks of alcohol     Types: 5 Glasses of wine per week     Comment: 2-3 drinks on weekend days, 5 drinks per week    Drug use: Yes       Physical Exam:    BP (!) 156/97 (BP Location: Right arm, Patient Position: Sitting)   Pulse 83   Temp 98.9 °F (37.2 °C) (Oral)   Resp 17   Wt 86.2 kg (190 lb)   SpO2 97%   BMI 27.26 kg/m²     Nursing note and vital signs reviewed.  Appearance: No acute distress.  Eyes: No conjunctival injection.  Cardiovascular: Intact radial pulses   Musculoskeletal: Left hand: good ROM of all digits. Left 3rd digit with intact strength against resistance.   Skin: 2 cm bleeding skin avulsion to the medial aspect of left 3rd digit.   2.5 cm v-shaped, flap laceration to the palmar aspect of the left 3rd digit.  No obvious foreign body.  Neurologic: Motor intact.  Sensation intact.   Mental Status:  Alert and oriented x 3.  Appropriate, conversant.     Lac Repair    Date/Time: 10/4/2023 12:21 AM    Performed by: Sixto Diaz PA-C  Authorized by: Ginny Ryan MD    Consent:     Consent obtained:  Verbal  Anesthesia:     Anesthesia method:  Nerve block    Block needle gauge:  25 G    Block anesthetic:  Lidocaine 1% w/o epi    Block technique:  Digital    Block outcome:  Anesthesia achieved  Laceration details:     Location:  Finger    Finger location:  L long finger    Length (cm):  2.5  Pre-procedure details:     Preparation:  Patient was prepped and draped in usual sterile fashion  Exploration:     Hemostasis achieved with:  Tourniquet  Skin repair:     Repair method:  Sutures    Suture size:  4-0    Suture material:  Nylon    Suture technique:  Simple interrupted    Number of sutures:  5  Approximation:     Approximation:  Close  Repair type:     Repair type:   Simple  Post-procedure details:     Dressing:  Non-adherent dressing    Procedure completion:  Tolerated well, no immediate complications  Lac Repair    Date/Time: 10/4/2023 12:24 AM    Performed by: Sixto Diaz PA-C  Authorized by: Ginny Ryan MD    Anesthesia:     Anesthesia method:  None  Laceration details:     Location:  Finger    Finger location:  L small finger    Length (cm):  2  Exploration:     Hemostasis obtained with: Surgiseal.  Post-procedure details:     Dressing:  Bulky dressing    Procedure completion:  Tolerated well, no immediate complications       MDM/ Differential Dx:    58 y.o. male who is presenting to emergency department with lacerations to left hand.  Left upper extremity is distally neurovascular intact.  No signs of fracture, retained foreign body or tendon injury.  Wounds were thoroughly cleaned by his friend who is a physician.    Hemostasis achieved and wounds repaired as described in procedure note.  Patient given strict wound care instructions and strict return precautions to the ED.        This note was created using [employer] Fluency Direct. There may be typographical errors secondary to dictation.       Diagnostic Impression:    1. Laceration of left middle finger without foreign body without damage to nail, initial encounter    2. Avulsion of skin of finger, initial encounter         ED Disposition Condition    Discharge Stable                    Sixto Diaz PA-C  10/04/23 0026

## 2023-10-04 NOTE — DISCHARGE INSTRUCTIONS
-Take off surigiseal bandage in 48 hours with lukewarm water, then apply Vaseline or antibiotic ointment with continued protective bandage for 5-7 days    -have sutures removed to left middle finger in 7 days.

## 2023-10-04 NOTE — ED NOTES
Patient states he was pushing the trash down and a vase in the trash cut him, pt has a lac to left pinky, with skin missing and bright red blood (large amount). Pt also has a lac to left middle finger. Surgicel applied to left pinky, wrapped with gauze and coban tightly, tourniquet placed below cut while applying the surgicel and wrapping pinky tightly. Tourniquet  released, pt has hand elevated. Pt tolerated well

## 2023-10-11 ENCOUNTER — OFFICE VISIT (OUTPATIENT)
Dept: URGENT CARE | Facility: CLINIC | Age: 58
End: 2023-10-11
Payer: COMMERCIAL

## 2023-10-11 VITALS
SYSTOLIC BLOOD PRESSURE: 139 MMHG | OXYGEN SATURATION: 98 % | RESPIRATION RATE: 18 BRPM | WEIGHT: 190 LBS | TEMPERATURE: 98 F | DIASTOLIC BLOOD PRESSURE: 98 MMHG | BODY MASS INDEX: 27.2 KG/M2 | HEART RATE: 72 BPM | HEIGHT: 70 IN

## 2023-10-11 DIAGNOSIS — J06.9 URI, ACUTE: ICD-10-CM

## 2023-10-11 DIAGNOSIS — S61.213D LACERATION OF LEFT MIDDLE FINGER WITHOUT FOREIGN BODY WITHOUT DAMAGE TO NAIL, SUBSEQUENT ENCOUNTER: ICD-10-CM

## 2023-10-11 DIAGNOSIS — J02.9 SORE THROAT: Primary | ICD-10-CM

## 2023-10-11 LAB
CTP QC/QA: YES
SARS-COV-2 AG RESP QL IA.RAPID: NEGATIVE

## 2023-10-11 PROCEDURE — 99213 OFFICE O/P EST LOW 20 MIN: CPT | Mod: S$GLB,,, | Performed by: SURGERY

## 2023-10-11 PROCEDURE — S0630 REMOVAL OF SUTURES: HCPCS | Mod: S$GLB,,, | Performed by: SURGERY

## 2023-10-11 PROCEDURE — S0630 PR REMOVAL OF SUTURES: ICD-10-PCS | Mod: S$GLB,,, | Performed by: SURGERY

## 2023-10-11 PROCEDURE — 87811 SARS CORONAVIRUS 2 ANTIGEN POCT, MANUAL READ: ICD-10-PCS | Mod: QW,S$GLB,, | Performed by: SURGERY

## 2023-10-11 PROCEDURE — 87811 SARS-COV-2 COVID19 W/OPTIC: CPT | Mod: QW,S$GLB,, | Performed by: SURGERY

## 2023-10-11 PROCEDURE — 99213 PR OFFICE/OUTPT VISIT, EST, LEVL III, 20-29 MIN: ICD-10-PCS | Mod: S$GLB,,, | Performed by: SURGERY

## 2023-10-11 RX ORDER — TOBRAMYCIN AND DEXAMETHASONE 3; 1 MG/ML; MG/ML
1 SUSPENSION/ DROPS OPHTHALMIC 2 TIMES DAILY
COMMUNITY
Start: 2023-07-19 | End: 2023-10-11 | Stop reason: ALTCHOICE

## 2023-10-11 RX ORDER — DOXYCYCLINE HYCLATE 100 MG
100 TABLET ORAL 2 TIMES DAILY
COMMUNITY
Start: 2023-07-19 | End: 2023-10-11 | Stop reason: ALTCHOICE

## 2023-10-11 NOTE — PROGRESS NOTES
"Subjective:      Patient ID: Gee Montez is a 58 y.o. male.    Vitals:  height is 5' 10" (1.778 m) and weight is 86.2 kg (190 lb). His temperature is 97.9 °F (36.6 °C). His blood pressure is 139/98 (abnormal) and his pulse is 72. His respiration is 18 and oxygen saturation is 98%.     Chief Complaint: Sore Throat, Headache, and Suture / Staple Removal    Pt presents complaints of a sore throat, congestion, and headache. Symptoms started yesterday. Worse. Entire throat without pain swallowing. Headache comes and goes. Pt states he feel it all over his head. Pain level 4. OTC nyquil and clartin d taken with mild relief    Suture removal on left hand middle finger. Done 10/03/23. No difficulty moving.     Sore Throat   This is a new problem. The current episode started yesterday. The problem has been gradually worsening. Sore throat worse side: entire. There has been no fever. The pain is at a severity of 4/10. The pain is mild. Associated symptoms include congestion and headaches. Pertinent negatives include no abdominal pain, coughing, diarrhea, drooling, ear discharge, ear pain, hoarse voice, plugged ear sensation, neck pain, swollen glands or trouble swallowing. He has had no exposure to strep or mono. He has tried nothing for the symptoms.   Headache   This is a new problem. The current episode started yesterday. The problem occurs intermittently. The problem has been unchanged. The pain does not radiate. The pain quality is not similar to prior headaches. The quality of the pain is described as aching and shooting. The pain is at a severity of 4/10. Associated symptoms include a sore throat. Pertinent negatives include no abdominal pain, back pain, blurred vision, coughing, dizziness, drainage, ear pain, eye pain, eye redness, eye watering, fever, hearing loss, loss of balance, muscle aches, nausea, neck pain, numbness, sinus pressure, swollen glands or weakness. Nothing aggravates the symptoms. He has tried " NSAIDs for the symptoms. The treatment provided mild relief.   Suture / Staple Removal  The sutures were placed 7 to 10 days ago. He tried nothing since the wound repair. His temperature was unmeasured prior to arrival. There has been no drainage from the wound. The redness has improved. The swelling has improved. The pain has improved. He has no difficulty moving the affected extremity or digit.       Constitution: Negative for fever.   HENT:  Positive for congestion and sore throat. Negative for ear pain, ear discharge, hearing loss, drooling, sinus pressure and trouble swallowing.    Neck: Negative for neck pain.   Eyes:  Negative for eye pain, eye redness and blurred vision.   Respiratory:  Negative for cough.    Gastrointestinal:  Negative for abdominal pain, nausea and diarrhea.   Musculoskeletal:  Negative for back pain.   Neurological:  Positive for headaches. Negative for dizziness, loss of balance and numbness.      Objective:     Physical Exam   Constitutional: He is oriented to person, place, and time. He appears well-developed. He is cooperative.  Non-toxic appearance. He does not appear ill. No distress.   HENT:   Head: Normocephalic and atraumatic.   Ears:   Right Ear: Hearing, tympanic membrane, external ear and ear canal normal.   Left Ear: Hearing, tympanic membrane, external ear and ear canal normal.   Nose: Nose normal. No mucosal edema, rhinorrhea or nasal deformity. No epistaxis. Right sinus exhibits no maxillary sinus tenderness and no frontal sinus tenderness. Left sinus exhibits no maxillary sinus tenderness and no frontal sinus tenderness.   Mouth/Throat: Uvula is midline, oropharynx is clear and moist and mucous membranes are normal. No trismus in the jaw. Normal dentition. No uvula swelling. No oropharyngeal exudate, posterior oropharyngeal edema or posterior oropharyngeal erythema.   Eyes: Conjunctivae and lids are normal. No scleral icterus.   Neck: Trachea normal and phonation normal.  Neck supple. No edema present. No erythema present. No neck rigidity present.   Cardiovascular: Normal rate, regular rhythm, normal heart sounds and normal pulses.   Pulmonary/Chest: Effort normal and breath sounds normal. No respiratory distress. He has no decreased breath sounds. He has no rhonchi.   Abdominal: Normal appearance.   Musculoskeletal: Normal range of motion.         General: No deformity. Normal range of motion.      Left hand: Left middle finger: Lt Middle Finger - Findings: well healed laceration, distal fingertip.   Neurological: He is alert and oriented to person, place, and time. He exhibits normal muscle tone. Coordination normal.   Skin: Skin is warm, dry, intact, not diaphoretic and not pale.   Psychiatric: His speech is normal and behavior is normal. Judgment and thought content normal.   Nursing note and vitals reviewed.      Assessment:     1. Sore throat    2. URI, acute    3. Laceration of left middle finger without foreign body without damage to nail, subsequent encounter        Plan:       Sore throat  -     SARS Coronavirus 2 Antigen, POCT Manual Read    URI, acute    Laceration of left middle finger without foreign body without damage to nail, subsequent encounter      Sutures removed without difficulty. Well healed laceration

## 2023-10-23 ENCOUNTER — PATIENT MESSAGE (OUTPATIENT)
Dept: INTERNAL MEDICINE | Facility: CLINIC | Age: 58
End: 2023-10-23
Payer: COMMERCIAL

## 2023-10-23 DIAGNOSIS — R73.03 PREDIABETES: ICD-10-CM

## 2023-10-23 DIAGNOSIS — I10 ESSENTIAL HYPERTENSION: Primary | ICD-10-CM

## 2023-10-23 DIAGNOSIS — E78.2 MIXED HYPERLIPIDEMIA: ICD-10-CM

## 2023-10-23 DIAGNOSIS — Z12.5 SCREENING PSA (PROSTATE SPECIFIC ANTIGEN): ICD-10-CM

## 2023-10-23 DIAGNOSIS — M54.50 CHRONIC BILATERAL LOW BACK PAIN WITHOUT SCIATICA: ICD-10-CM

## 2023-10-23 DIAGNOSIS — G89.29 CHRONIC BILATERAL LOW BACK PAIN WITHOUT SCIATICA: ICD-10-CM

## 2023-10-24 RX ORDER — TRAMADOL HYDROCHLORIDE 50 MG/1
TABLET ORAL
Qty: 30 TABLET | Refills: 0 | Status: SHIPPED | OUTPATIENT
Start: 2023-10-24 | End: 2024-01-05 | Stop reason: SDUPTHER

## 2023-10-31 ENCOUNTER — LAB VISIT (OUTPATIENT)
Dept: LAB | Facility: OTHER | Age: 58
End: 2023-10-31
Attending: INTERNAL MEDICINE
Payer: COMMERCIAL

## 2023-10-31 DIAGNOSIS — E78.2 MIXED HYPERLIPIDEMIA: ICD-10-CM

## 2023-10-31 DIAGNOSIS — R73.03 PREDIABETES: ICD-10-CM

## 2023-10-31 DIAGNOSIS — Z12.5 SCREENING PSA (PROSTATE SPECIFIC ANTIGEN): ICD-10-CM

## 2023-10-31 LAB
ALBUMIN SERPL BCP-MCNC: 4.3 G/DL (ref 3.5–5.2)
ALP SERPL-CCNC: 40 U/L (ref 55–135)
ALT SERPL W/O P-5'-P-CCNC: 22 U/L (ref 10–44)
ANION GAP SERPL CALC-SCNC: 10 MMOL/L (ref 8–16)
AST SERPL-CCNC: 27 U/L (ref 10–40)
BASOPHILS # BLD AUTO: 0.06 K/UL (ref 0–0.2)
BASOPHILS NFR BLD: 1.2 % (ref 0–1.9)
BILIRUB SERPL-MCNC: 0.6 MG/DL (ref 0.1–1)
BUN SERPL-MCNC: 9 MG/DL (ref 6–20)
CALCIUM SERPL-MCNC: 9.5 MG/DL (ref 8.7–10.5)
CHLORIDE SERPL-SCNC: 104 MMOL/L (ref 95–110)
CHOLEST SERPL-MCNC: 191 MG/DL (ref 120–199)
CHOLEST/HDLC SERPL: 2.5 {RATIO} (ref 2–5)
CO2 SERPL-SCNC: 25 MMOL/L (ref 23–29)
COMPLEXED PSA SERPL-MCNC: 0.77 NG/ML (ref 0–4)
CREAT SERPL-MCNC: 0.9 MG/DL (ref 0.5–1.4)
DIFFERENTIAL METHOD: NORMAL
EOSINOPHIL # BLD AUTO: 0.2 K/UL (ref 0–0.5)
EOSINOPHIL NFR BLD: 4.1 % (ref 0–8)
ERYTHROCYTE [DISTWIDTH] IN BLOOD BY AUTOMATED COUNT: 13.5 % (ref 11.5–14.5)
EST. GFR  (NO RACE VARIABLE): >60 ML/MIN/1.73 M^2
ESTIMATED AVG GLUCOSE: 111 MG/DL (ref 68–131)
GLUCOSE SERPL-MCNC: 100 MG/DL (ref 70–110)
HBA1C MFR BLD: 5.5 % (ref 4–5.6)
HCT VFR BLD AUTO: 47.9 % (ref 40–54)
HDLC SERPL-MCNC: 75 MG/DL (ref 40–75)
HDLC SERPL: 39.3 % (ref 20–50)
HGB BLD-MCNC: 15.8 G/DL (ref 14–18)
IMM GRANULOCYTES # BLD AUTO: 0.01 K/UL (ref 0–0.04)
IMM GRANULOCYTES NFR BLD AUTO: 0.2 % (ref 0–0.5)
LDLC SERPL CALC-MCNC: 104.6 MG/DL (ref 63–159)
LYMPHOCYTES # BLD AUTO: 1.4 K/UL (ref 1–4.8)
LYMPHOCYTES NFR BLD: 28.5 % (ref 18–48)
MCH RBC QN AUTO: 29.6 PG (ref 27–31)
MCHC RBC AUTO-ENTMCNC: 33 G/DL (ref 32–36)
MCV RBC AUTO: 90 FL (ref 82–98)
MONOCYTES # BLD AUTO: 0.5 K/UL (ref 0.3–1)
MONOCYTES NFR BLD: 11.2 % (ref 4–15)
NEUTROPHILS # BLD AUTO: 2.7 K/UL (ref 1.8–7.7)
NEUTROPHILS NFR BLD: 54.8 % (ref 38–73)
NONHDLC SERPL-MCNC: 116 MG/DL
NRBC BLD-RTO: 0 /100 WBC
PLATELET # BLD AUTO: 196 K/UL (ref 150–450)
PMV BLD AUTO: 9.5 FL (ref 9.2–12.9)
POTASSIUM SERPL-SCNC: 4.7 MMOL/L (ref 3.5–5.1)
PROT SERPL-MCNC: 7.1 G/DL (ref 6–8.4)
RBC # BLD AUTO: 5.33 M/UL (ref 4.6–6.2)
SODIUM SERPL-SCNC: 139 MMOL/L (ref 136–145)
TRIGL SERPL-MCNC: 57 MG/DL (ref 30–150)
WBC # BLD AUTO: 4.84 K/UL (ref 3.9–12.7)

## 2023-10-31 PROCEDURE — 84153 ASSAY OF PSA TOTAL: CPT | Performed by: INTERNAL MEDICINE

## 2023-10-31 PROCEDURE — 83036 HEMOGLOBIN GLYCOSYLATED A1C: CPT | Performed by: INTERNAL MEDICINE

## 2023-10-31 PROCEDURE — 36415 COLL VENOUS BLD VENIPUNCTURE: CPT | Performed by: INTERNAL MEDICINE

## 2023-10-31 PROCEDURE — 80053 COMPREHEN METABOLIC PANEL: CPT | Performed by: INTERNAL MEDICINE

## 2023-10-31 PROCEDURE — 80061 LIPID PANEL: CPT | Performed by: INTERNAL MEDICINE

## 2023-10-31 PROCEDURE — 85025 COMPLETE CBC W/AUTO DIFF WBC: CPT | Performed by: INTERNAL MEDICINE

## 2023-11-06 ENCOUNTER — IMMUNIZATION (OUTPATIENT)
Dept: INTERNAL MEDICINE | Facility: CLINIC | Age: 58
End: 2023-11-06
Payer: COMMERCIAL

## 2023-11-06 ENCOUNTER — OFFICE VISIT (OUTPATIENT)
Dept: INTERNAL MEDICINE | Facility: CLINIC | Age: 58
End: 2023-11-06
Payer: COMMERCIAL

## 2023-11-06 VITALS
SYSTOLIC BLOOD PRESSURE: 112 MMHG | WEIGHT: 191.81 LBS | BODY MASS INDEX: 27.46 KG/M2 | OXYGEN SATURATION: 97 % | HEART RATE: 78 BPM | HEIGHT: 70 IN | DIASTOLIC BLOOD PRESSURE: 70 MMHG

## 2023-11-06 DIAGNOSIS — M54.50 CHRONIC BILATERAL LOW BACK PAIN WITHOUT SCIATICA: ICD-10-CM

## 2023-11-06 DIAGNOSIS — I10 ESSENTIAL HYPERTENSION: ICD-10-CM

## 2023-11-06 DIAGNOSIS — E78.2 MIXED HYPERLIPIDEMIA: ICD-10-CM

## 2023-11-06 DIAGNOSIS — M54.2 NECK PAIN: ICD-10-CM

## 2023-11-06 DIAGNOSIS — N40.1 BPH WITH URINARY OBSTRUCTION: ICD-10-CM

## 2023-11-06 DIAGNOSIS — G89.29 CHRONIC BILATERAL LOW BACK PAIN WITHOUT SCIATICA: ICD-10-CM

## 2023-11-06 DIAGNOSIS — N13.8 BPH WITH URINARY OBSTRUCTION: ICD-10-CM

## 2023-11-06 DIAGNOSIS — Z78.9 HEPATITIS B VIRUS SEROLOGIC STATUS UNKNOWN: ICD-10-CM

## 2023-11-06 DIAGNOSIS — S69.92XS INJURY OF LEFT HAND, SEQUELA: ICD-10-CM

## 2023-11-06 DIAGNOSIS — R73.03 PREDIABETES: ICD-10-CM

## 2023-11-06 DIAGNOSIS — Z00.00 VISIT FOR ANNUAL HEALTH EXAMINATION: Primary | ICD-10-CM

## 2023-11-06 PROCEDURE — 3078F DIAST BP <80 MM HG: CPT | Mod: CPTII,S$GLB,, | Performed by: INTERNAL MEDICINE

## 2023-11-06 PROCEDURE — 99396 PREV VISIT EST AGE 40-64: CPT | Mod: S$GLB,,, | Performed by: INTERNAL MEDICINE

## 2023-11-06 PROCEDURE — 99999 PR PBB SHADOW E&M-EST. PATIENT-LVL IV: ICD-10-PCS | Mod: PBBFAC,,, | Performed by: INTERNAL MEDICINE

## 2023-11-06 PROCEDURE — 1160F RVW MEDS BY RX/DR IN RCRD: CPT | Mod: CPTII,S$GLB,, | Performed by: INTERNAL MEDICINE

## 2023-11-06 PROCEDURE — 90686 IIV4 VACC NO PRSV 0.5 ML IM: CPT | Mod: S$GLB,,, | Performed by: INTERNAL MEDICINE

## 2023-11-06 PROCEDURE — 3044F HG A1C LEVEL LT 7.0%: CPT | Mod: CPTII,S$GLB,, | Performed by: INTERNAL MEDICINE

## 2023-11-06 PROCEDURE — 3044F PR MOST RECENT HEMOGLOBIN A1C LEVEL <7.0%: ICD-10-PCS | Mod: CPTII,S$GLB,, | Performed by: INTERNAL MEDICINE

## 2023-11-06 PROCEDURE — 90686 FLU VACCINE (QUAD) GREATER THAN OR EQUAL TO 3YO PRESERVATIVE FREE IM: ICD-10-PCS | Mod: S$GLB,,, | Performed by: INTERNAL MEDICINE

## 2023-11-06 PROCEDURE — 99999 PR PBB SHADOW E&M-EST. PATIENT-LVL IV: CPT | Mod: PBBFAC,,, | Performed by: INTERNAL MEDICINE

## 2023-11-06 PROCEDURE — 3074F SYST BP LT 130 MM HG: CPT | Mod: CPTII,S$GLB,, | Performed by: INTERNAL MEDICINE

## 2023-11-06 PROCEDURE — 99396 PR PREVENTIVE VISIT,EST,40-64: ICD-10-PCS | Mod: S$GLB,,, | Performed by: INTERNAL MEDICINE

## 2023-11-06 PROCEDURE — 3074F PR MOST RECENT SYSTOLIC BLOOD PRESSURE < 130 MM HG: ICD-10-PCS | Mod: CPTII,S$GLB,, | Performed by: INTERNAL MEDICINE

## 2023-11-06 PROCEDURE — 1159F MED LIST DOCD IN RCRD: CPT | Mod: CPTII,S$GLB,, | Performed by: INTERNAL MEDICINE

## 2023-11-06 PROCEDURE — 1160F PR REVIEW ALL MEDS BY PRESCRIBER/CLIN PHARMACIST DOCUMENTED: ICD-10-PCS | Mod: CPTII,S$GLB,, | Performed by: INTERNAL MEDICINE

## 2023-11-06 PROCEDURE — 3008F BODY MASS INDEX DOCD: CPT | Mod: CPTII,S$GLB,, | Performed by: INTERNAL MEDICINE

## 2023-11-06 PROCEDURE — G0008 FLU VACCINE (QUAD) GREATER THAN OR EQUAL TO 3YO PRESERVATIVE FREE IM: ICD-10-PCS | Mod: S$GLB,,, | Performed by: INTERNAL MEDICINE

## 2023-11-06 PROCEDURE — 1159F PR MEDICATION LIST DOCUMENTED IN MEDICAL RECORD: ICD-10-PCS | Mod: CPTII,S$GLB,, | Performed by: INTERNAL MEDICINE

## 2023-11-06 PROCEDURE — 3008F PR BODY MASS INDEX (BMI) DOCUMENTED: ICD-10-PCS | Mod: CPTII,S$GLB,, | Performed by: INTERNAL MEDICINE

## 2023-11-06 PROCEDURE — G0008 ADMIN INFLUENZA VIRUS VAC: HCPCS | Mod: S$GLB,,, | Performed by: INTERNAL MEDICINE

## 2023-11-06 PROCEDURE — 3078F PR MOST RECENT DIASTOLIC BLOOD PRESSURE < 80 MM HG: ICD-10-PCS | Mod: CPTII,S$GLB,, | Performed by: INTERNAL MEDICINE

## 2023-11-06 NOTE — PROGRESS NOTES
INTERNAL MEDICINE ESTABLISHED PATIENT VISIT NOTE    Subjective:     Chief Complaint: Annual Exam       Patient ID: Gee Montez is a 58 y.o. male with HLD, preDM now resolved, ?hx PVCs on bystolic, chronic neck and back pain, hx kidney stones, hx palpitations (seen by Cards c neg w/u including EKG, echo, and Holter), GERD, Vit D def, last seen by me 2022, here today for annual exam and f/u lab results.    Had ED visit last mo due to had lacerations obtained while taking out the trash and cutting his hand on a broken ceramic vase.  Sutures placed in the ED and removed the following week outpatient.    Has gained weight since last visit.      Past Medical History:  Past Medical History:   Diagnosis Date    Chronic neck pain     Frequent PVCs     Hyperlipidemia        Home Medications:  Prior to Admission medications    Medication Sig Start Date End Date Taking? Authorizing Provider   traMADoL (ULTRAM) 50 mg tablet TAKE 1 TABLET BY MOUTH NIGHTLY AS NEEDED FOR PAIN. 10/24/23   Celina Myles MD       Allergies:  Review of patient's allergies indicates:   Allergen Reactions    Adhes. band-tape-benzalkonium     Adhesive tape-silicones     Aspirin      Hurts stomach    Benadryl itch relief Nausea Only and Palpitations       Social History:  Social History     Tobacco Use    Smoking status: Former     Current packs/day: 0.00     Types: Cigarettes     Quit date: 3/1/1998     Years since quittin.7    Smokeless tobacco: Never   Substance Use Topics    Alcohol use: Yes     Alcohol/week: 5.0 standard drinks of alcohol     Types: 5 Glasses of wine per week     Comment: 2-3 drinks on weekend days, 5 drinks per week    Drug use: Yes        Review of Systems   Constitutional:  Negative for appetite change, chills, fatigue, fever and unexpected weight change.   HENT:  Negative for congestion, hearing loss and rhinorrhea.    Eyes:  Negative for pain and visual disturbance.   Respiratory:  Negative for cough, chest tightness,  "shortness of breath and wheezing.    Cardiovascular:  Negative for chest pain, palpitations and leg swelling.   Gastrointestinal:  Negative for abdominal distention and abdominal pain.   Endocrine: Negative for polydipsia and polyuria.   Genitourinary:  Negative for decreased urine volume, dysuria, frequency and penile discharge.   Neurological:  Negative for dizziness, weakness, numbness and headaches.   Psychiatric/Behavioral:  Negative for behavioral problems and confusion.          Health Maintenance:     Immunizations:   Influenza 10/2022, rec repeat today.  TDap 7/2017  Prevnar 13 rec at 64  Shingrix 12/2022, 3/2023  COVID vaccines completed 3/2021, 4/2021, rec booster now (Pfizer)   Hep B x2 completed in the past, will give booster since last titers showed undetectable s Ab    Cancer Screening:  Colonoscopy:  3/2020 c hemorrhoids s/p banding, no polyps, rec repeat in 10 yrs  PSA 10/2023 wnl    Objective:   /70 (BP Location: Left arm, Patient Position: Sitting, BP Method: Large (Manual))   Pulse 78   Ht 5' 10" (1.778 m)   Wt 87 kg (191 lb 12.8 oz)   SpO2 97%   BMI 27.52 kg/m²        General: AAO x3, no apparent distress  HEENT: no cervical LAD, no thyroid masses appreciated  CV: RRR, no m/r/g  Pulm: Lungs CTAB, no crackles, no wheezes  Abd: s/NT/ND +BS  Extremities: no c/c/e    Labs:     Lab Results   Component Value Date    WBC 4.84 10/31/2023    HGB 15.8 10/31/2023    HCT 47.9 10/31/2023    MCV 90 10/31/2023     10/31/2023     Sodium   Date Value Ref Range Status   10/31/2023 139 136 - 145 mmol/L Final     Potassium   Date Value Ref Range Status   10/31/2023 4.7 3.5 - 5.1 mmol/L Final     Chloride   Date Value Ref Range Status   10/31/2023 104 95 - 110 mmol/L Final     CO2   Date Value Ref Range Status   10/31/2023 25 23 - 29 mmol/L Final     Glucose   Date Value Ref Range Status   10/31/2023 100 70 - 110 mg/dL Final     BUN   Date Value Ref Range Status   10/31/2023 9 6 - 20 mg/dL Final "     Creatinine   Date Value Ref Range Status   10/31/2023 0.9 0.5 - 1.4 mg/dL Final     Calcium   Date Value Ref Range Status   10/31/2023 9.5 8.7 - 10.5 mg/dL Final     Total Protein   Date Value Ref Range Status   10/31/2023 7.1 6.0 - 8.4 g/dL Final     Albumin   Date Value Ref Range Status   10/31/2023 4.3 3.5 - 5.2 g/dL Final     Total Bilirubin   Date Value Ref Range Status   10/31/2023 0.6 0.1 - 1.0 mg/dL Final     Comment:     For infants and newborns, interpretation of results should be based  on gestational age, weight and in agreement with clinical  observations.    Premature Infant recommended reference ranges:  Up to 24 hours.............<8.0 mg/dL  Up to 48 hours............<12.0 mg/dL  3-5 days..................<15.0 mg/dL  6-29 days.................<15.0 mg/dL       Alkaline Phosphatase   Date Value Ref Range Status   10/31/2023 40 (L) 55 - 135 U/L Final     AST   Date Value Ref Range Status   10/31/2023 27 10 - 40 U/L Final     ALT   Date Value Ref Range Status   10/31/2023 22 10 - 44 U/L Final     Anion Gap   Date Value Ref Range Status   10/31/2023 10 8 - 16 mmol/L Final     eGFR if    Date Value Ref Range Status   03/18/2022 >60.0 >60 mL/min/1.73 m^2 Final     eGFR if non    Date Value Ref Range Status   03/18/2022 >60.0 >60 mL/min/1.73 m^2 Final     Comment:     Calculation used to obtain the estimated glomerular filtration  rate (eGFR) is the CKD-EPI equation.        Lab Results   Component Value Date    HGBA1C 5.5 10/31/2023     Lab Results   Component Value Date    LDLCALC 104.6 10/31/2023     Lab Results   Component Value Date    TSH 1.819 11/03/2021         Assessment/Plan     Gee was seen today for annual exam.    Diagnoses and all orders for this visit:    Visit for annual health examination  History and physical exam completed.  Health maintenance reviewed as above.    Prediabetes  Lab Results   Component Value Date    HGBA1C 5.5 10/31/2023     Normal  on last few checks since he lost weight overall  Cont lifestyle modifications.    Essential hypertension  Diet controlled  Normal today    Mixed hyperlipidemia  Diet controlled since losing weight  Normal on recent labs    Neck pain  Chronic bilateral low back pain without sciatica  On Tramadol prn  30 pills with 2 refills to last 6 mos  Can refill next month and resume normal refill cycle    BPH with urinary obstruction  Stable, no issues    Injury of left hand, sequela  Pt requesting OT due to stiffness following recent injury  Okay for referral  -     Ambulatory referral/consult to Physical/Occupational Therapy; Future    Hepatitis B virus serologic status unknown  Vacc x2 in the past but last Ab check c undetectable Ab titers  Will give booster and repeat titers in 8 weeks or so after that  -     Hepatitis B Surface Antibody, Qual/Quant; Future        HM as above  RTC in 6 mos, sooner if needed.  Hep B booster in 2 weeks and titers 8 weeks after that.    Celina Myles MD  Department of Internal Medicine - [facility]  11/06/2023

## 2023-11-06 NOTE — PATIENT INSTRUCTIONS
Send Dr. Myles exact dates of Shingles vaccines.  Get your Hepatitis B booster at [facility] in 2 weeks.

## 2023-11-17 ENCOUNTER — CLINICAL SUPPORT (OUTPATIENT)
Dept: REHABILITATION | Facility: HOSPITAL | Age: 58
End: 2023-11-17
Attending: INTERNAL MEDICINE
Payer: COMMERCIAL

## 2023-11-17 DIAGNOSIS — M79.642 HAND PAIN, LEFT: ICD-10-CM

## 2023-11-17 DIAGNOSIS — M25.60 RANGE OF MOTION DEFICIT: Primary | ICD-10-CM

## 2023-11-17 DIAGNOSIS — S69.92XS INJURY OF LEFT HAND, SEQUELA: ICD-10-CM

## 2023-11-17 PROCEDURE — 97165 OT EVAL LOW COMPLEX 30 MIN: CPT

## 2023-11-17 PROCEDURE — 97530 THERAPEUTIC ACTIVITIES: CPT

## 2023-11-17 PROCEDURE — 97018 PARAFFIN BATH THERAPY: CPT

## 2023-11-17 NOTE — PATIENT INSTRUCTIONS
"[facility] - OCCUPATIONAL THERAPY  HOME EXERCISE PROGRAM     Soak hand in hot water or use paraffin bath x 10 min before exercises or in the morning to  Increase flexibility       Complete the following massages for 5 minutes each, 2x/day.                   Rub up/down scar, across and circular motion   With moderate pressure using any lotion     Complete the following exercises with 10 repetitions each, 3-4x/day.     AROM: DIP Flexion / Extension  Pinch middle knuckle to prevent bending. Bend end knuckle until stretch is felt.   Hold 3 seconds. Relax. Straighten finger as far as possible.    AROM: PIP Flexion / Extension  Pinch bottom knuckle  to prevent bending. Actively bend middle knuckle until stretch is felt.   Hold 3 seconds. Relax. Straighten finger as far as possible.    AROM: Isolated PIP Flexion  Bend only middle joint of your finger, keeping other fingers straight with other hand.        AROM: Isolated IPJ Flexion / Extension ("Hook")  Bend only your middle and end knuckles. Hold 3 seconds.   Straighten your fingers.     AROM: Composite Flexion / Extension ("Full Fist")  Bend every joint in your hand into a fist. Hold 3 seconds. Straighten your fingers.     AROM: Composte Extension ("Finger Lifts")  Lift your finger off of the table one at a time. Hold 3 seconds. Relax your finger.    AROM: Abduction / Adduction  With hand flat on table, spread all fingers apart, then bring them together as close as possible.    Copyright © [employer]. All rights reserved.     Therapist: AMBER Murphy, ALFREDO    "

## 2023-11-20 PROBLEM — M79.642 HAND PAIN, LEFT: Status: ACTIVE | Noted: 2023-11-20

## 2023-11-20 PROBLEM — M25.60 RANGE OF MOTION DEFICIT: Status: ACTIVE | Noted: 2023-11-20

## 2023-11-21 NOTE — PLAN OF CARE
[facility]  Occupational Therapy Initial Evaluation     Name: Gee Montez  Clinic Number: [id number]    Therapy Diagnosis:   Encounter Diagnoses   Name Primary?    Injury of left hand, sequela     Range of motion deficit Yes    Hand pain, left        Medical Diagnosis: Left hand laceration   ICD-10: S69.92XS (ICD-10-CM) - Injury of left hand, sequela     Physician: Celina Myles MD  Physician Orders: Eval and Treat    Surgical Procedure and Date: n/a, n/a     Evaluation Date: 11/17/2023    Plan of Care Certification Period: 2/17/2024    Date of Return to MD: as needed     Visit # / Visits authorized: 1 / 12  Insurance Authorization Period Expiration: pending     FOTO #1: ___/____  FOTO #2:   FOTO #3:     Precautions:  Standard    Time In:10  Time Out: 11  Total Appointment Time (timed & untimed codes): 60 minutes    Subjective     Date of Onset: 10/3/23    History of Current Condition/Mechanism of Injury: Physician reports: Had ED visit last month  due to hand lacerations obtained while taking out the trash and cutting his hand on a broken ceramic vase     Falls: none     Involved Side: left   Dominant Side: Right  Imaging: See Chart   Prior Therapy: none     Pain:  Functional Pain Scale Rating 0-10:   7/10 on average  7/10 at best  8/10 at worst  Location: volar middle finger   Description: Aching and Dull  Aggravating Factors: injection in finger   Easing Factors: nothing helps     Occupation:  [employer], Furniture store sales   Working presently: employed  Duties: lifting, typing, gripping     Functional Limitations/Social History:    Previous functional status includes: Independent with all ADLs.     Current Functional Status   Home/Living environment: lives with their male spouse         Limitation of Functional Status as follows:   ADLs/IADLs:   - Feeding:  IND    - Bathing: IND     - Dressing/Grooming: IND     - Home Management: lifting, gripping,     - Driving: limited  for  driving     - Gripping/pinching: limited /pinch      Leisure: plays piano , dog walking     Patient's Goals for Therapy: get feeling back in finger so it doesn't get worse.     Past Medical History/Physical Systems Review:   Gee Montez  has a past medical history of Chronic neck pain, Frequent PVCs, and Hyperlipidemia.    Gee Montez  has a past surgical history that includes Colonoscopy (N/A, 3/20/2020) and Esophagogastroduodenoscopy (N/A, 3/28/2022).    Gee has a current medication list which includes the following prescription(s): tramadol.    Review of patient's allergies indicates:   Allergen Reactions    Adhes. band-tape-benzalkonium     Adhesive tape-silicones     Aspirin      Hurts stomach    Benadryl itch relief Nausea Only and Palpitations        Objective     Observation/Appearance:  mild adhesions noted on volar DIP crease of MF       Edema. Measured in centimeters.   11/17/2023       Long:      P1 6.8    PIP 6.9   P2 6.4    DIP 6.0   P3 5.8     Hand ROM. Measured in degrees.   11/17/2023           Long:  MP 0/90              PIP 0/75              DIP 0/42              STREETER 207       Sensation- slight complaints of hypersensitivity along scar adhesion    Special Tests: n/a       Strength (Dyanmometer) and Pinch Strength (Pinch Gauge)  Measured in pounds and psi. Average of three trials.   11/17/2023 11/17/2023    Right  Left    Rung II 78.9 44.6   Key Pinch     3pt Pinch 10 7   2pt Pinch         Manual Muscle Testing:  MIDDLE FINGER   FDP/FDS 3-/5        CMS Impairment/Limitation/Restriction for FOTO Survey    Therapist reviewed FOTO scores for Gee Montez on 11/17/2023.   FOTO documents entered into EPIC - see Media section.    Limitation Score: ___%      Goal: ____%  Category: Self Care         Treatment     Total Treatment time separate from Evaluation time:25    Gee received the treatments listed below:      Supervised Modalities after being cleared for contradictions:  Paraffin      Therapeutic Activities to improve functional performance for 15  minutes, including:  -Blocking FDP, FDS, isolated FDS, hook, wave, flat and full fist, digit ab/ad and digit extension x 10 reps each 3-4 x daily   - scar massage 2x daily   -modality use for pain, stiffness, swelling     Patient Education and Home Exercises      Education provided:   -role of OT, goals for OT, scheduling/cancellations, insurance limitations with patient.  -Additional Education provided: yes    Written Home Exercises Provided: yes.  Exercises were reviewed and Gee was able to demonstrate them prior to the end of the session.    Gee demonstrated good  understanding of the education provided.     Pt was advised to perform these exercises free of pain, and to stop performing them if pain occurs.    See EMR under Patient Instructions for exercises provided 11/17/2023.    Assessment     Gee Montez is a 58 y.o. male referred to outpatient occupational therapy and presents with a medical diagnosis of L middle finger laceration .      Assessment of Occupational Performance   Performance Deficits    Physical:  Joint Mobility  Muscle Power/Strength  Muscle Endurance  Skin Integrity/Scar Formation  Edema   Strength  Pinch Strength  Gross Motor Coordination  Fine Motor Coordination  Tactile Functions  Pain    Cognitive:  No Deficits    Psychosocial:    Habits  Routines     Following medical record review it is determined that pt will benefit from occupational therapy services in order to maximize pain free and/or functional use of left hand. The following goals were discussed with the patient and patient is in agreement with them as to be addressed in the treatment plan. The patient's rehab potential is Excellent.     Anticipated barriers to occupational therapy: none     Plan of care discussed with patient: Yes   Patient's spiritual, cultural and educational needs considered and patient is agreeable to the plan of  care and goals as stated below:     Medical Necessity is demonstrated by the following  Occupational Profile/History  Co-morbidities and personal factors that may impact the plan of care [x] LOW: Brief chart review  [] MODERATE: Expanded chart review   [] HIGH: Extensive chart review    Moderate / High Support Documentation: none      Examination  Performance deficits relating to physical, cognitive or psychosocial skills that result in activity limitations and/or participation restrictions  [x] LOW: addressing 1-3 Performance deficits  [] MODERATE: 3-5 Performance deficits  [] HIGH: 5+ Performance deficits (please support below)    Moderate / High Support Documentation: none      Treatment Options [x] LOW: Limited options  [] MODERATE: Several options  [] HIGH: Multiple options      Decision Making/ Complexity Score: Low Complexity        The following goals were discussed with the patient and patient is in agreement with them as to be addressed in the treatment plan.     Short Term Goals: (6 weeks):  1)  Patient to be IND with HEP and modalities for pain management  2)  Increase ROM 3-10 degrees to increase functional hand use for ADLs/work/leisure activities  3)   Decrease edema .1-.5mm to increase joint mobility /flexibility for functional hand use.   4)  Assess   and pinch and set goals accordingly      Long Term Goals :To be met by discharge (12 weeks)   1)  Increase  strength 2-8 lbs. For lifting furniture  2) Increase pinch strength 1-4 psi's for typing and FM activity   3)   Increase ROM 11-20 degrees to increase functional hand use for ADLs/work/leisure activities  4) Patient to score at ____%  or more  on FOTO to demonstrate improved perception of functional left  hand  Use.    Plan   Certification Period/Plan of care expiration: 11/17/2023 to 1/17/2024.    Outpatient Occupational Therapy 1-2 times weekly for 6-12 weeks to include the following interventions: Paraffin, Fluidotherapy, Manual  therapy/joint mobilizations, Modalities for pain management, US 3 mhz, Therapeutic exercises/activities., Strengthening, Edema Control, and Scar Management.    Theresa Layne, OT, CHT

## 2023-12-20 ENCOUNTER — CLINICAL SUPPORT (OUTPATIENT)
Dept: REHABILITATION | Facility: HOSPITAL | Age: 58
End: 2023-12-20
Payer: COMMERCIAL

## 2023-12-20 DIAGNOSIS — M79.642 HAND PAIN, LEFT: ICD-10-CM

## 2023-12-20 DIAGNOSIS — M25.60 RANGE OF MOTION DEFICIT: Primary | ICD-10-CM

## 2023-12-20 PROCEDURE — 97140 MANUAL THERAPY 1/> REGIONS: CPT

## 2023-12-20 PROCEDURE — 97018 PARAFFIN BATH THERAPY: CPT

## 2023-12-20 PROCEDURE — 97530 THERAPEUTIC ACTIVITIES: CPT

## 2023-12-20 NOTE — PROGRESS NOTES
[facility]  Occupational Therapy Treatment Note    Date: 12/20/2023  Name: Gee Montez  Clinic Number: [id number]    Therapy Diagnosis:        Encounter Diagnoses   Name Primary?    Injury of left hand, sequela      Range of motion deficit Yes    Hand pain, left           Medical Diagnosis: Left hand laceration   ICD-10: S69.92XS (ICD-10-CM) - Injury of left hand, sequela      Physician: Celina Myles MD  Physician Orders: Eval and Treat     Surgical Procedure and Date: n/a, n/a      Evaluation Date: 11/17/2023     Plan of Care Certification Period: 2/17/2024     Date of Return to MD: as needed      Visit # / Visits authorized: 2 / 7  Insurance Authorization Period Expiration: 12/31/23     FOTO #1: ___/____  FOTO #2:   FOTO #3:      Precautions:  Standard     Time In:1110  Time Out: 1155  Total Appointment Time (timed & untimed codes): 45 minutes    SUBJECTIVE     Pt reports:  I wasn't able to get in any sooner, and I was on vacation and stuff. I am still having some numbness in MF, left from mid-nail bed and tip of finger. But my thumbs are both bothering me L>R and she said she would put that in order too.   He was compliant with home exercise program given last session.   Response to previous treatment:more motion  , less numbness  Functional change: none     Pain: 2/10  Location: left middle finger, P1 volar, and finger tip, and B thumb CMC joint pain       OBJECTIVE     Objective Measures updated at progress report unless specified.  Edema. Measured in centimeters.    11/17/2023         Long:       P1 6.8    PIP 6.9   P2 6.4    DIP 6.0   P3 5.8      Hand ROM. Measured in degrees.    11/17/2023               Long:  MP 0/90              PIP 0/75              DIP 0/42              STREETER 207    Bilateral CMC arthritis per report L worse than R      Sensation- slight complaints of hypersensitivity along scar adhesion     Special Tests: n/a         Strength (Dyanmometer) and Pinch  Strength (Pinch Gauge)  Measured in pounds and psi. Average of three trials.    11/17/2023 11/17/2023     Right  Left    Rung II 78.9 44.6   Key Pinch       3pt Pinch 10 7   2pt Pinch             Manual Muscle Testing:  MIDDLE FINGER   FDP/FDS 3-/5  Treatment     Gee received the treatments listed below:     Supervised Modalities after being cleared for contradictions: Paraffin x 10 min to B hands to increase blood flow, circulation for B CMC arthritis and left MF laceration         Therapeutic Activities to improve functional performance for 20  minutes, including:  -Blocking FDP, FDS, isolated FDS, hook, wave, flat and full fist, digit ab/ad and digit extension x 10 reps each   -rice for desensitization to left MF   - thumb ROM , Bilaterally for CMC arthritis, flex, opposition    - left grooved pegboard for FM coordination and desensitization   - hook, wave, fist for left   -modality use for pain, stiffness, swelling     Manual therapy techniques: for 15 min (including Joint mobilizations, Myofacial release, Manual Lymphatic Drainage, Soft tissue Mobilization, and Friction Massage)  were applied to the B hands as follows:   - DTM to B thumb, CMC's and thenar region to increase blood flow, circulation secondary to CMC arthritis L>R  -STM, scar massage to Left MF volar DIP crease and pulp to decrease adhesions   - brief suction cup to volar MF to decrease adhesions     Patient Education and Home Exercises      Education provided:   - Cont HEP   -- Progress towards goals     Written Home Exercises Provided: Patient instructed to cont prior HEP.  Exercises were reviewed and Gee was able to demonstrate them prior to the end of the session.  Gee demonstrated good  understanding of the HEP provided. See EMR under Patient Instructions for exercises provided during therapy sessions.       Assessment     Gee is progressing well towards his goals and there are no updates to goals at this time. Pt prognosis is  Good. Complaints of left CMC pain > Right and continued numbness volar/dorsal mid P1 to fingertip of MF.  Mild adhesions noted.     Pt will continue to benefit from skilled outpatient occupational therapy to address the deficits listed in the problem list on initial evaluation provide pt/family education and to maximize pt's level of independence in the home and community environment.     Pt's spiritual, cultural and educational needs considered and pt agreeable to plan of care and goals.    Anticipated barriers to occupational therapy: none     The following goals were discussed with the patient and patient is in agreement with them as to be addressed in the treatment plan.      Short Term Goals: (6 weeks):  1)  Patient to be IND with HEP and modalities for pain management  2)  Increase ROM 3-10 degrees to increase functional hand use for ADLs/work/leisure activities  3)   Decrease edema .1-.5mm to increase joint mobility /flexibility for functional hand use.   4)  Assess   and pinch and set goals accordingly      Long Term Goals :To be met by discharge (12 weeks)   1)  Increase  strength 2-8 lbs. For lifting furniture  2) Increase pinch strength 1-4 psi's for typing and FM activity   3)   Increase ROM 11-20 degrees to increase functional hand use for ADLs/work/leisure activities  4) Patient to score at ____%  or more  on FOTO to demonstrate improved perception of functional left  hand  Use.     Plan   Certification Period/Plan of care expiration: 11/17/2023 to 1/17/2024.        Theresa Layne, OT  , CHT

## 2023-12-27 ENCOUNTER — CLINICAL SUPPORT (OUTPATIENT)
Dept: REHABILITATION | Facility: HOSPITAL | Age: 58
End: 2023-12-27
Payer: COMMERCIAL

## 2023-12-27 DIAGNOSIS — M25.60 RANGE OF MOTION DEFICIT: Primary | ICD-10-CM

## 2023-12-27 DIAGNOSIS — M79.642 HAND PAIN, LEFT: ICD-10-CM

## 2023-12-27 PROCEDURE — 97140 MANUAL THERAPY 1/> REGIONS: CPT

## 2023-12-27 PROCEDURE — 97018 PARAFFIN BATH THERAPY: CPT | Mod: 59

## 2023-12-27 PROCEDURE — 97530 THERAPEUTIC ACTIVITIES: CPT

## 2023-12-27 NOTE — PROGRESS NOTES
[facility]  Occupational Therapy Treatment Note    Date: 12/27/2023  Name: Gee Montez  Clinic Number: [id number]    Therapy Diagnosis:        Encounter Diagnoses   Name Primary?    Injury of left hand, sequela      Range of motion deficit Yes    Hand pain, left           Medical Diagnosis: Left hand laceration  and B CMC arthritis   ICD-10: S69.92XS (ICD-10-CM) - Injury of left hand, sequela      Physician: Celina Myles MD  Physician Orders: Eval and Treat     Surgical Procedure and Date: n/a, n/a      Evaluation Date: 11/17/2023     Plan of Care Certification Period: 2/17/2024     Date of Return to MD: as needed      Visit # / Visits authorized: 3 / 7  Insurance Authorization Period Expiration: 12/31/23        Precautions:  Standard     Time In:1110  Time Out: 1155  Total Appointment Time (timed & untimed codes): 45 minutes    SUBJECTIVE     Pt reports:  I did play the piano but my left thumb is giving me a lot of pain.  I forgot what other exercises (possible joint protection handout) you were suppose to give me last time.     He was compliant with home exercise program given last session.   Response to previous treatment:more motion  , less numbness  Functional change: played piano short duration     Pain: 2/10  Location: left middle finger, P1 volar, and finger tip, and B thumb CMC joint pain       OBJECTIVE     Objective Measures updated at progress report unless specified.  Edema. Measured in centimeters.    11/17/2023         Long:       P1 6.8    PIP 6.9   P2 6.4    DIP 6.0   P3 5.8      Hand ROM. Measured in degrees.    11/17/2023               Long:  MP 0/90              PIP 0/75              DIP 0/42              STREETER 207    Bilateral CMC arthritis per report L worse than R      Sensation- slight complaints of hypersensitivity along scar adhesion      Strength (Dyanmometer) and Pinch Strength (Pinch Gauge)  Measured in pounds and psi. Average of three trials.     11/17/2023 11/17/2023     Right  Left    Rung II 78.9 44.6   Key Pinch       3pt Pinch 10 7   2pt Pinch             Treatment     Gee received the treatments listed below:     Supervised Modalities after being cleared for contradictions: Paraffin x 10 min to B hands to increase blood flow, circulation for B CMC arthritis and left MF laceration         Therapeutic Activities to improve functional performance for 20  minutes, including:  -Blocking FDP, FDS, isolated FDS, hook, wave, flat and full fist, digit ab/ad and digit extension x 10 reps each   - thumb ROM , Bilaterally for CMC arthritis, flex, opposition    - added yellow theraputty for gross , raking with MF, isolated FDS glide with middle finger, key, 2 pt pinch with MF/thumb, and thumb press for B hand strengthening x 10 reps each. Provided handout and theraputty for HEP.   - unable to get joint protection handout to print; will print and forward via email to patient.   - hook, wave, fist for left   -modality use for pain, stiffness, swelling     Manual therapy techniques: for 15 min (including Joint mobilizations, Myofacial release, Manual Lymphatic Drainage, Soft tissue Mobilization, and Friction Massage)  were applied to the B hands as follows:   - DTM to B thumb, CMC's and thenar region to increase blood flow, circulation secondary to CMC arthritis L>R  -STM, scar massage to Left MF volar DIP crease and pulp to decrease adhesions   - brief suction cup to volar MF to decrease adhesions     Patient Education and Home Exercises      Education provided:   - Cont HEP   -- Progress towards goals     Written Home Exercises Provided: Patient instructed to cont prior HEP.  Exercises were reviewed and Gee was able to demonstrate them prior to the end of the session.  Gee demonstrated good  understanding of the HEP provided. See EMR under Patient Instructions for exercises provided during therapy sessions.       Assessment     Gee is progressing  well towards his goals and there are no updates to goals at this time. Pt prognosis is Good. Complaints of left CMC pain > Right and continued numbness volar/dorsal mid P1 to fingertip of MF.  Mild adhesions noted.     Pt will continue to benefit from skilled outpatient occupational therapy to address the deficits listed in the problem list on initial evaluation provide pt/family education and to maximize pt's level of independence in the home and community environment.     Pt's spiritual, cultural and educational needs considered and pt agreeable to plan of care and goals.    Anticipated barriers to occupational therapy: none     The following goals were discussed with the patient and patient is in agreement with them as to be addressed in the treatment plan.      Short Term Goals: (6 weeks):  1)  Patient to be IND with HEP and modalities for pain management  2)  Increase ROM 3-10 degrees to increase functional hand use for ADLs/work/leisure activities  3)   Decrease edema .1-.5mm to increase joint mobility /flexibility for functional hand use.   4)  Assess   and pinch and set goals accordingly      Long Term Goals :To be met by discharge (12 weeks)   1)  Increase  strength 2-8 lbs. For lifting furniture  2) Increase pinch strength 1-4 psi's for typing and FM activity   3)   Increase ROM 11-20 degrees to increase functional hand use for ADLs/work/leisure activities     Plan   Certification Period/Plan of care expiration: 11/17/2023 to 1/17/2024.        Theresa Layne, OT  , CHT

## 2023-12-27 NOTE — PATIENT INSTRUCTIONS
[facility]  OCCUPATIONAL THERAPY  HOME EXERCISE PROGRAM     Complete the following strengthening program 1x/day.       10 reps each, 1-2 x day as tolerated; decrease frequency if   Thumb pain worsens.                 _2 pt thumb and index    3 pt thumb, index and middle                         Theresa CLARK CHT  Certified Hand Therapist  Occupational Therapist

## 2024-01-04 ENCOUNTER — PATIENT MESSAGE (OUTPATIENT)
Dept: INTERNAL MEDICINE | Facility: CLINIC | Age: 59
End: 2024-01-04
Payer: COMMERCIAL

## 2024-01-04 DIAGNOSIS — M54.50 CHRONIC BILATERAL LOW BACK PAIN WITHOUT SCIATICA: ICD-10-CM

## 2024-01-04 DIAGNOSIS — G89.29 CHRONIC BILATERAL LOW BACK PAIN WITHOUT SCIATICA: ICD-10-CM

## 2024-01-05 RX ORDER — TRAMADOL HYDROCHLORIDE 50 MG/1
TABLET ORAL
Qty: 30 TABLET | Refills: 0 | Status: SHIPPED | OUTPATIENT
Start: 2024-01-05

## 2024-01-05 NOTE — TELEPHONE ENCOUNTER
reviewed, controlled meds refilled    Since I am not his PCP and I have never met him, I did 30 days with no refills, he can review this with Dr. Myles when he sees her thanks    Okay to send my [employer] message

## 2024-01-05 NOTE — TELEPHONE ENCOUNTER
Does he want the tramadol refilled today?  If so, please check which pharmacy    Also, please assist with scheduling whatever follow-up labs he needs as well as appointment with Dr. Myles or Diya

## 2024-01-05 NOTE — TELEPHONE ENCOUNTER
Called and spoke with pt, states he would like the tramadol sent to the pharmacy on file. Pt has a f/u hept b appt with Dr. Myles. States he will walk in before that time for the shot

## 2024-01-10 ENCOUNTER — CLINICAL SUPPORT (OUTPATIENT)
Dept: REHABILITATION | Facility: HOSPITAL | Age: 59
End: 2024-01-10
Payer: COMMERCIAL

## 2024-01-10 DIAGNOSIS — M79.642 HAND PAIN, LEFT: ICD-10-CM

## 2024-01-10 DIAGNOSIS — M25.60 RANGE OF MOTION DEFICIT: Primary | ICD-10-CM

## 2024-01-10 PROCEDURE — 97530 THERAPEUTIC ACTIVITIES: CPT

## 2024-01-10 PROCEDURE — 97140 MANUAL THERAPY 1/> REGIONS: CPT

## 2024-01-10 PROCEDURE — 97018 PARAFFIN BATH THERAPY: CPT | Mod: 59

## 2024-01-10 NOTE — PROGRESS NOTES
[facility]  Occupational Therapy Treatment Note    Date: 1/10/2024  Name: Gee Montez  Clinic Number: [id number]    Therapy Diagnosis:        Encounter Diagnoses   Name Primary?    Injury of left hand, sequela      Range of motion deficit Yes    Hand pain, left           Medical Diagnosis: Left hand laceration  and B CMC arthritis   ICD-10: S69.92XS (ICD-10-CM) - Injury of left hand, sequela      Physician: Celina Myles MD  Physician Orders: Eval and Treat     Surgical Procedure and Date: n/a, n/a      Evaluation Date: 11/17/2023     Plan of Care Certification Period: 2/17/2024     Date of Return to MD: as needed      Visit # / Visits authorized: 4 / 7  Insurance Authorization Period Expiration: 12/31/23        Precautions:  Standard     Time In:3  Time Out: 345  Total Appointment Time (timed & untimed codes): 45 minutes    SUBJECTIVE     Pt reports:  I did play the piano but only for short period of time;  I forgot what other exercises (possible joint protection handout) you were suppose to give me last time. Patient reported he bought tool to perform DTM to B thumbs.     He was compliant with home exercise program given last session.   Response to previous treatment:more motion  , less numbness  Functional change: played piano short duration     Pain: 2/10  Location: left middle finger, P1 volar, and finger tip, and B thumb CMC joint pain       OBJECTIVE     Objective Measures updated at progress report unless specified.  Edema. Measured in centimeters.    11/17/2023         Long:       P1 6.8    PIP 6.9   P2 6.4    DIP 6.0   P3 5.8      Hand ROM. Measured in degrees.    11/17/2023               Long:  MP 0/90              PIP 0/75              DIP 0/42              STREETER 207    Bilateral CMC arthritis per report L worse than R      Sensation- slight complaints of hypersensitivity along scar adhesion      Strength (Dyanmometer) and Pinch Strength (Pinch Gauge)  Measured in pounds  and psi. Average of three trials.    11/17/2023 11/17/2023     Right  Left    Rung II 78.9 44.6   Key Pinch       3pt Pinch 10 7   2pt Pinch             Treatment     Gee received the treatments listed below:     Supervised Modalities after being cleared for contradictions: Paraffin x 10 min to B hands to increase blood flow, circulation for B CMC arthritis and left MF laceration         Therapeutic Activities to improve functional performance for 20  minutes, including:  -Blocking FDP, FDS, isolated FDS, hook, wave, flat and full fist, digit ab/ad and digit extension x 10 reps each   - thumb ROM , Bilaterally for CMC arthritis, flex, opposition    - added yellow theraputty for gross , raking with MF, isolated FDS glide with middle finger, key, 2 pt pinch with MF/thumb, and thumb press for B hand strengthening x 10 reps each. Provided handout and theraputty for HEP.   - added thumb adduction with putty   - pink sponge for thumb adduction   -  joint protection handout printed and reviewed with patient.   - hook, wave, fist for left   -modality use for pain, stiffness, swelling     Manual therapy techniques: for 15 min (including Joint mobilizations, Myofacial release, Manual Lymphatic Drainage, Soft tissue Mobilization, and Friction Massage)  were applied to the B hands as follows:   - DTM to B thumb, CMC's and thenar region to increase blood flow, circulation secondary to CMC arthritis L>R  -STM, scar massage to Left MF volar DIP crease and pulp to decrease adhesions   - brief suction cup to volar MF to decrease adhesions     Patient Education and Home Exercises      Education provided:   - Cont HEP   -- Progress towards goals     Written Home Exercises Provided: Patient instructed to cont prior HEP.  Exercises were reviewed and Gee was able to demonstrate them prior to the end of the session.  Gee demonstrated good  understanding of the HEP provided. See EMR under Patient Instructions for exercises  provided during therapy sessions.       Assessment     Gee is progressing well towards his goals and there are no updates to goals at this time. Pt prognosis is Good. Complaints of left CMC pain > Right and continued numbness volar/dorsal mid P1 to fingertip of MF.  Mild adhesions noted.     Pt will continue to benefit from skilled outpatient occupational therapy to address the deficits listed in the problem list on initial evaluation provide pt/family education and to maximize pt's level of independence in the home and community environment.     Pt's spiritual, cultural and educational needs considered and pt agreeable to plan of care and goals.    Anticipated barriers to occupational therapy: none     The following goals were discussed with the patient and patient is in agreement with them as to be addressed in the treatment plan.      Short Term Goals: (6 weeks):  1)  Patient to be IND with HEP and modalities for pain management  2)  Increase ROM 3-10 degrees to increase functional hand use for ADLs/work/leisure activities  3)   Decrease edema .1-.5mm to increase joint mobility /flexibility for functional hand use.   4)  Assess   and pinch and set goals accordingly      Long Term Goals :To be met by discharge (12 weeks)   1)  Increase  strength 2-8 lbs. For lifting furniture  2) Increase pinch strength 1-4 psi's for typing and FM activity   3)   Increase ROM 11-20 degrees to increase functional hand use for ADLs/work/leisure activities     Plan   Certification Period/Plan of care expiration: 11/17/2023 to 1/17/2024.        Theresa Layne, OT  , CHT

## 2024-01-17 ENCOUNTER — PATIENT MESSAGE (OUTPATIENT)
Dept: REHABILITATION | Facility: HOSPITAL | Age: 59
End: 2024-01-17
Payer: COMMERCIAL

## 2024-01-24 ENCOUNTER — CLINICAL SUPPORT (OUTPATIENT)
Dept: REHABILITATION | Facility: HOSPITAL | Age: 59
End: 2024-01-24
Payer: COMMERCIAL

## 2024-01-24 DIAGNOSIS — M79.642 HAND PAIN, LEFT: ICD-10-CM

## 2024-01-24 DIAGNOSIS — M25.60 RANGE OF MOTION DEFICIT: Primary | ICD-10-CM

## 2024-01-24 PROCEDURE — 97140 MANUAL THERAPY 1/> REGIONS: CPT

## 2024-01-24 PROCEDURE — 97018 PARAFFIN BATH THERAPY: CPT | Mod: 59

## 2024-01-24 PROCEDURE — 97530 THERAPEUTIC ACTIVITIES: CPT

## 2024-01-24 NOTE — PROGRESS NOTES
[facility]  Occupational Therapy Treatment Note    Date: 1/24/2024  Name: Gee Montez  Clinic Number: [id number]    Therapy Diagnosis:        Encounter Diagnoses   Name Primary?    Injury of left hand, sequela      Range of motion deficit Yes    Hand pain, left           Medical Diagnosis: Left hand laceration  and B CMC arthritis   ICD-10: S69.92XS (ICD-10-CM) - Injury of left hand, sequela      Physician: Celina Myles MD  Physician Orders: Eval and Treat     Surgical Procedure and Date: n/a, n/a      Evaluation Date: 11/17/2023     Plan of Care Certification Period: 2/17/2024     Date of Return to MD: as needed      Visit # / Visits authorized: 5 / 7  Insurance Authorization Period Expiration: 12/31/23        Precautions:  Standard     Time In: 3  Time Out: 345  Total Appointment Time (timed & untimed codes): 45 minutes    SUBJECTIVE     Pt reports:  Patient reported he bought tool to perform DTM to B thumbs. 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     He was compliant with home exercise program given last session.   Response to previous treatment:more motion  , less numbness  Functional change: played piano short duration     Pain: 2/10  Location: left middle finger, P1 volar, and finger tip, and B thumb CMC joint pain       OBJECTIVE     Objective Measures updated at progress report unless specified.  Edema. Measured in centimeters.    11/17/2023 1/24/24          Long:        P1 6.8 6.5 (-0.3)    PIP 6.9 6.4 (-0.5)   P2 6.4 5.6 (-0.8)    DIP 6.0 5.3 (-0.7)   P3 5.8 5.0 (-0.8)      Hand ROM. Measured in degrees.    11/17/2023 1/24/24                 Long:  MP 0/90 0/95              PIP 0/75 0/103              DIP 0/42 0/50              STREETER 207 248 (+41)    Bilateral CMC arthritis per report L worse than R      Sensation- slight complaints of hypersensitivity along scar adhesion      Strength (Dyanmometer) and Pinch  Strength (Pinch Gauge)  Measured in pounds and psi. Average of three trials.    11/17/2023 11/17/2023 1/24/24     Right  Left  Left    Rung II 78.9 44.6 57.5 (+12.9)   Key Pinch        3pt Pinch 10 7 nt   2pt Pinch              Treatment     Gee received the treatments listed below:     Supervised Modalities after being cleared for contradictions: Paraffin x 10 min to B hands to increase blood flow, circulation for B CMC arthritis and left MF laceration      Therapeutic Activities to improve functional performance for 20  minutes, including:  -Blocking FDP, FDS, isolated FDS, hook, wave, flat and full fist, digit ab/ad and digit extension x 10 reps each   - thumb ROM , Bilaterally for CMC arthritis, flex, opposition    - added yellow theraputty for gross , raking with MF, isolated FDS glide with middle finger, key, 2 pt pinch with MF/thumb, and thumb press for B hand strengthening x 10 reps each.   - added thumb adduction with putty   - added thumb extension and abduction with yellow rubberband ; Thumb flexion with rubberband  - added 2# clothespin for pinch strengthening for HEP or use of clothespin at home with rubberband to increase tension as needed .  - hook, wave, fist for left   -modality use for pain, stiffness, swelling     Manual therapy techniques: for 15 min (including Joint mobilizations, Myofacial release, Manual Lymphatic Drainage, Soft tissue Mobilization, and Friction Massage)  were applied to the B hands as follows:   - demonstrated/ performed DTM to B thumb, CMC's and thenar region with patient massage tool  to increase blood flow, circulation secondary to CMC arthritis L>R  -STM, scar massage to Left MF volar DIP crease and pulp to decrease adhesions     Patient Education and Home Exercises      Education provided:   - Cont HEP   -- Progress towards goals     Written Home Exercises Provided: Patient instructed to cont prior HEP.  Exercises were reviewed and Gee was able to demonstrate  them prior to the end of the session.  Gee demonstrated good  understanding of the HEP provided. See EMR under Patient Instructions for exercises provided during therapy sessions.       Assessment     Gee is progressing well towards his goals and there are no updates to goals at this time. Pt prognosis is Good. Complaints of left CMC pain > Right and continued numbness volar/dorsal mid P1 to fingertip of MF.  Mild adhesions noted. Increased ROM noted;  improved on left; decreased edema noted.     Pt will continue to benefit from skilled outpatient occupational therapy to address the deficits listed in the problem list on initial evaluation provide pt/family education and to maximize pt's level of independence in the home and community environment.     Pt's spiritual, cultural and educational needs considered and pt agreeable to plan of care and goals.    Anticipated barriers to occupational therapy: none     The following goals were discussed with the patient and patient is in agreement with them as to be addressed in the treatment plan.      Short Term Goals: (6 weeks):  1)  Patient to be IND with HEP and modalities for pain management  2)  Increase ROM 3-10 degrees to increase functional hand use for ADLs/work/leisure activities  3)   Decrease edema .1-.5mm to increase joint mobility /flexibility for functional hand use.   4)  Assess   and pinch and set goals accordingly      Long Term Goals :To be met by discharge (12 weeks)   1)  Increase  strength 2-8 lbs. For lifting furniture  2) Increase pinch strength 1-4 psi's for typing and FM activity   3)   Increase ROM 11-20 degrees to increase functional hand use for ADLs/work/leisure activities     Plan   Certification Period/Plan of care expiration: 11/17/2023 to 1/17/2024.        Theresa Layne, OT  , CHT

## 2024-01-31 ENCOUNTER — CLINICAL SUPPORT (OUTPATIENT)
Dept: REHABILITATION | Facility: HOSPITAL | Age: 59
End: 2024-01-31
Payer: COMMERCIAL

## 2024-01-31 DIAGNOSIS — M25.60 RANGE OF MOTION DEFICIT: Primary | ICD-10-CM

## 2024-01-31 DIAGNOSIS — M79.642 HAND PAIN, LEFT: ICD-10-CM

## 2024-01-31 PROCEDURE — 97140 MANUAL THERAPY 1/> REGIONS: CPT

## 2024-01-31 PROCEDURE — 97018 PARAFFIN BATH THERAPY: CPT | Mod: 59

## 2024-01-31 PROCEDURE — 97530 THERAPEUTIC ACTIVITIES: CPT

## 2024-01-31 NOTE — PROGRESS NOTES
[facility]  Occupational Therapy Treatment Note    Date: 1/31/2024  Name: Gee Montez  Clinic Number: [id number]    Therapy Diagnosis:        Encounter Diagnoses   Name Primary?    Injury of left hand, sequela      Range of motion deficit Yes    Hand pain, left           Medical Diagnosis: Left hand laceration  and B CMC arthritis   ICD-10: S69.92XS (ICD-10-CM) - Injury of left hand, sequela      Physician: Celina Myles MD  Physician Orders: Eval and Treat     Surgical Procedure and Date: n/a, n/a      Evaluation Date: 11/17/2023     Plan of Care Certification Period: 2/17/2024     Date of Return to MD: may      Visit # / Visits authorized: 6 / 7  Insurance Authorization Period Expiration: 12/31/23        Precautions:  Standard     Time In: 3  Time Out: 345  Total Appointment Time (timed & untimed codes): 45 minutes    SUBJECTIVE     Pt reports: Pain after pushing underwear down to go to bathroom, playing piano is uncomfortable.  Dropping of stuff.   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  May consider Hand MD consult from PCP for Left thumb pain.     He was compliant with home exercise program given last session.   Response to previous treatment:more motion  , less numbness  Functional change: played piano short duration     Pain: 2/10  Location: left middle finger, P1 volar, and finger tip, and B thumb CMC joint pain       OBJECTIVE     Objective Measures updated at progress report unless specified.  Edema. Measured in centimeters.    11/17/2023 1/24/24          Long:        P1 6.8 6.5 (-0.3)    PIP 6.9 6.4 (-0.5)   P2 6.4 5.6 (-0.8)    DIP 6.0 5.3 (-0.7)   P3 5.8 5.0 (-0.8)      Hand ROM. Measured in degrees.    11/17/2023 1/24/24                 Long:  MP 0/90 0/95              PIP 0/75 0/103              DIP 0/42 0/50              STREETER 207 248 (+41)    Bilateral CMC arthritis per report L worse than R      Sensation-  slight complaints of hypersensitivity along scar adhesion      Strength (Dyanmometer) and Pinch Strength (Pinch Gauge)  Measured in pounds and psi. Average of three trials.    11/17/2023 11/17/2023 1/24/24     Right  Left  Left    Rung II 78.9 44.6 57.5 (+12.9)   Key Pinch        3pt Pinch 10 7 nt   2pt Pinch              Treatment     Gee received the treatments listed below:     Supervised Modalities after being cleared for contradictions: Paraffin x 10 min to B hands to increase blood flow, circulation for B CMC arthritis and left MF laceration      Therapeutic Activities to improve functional performance for 20  minutes, including:  -Blocking FDP, FDS, isolated FDS, hook, wave, flat and full fist, digit ab/ad and digit extension x 10 reps each   - thumb ROM , Bilaterally for CMC arthritis, flex, opposition    - added yellow theraputty for gross , raking with MF, isolated FDS glide with middle finger, key, 2 pt pinch with MF/thumb, and thumb press for B hand strengthening x 10 reps each.   - added thumb adduction with putty   - added thumb extension and abduction with yellow rubberband ; Thumb flexion with rubberband  - added 2# clothespin for pinch strengthening for HEP or use of clothespin at home with rubberband to increase tension as needed .  - KT tape to left thumb in ribbon pattern x 2 with cross strap @ 50% stretch to provide support/stability for home and work activities; discussed OTC brace option to possible referral to hand surgeon to discuss left thumb pain.  Will provide additional resources as needed.   - hook, wave, fist for left   -modality use for pain, stiffness, swelling     Manual therapy techniques: for 15 min (including Joint mobilizations, Myofacial release, Manual Lymphatic Drainage, Soft tissue Mobilization, and Friction Massage)  were applied to the B hands as follows:   - demonstrated/ performed DTM to B thumb, CMC's and thenar region with patient massage tool  to increase  blood flow, circulation secondary to CMC arthritis L>R  -STM, scar massage to Left MF volar DIP crease and pulp to decrease adhesions     Patient Education and Home Exercises      Education provided:   - Cont HEP   -- Progress towards goals     Written Home Exercises Provided: Patient instructed to cont prior HEP.  Exercises were reviewed and Gee was able to demonstrate them prior to the end of the session.  Gee demonstrated good  understanding of the HEP provided. See EMR under Patient Instructions for exercises provided during therapy sessions.       Assessment     Gee is progressing well towards his goals and there are no updates to goals at this time. Pt prognosis is Good. Complaints of left CMC pain > Right and continued numbness volar/dorsal mid P1 to fingertip of MF, but slowly improving per report.  Mild adhesions noted. Increased ROM noted;  improved on left; decreased edema noted. L thumb pain appears as primary concern.     Pt will continue to benefit from skilled outpatient occupational therapy to address the deficits listed in the problem list on initial evaluation provide pt/family education and to maximize pt's level of independence in the home and community environment.     Pt's spiritual, cultural and educational needs considered and pt agreeable to plan of care and goals.    Anticipated barriers to occupational therapy: none     The following goals were discussed with the patient and patient is in agreement with them as to be addressed in the treatment plan.      Short Term Goals: (6 weeks):  1)  Patient to be IND with HEP and modalities for pain management- met  2)  Increase ROM 3-10 degrees to increase functional hand use for ADLs/work/leisure activities- met  3)   Decrease edema .1-.5mm to increase joint mobility /flexibility for functional hand use. - met  4)  Assess   and pinch and set goals accordingly - met     Long Term Goals :To be met by discharge (12 weeks)   1)   Increase  strength 2-8 lbs. For lifting furniture- met, continuing   2) Increase pinch strength 1-4 psi's for typing and FM activity   3)   Increase ROM 11-20 degrees to increase functional hand use for ADLs/work/leisure activities- met continuing      Plan   Certification Period/Plan of care expiration:11/17/2023 to 2/17/2024    I certify the need for these services furnished under the plan of treatment and while under my care.     ____________________________________________________________________  Physician/Referring Practitioner   Date of Signature           Theresa Layne OT  , CHT

## 2024-02-09 ENCOUNTER — CLINICAL SUPPORT (OUTPATIENT)
Dept: REHABILITATION | Facility: HOSPITAL | Age: 59
End: 2024-02-09
Payer: COMMERCIAL

## 2024-02-09 DIAGNOSIS — M25.60 RANGE OF MOTION DEFICIT: Primary | ICD-10-CM

## 2024-02-09 DIAGNOSIS — M79.642 HAND PAIN, LEFT: ICD-10-CM

## 2024-02-09 PROCEDURE — 97018 PARAFFIN BATH THERAPY: CPT | Mod: 59

## 2024-02-09 PROCEDURE — 97530 THERAPEUTIC ACTIVITIES: CPT

## 2024-02-09 PROCEDURE — 97140 MANUAL THERAPY 1/> REGIONS: CPT

## 2024-02-09 NOTE — PROGRESS NOTES
[facility] [place] [facility]  Occupational Therapy Treatment Note    Date: 2/9/2024  Name: Gee Montez  Clinic Number: [id number]    Therapy Diagnosis:        Encounter Diagnoses   Name Primary?    Injury of left hand, sequela      Range of motion deficit Yes    Hand pain, left           Medical Diagnosis: Left hand laceration  and B CMC arthritis   ICD-10: S69.92XS (ICD-10-CM) - Injury of left hand, sequela      Physician: Celina Myles MD  Physician Orders: Eval and Treat     Surgical Procedure and Date: n/a, n/a      Evaluation Date: 11/17/2023     Plan of Care Certification Period: 2/17/2024     Date of Return to MD: may      Visit # / Visits authorized: 7 / 10  Insurance Authorization Period Expiration: 12/31/24        Precautions:  Standard     Time In: 130  Time Out: 215  Total Appointment Time (timed & untimed codes): 45 minutes    SUBJECTIVE     Pt reports: Continued complaints of Pain after pushing underwear down to go to bathroom, playing piano is uncomfortable.  Dropping of stuff.   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  May consider Hand MD consult from PCP for Left thumb pain.     He was compliant with home exercise program given last session.   Response to previous treatment:more motion  , less numbness  Functional change: played piano short duration     Pain: 2/10  Location: left middle finger, P1 volar, and finger tip, and B thumb CMC joint pain       OBJECTIVE     Objective Measures updated at progress report unless specified.  Edema. Measured in centimeters.    11/17/2023 1/24/24          Long:        P1 6.8 6.5 (-0.3)    PIP 6.9 6.4 (-0.5)   P2 6.4 5.6 (-0.8)    DIP 6.0 5.3 (-0.7)   P3 5.8 5.0 (-0.8)      Hand ROM. Measured in degrees.    11/17/2023 1/24/24                 Long:  MP 0/90 0/95              PIP 0/75 0/103              DIP 0/42 0/50              STREETER 207 248 (+41)    Bilateral CMC arthritis per report L worse  than R      Sensation- slight complaints of hypersensitivity along scar adhesion      Strength (Dyanmometer) and Pinch Strength (Pinch Gauge)  Measured in pounds and psi. Average of three trials.    11/17/2023 11/17/2023 1/24/24     Right  Left  Left    Rung II 78.9 44.6 57.5 (+12.9)   Key Pinch        3pt Pinch 10 7 nt   2pt Pinch              Treatment     Gee received the treatments listed below:     Supervised Modalities after being cleared for contradictions: Paraffin x 10 min to B hands to increase blood flow, circulation for B CMC arthritis and left MF laceration      Therapeutic Activities to improve functional performance for 20  minutes, including:  -Blocking FDP, FDS, isolated FDS, hook, wave, flat and full fist, digit ab/ad and digit extension x 10 reps each   - thumb ROM , Bilaterally for CMC arthritis, flex, opposition    - added yellow theraputty for gross , raking with MF, isolated FDS glide with middle finger, key, 2 pt pinch with MF/thumb, and thumb press for B hand strengthening x 10 reps each.   - added thumb adduction with putty   - added thumb extension and abduction with yellow rubberband ; Thumb flexion with rubberband  - added 2# clothespin for pinch strengthening for HEP or use of clothespin at home with rubberband to increase tension as needed .  - KT tape to left thumb in ribbon pattern x 2 with cross strap @ 50% stretch to provide support/stability for home and work activities; discussed OTC brace option to possible referral to hand surgeon to discuss left thumb pain.  Will provide additional resources as needed.   - hook, wave, fist for left   -modality use for pain, stiffness, swelling     Manual therapy techniques: for 15 min (including Joint mobilizations, Myofacial release, Manual Lymphatic Drainage, Soft tissue Mobilization, and Friction Massage)  were applied to the B hands as follows:   - demonstrated/ performed DTM to B thumb, CMC's and thenar region with patient  massage tool  to increase blood flow, circulation secondary to CMC arthritis L>R  -STM, scar massage to Left MF volar DIP crease and pulp to decrease adhesions     Patient Education and Home Exercises      Education provided:   - Cont HEP   -- Progress towards goals     Written Home Exercises Provided: Patient instructed to cont prior HEP.  Exercises were reviewed and Gee was able to demonstrate them prior to the end of the session.  Gee demonstrated good  understanding of the HEP provided. See EMR under Patient Instructions for exercises provided during therapy sessions.       Assessment     Gee is progressing well towards his goals and there are no updates to goals at this time. Pt prognosis is Good. Complaints of left CMC pain > Right and continued numbness volar/dorsal mid P1 to fingertip of MF, but slowly improving per report.  Significant laxity and swan neck type deformity in left thumb noted.   Increased ROM in finger noted;  improved on left; decreased edema noted.   L thumb pain appears as primary concern. Patient may benefit from Hand Surgeon consult for Left thumb if interested.  Will complete remaining visits and progress as able.     Pt will continue to benefit from skilled outpatient occupational therapy to address the deficits listed in the problem list on initial evaluation provide pt/family education and to maximize pt's level of independence in the home and community environment.     Pt's spiritual, cultural and educational needs considered and pt agreeable to plan of care and goals.    Anticipated barriers to occupational therapy: none     The following goals were discussed with the patient and patient is in agreement with them as to be addressed in the treatment plan.      Short Term Goals: (6 weeks):  1)  Patient to be IND with HEP and modalities for pain management- met  2)  Increase ROM 3-10 degrees to increase functional hand use for ADLs/work/leisure activities- met  3)    Decrease edema .1-.5mm to increase joint mobility /flexibility for functional hand use. - met  4)  Assess   and pinch and set goals accordingly - met     Long Term Goals :To be met by discharge (12 weeks)   1)  Increase  strength 2-8 lbs. For lifting furniture- met, continuing   2) Increase pinch strength 1-4 psi's for typing and FM activity   3)   Increase ROM 11-20 degrees to increase functional hand use for ADLs/work/leisure activities- met continuing      Plan   Certification Period/Plan of care expiration:11/17/2023 to 2/17/2024    I certify the need for these services furnished under the plan of treatment and while under my care.     ____________________________________________________________________  Physician/Referring Practitioner   Date of Signature           Theresa Layne OT  , CHT

## 2024-02-15 ENCOUNTER — PATIENT MESSAGE (OUTPATIENT)
Dept: REHABILITATION | Facility: HOSPITAL | Age: 59
End: 2024-02-15

## 2024-02-23 ENCOUNTER — CLINICAL SUPPORT (OUTPATIENT)
Dept: REHABILITATION | Facility: HOSPITAL | Age: 59
End: 2024-02-23
Payer: COMMERCIAL

## 2024-02-23 DIAGNOSIS — M79.642 HAND PAIN, LEFT: ICD-10-CM

## 2024-02-23 DIAGNOSIS — M25.60 RANGE OF MOTION DEFICIT: Primary | ICD-10-CM

## 2024-02-23 PROCEDURE — 97018 PARAFFIN BATH THERAPY: CPT

## 2024-02-23 PROCEDURE — 97530 THERAPEUTIC ACTIVITIES: CPT

## 2024-02-23 PROCEDURE — 97140 MANUAL THERAPY 1/> REGIONS: CPT

## 2024-02-23 NOTE — PROGRESS NOTES
[facility]  Occupational Therapy Treatment Note    Date: 2/23/2024  Name: Gee Montez  Clinic Number: [id number]    Therapy Diagnosis:        Encounter Diagnoses   Name Primary?    Injury of left hand, sequela      Range of motion deficit Yes    Hand pain, left           Medical Diagnosis: Left hand laceration  and B CMC arthritis   ICD-10: S69.92XS (ICD-10-CM) - Injury of left hand, sequela      Physician: Celina Myles MD  Physician Orders: Eval and Treat     Surgical Procedure and Date: n/a, n/a      Evaluation Date: 11/17/2023     Plan of Care Certification Period: 4/17/2024     Date of Return to MD: may      Visit # / Visits authorized: 8 / 10  Insurance Authorization Period Expiration: 12/31/24        Precautions:  Standard     Time In: 10:45  Time Out: 1130  Total Appointment Time (timed & untimed codes): 45 minutes    SUBJECTIVE     Pt reports: I think I overdid it with my wrist (R) and moving furniture at work.  I'm using magnesium spray and it helped my thumb (L) ;  I think I'll just see how it does after my last visit.  May consider Hand MD consult from PCP for Left thumb pain.     He was compliant with home exercise program given last session.   Response to previous treatment:more motion  , less numbness  Functional change: played piano short duration     Pain: 2/10  Location: left middle finger, P1 volar, and finger tip, and B thumb CMC joint pain       OBJECTIVE     Objective Measures updated at progress report unless specified.    Edema. Measured in centimeters.    11/17/2023 1/24/24          Long:        P1 6.8 6.5 (-0.3)    PIP 6.9 6.4 (-0.5)   P2 6.4 5.6 (-0.8)    DIP 6.0 5.3 (-0.7)   P3 5.8 5.0 (-0.8)      Hand ROM. Measured in degrees.    11/17/2023 1/24/24                 Long:  MP 0/90 0/95              PIP 0/75 0/103              DIP 0/42 0/50              STREETER 207 248 (+41)    Bilateral CMC arthritis per report L worse than R      Sensation- slight complaints of  hypersensitivity along scar adhesion      Strength (Dyanmometer) and Pinch Strength (Pinch Gauge)  Measured in pounds and psi. Average of three trials.    11/17/2023 11/17/2023 1/24/24     Right  Left  Left    Rung II 78.9 44.6 57.5 (+12.9)   Key Pinch        3pt Pinch 10 7 nt   2pt Pinch              Treatment     Gee received the treatments listed below:     Supervised Modalities after being cleared for contradictions: Paraffin x 10 min to B hands to increase blood flow, circulation for B CMC arthritis and left MF laceration      Therapeutic Activities to improve functional performance for 20  minutes, including:  -Blocking FDP, FDS, isolated FDS, hook, wave, flat and full fist, digit ab/ad and digit extension x 10 reps each   - thumb ROM , Bilaterally for CMC arthritis, flex, opposition    - added yellow theraputty for gross , raking with MF, isolated FDS glide with middle finger, key, 2 pt pinch with MF/thumb, and thumb press for B hand strengthening x 10 reps each.   - wesley varigrip green 25 % for thumb press   - added thumb adduction with putty   - added thumb extension and abduction with yellow rubberband ; Thumb flexion with rubberband  - added 2# clothespin for pinch strengthening for HEP or use of clothespin at home with rubberband to increase tension as needed .  - KT tape to left thumb in ribbon pattern x 2 with cross strap @ 50% stretch to provide support/stability for home and work activities; discussed OTC brace option to possible referral to hand surgeon to discuss left thumb pain.  Will provide additional resources as needed.   - hook, wave, fist for left   -modality use for pain, stiffness, swelling     Manual therapy techniques: for 15 min (including Joint mobilizations, Myofacial release, Manual Lymphatic Drainage, Soft tissue Mobilization, and Friction Massage)  were applied to the B hands as follows:   - demonstrated/ performed DTM to B thumb, CMC's and thenar region with patient  massage tool  to increase blood flow, circulation secondary to CMC arthritis L>R  -STM, scar massage to Left MF volar DIP crease and pulp to decrease adhesions     Patient Education and Home Exercises      Education provided:   - Cont HEP   -- Progress towards goals     Written Home Exercises Provided: Patient instructed to cont prior HEP.  Exercises were reviewed and Gee was able to demonstrate them prior to the end of the session.  Gee demonstrated good  understanding of the HEP provided. See EMR under Patient Instructions for exercises provided during therapy sessions.       Assessment     Gee is progressing well towards his goals and there are no updates to goals at this time. Pt prognosis is Good. Complaints of left CMC pain > Right and continued numbness volar/dorsal mid P1 to fingertip of MF, but slowly improving per report.  Significant laxity and swan neck type deformity in left thumb noted.   Increased ROM in finger noted;  improved on left; decreased edema noted.   L thumb pain appears as primary concern. Patient may benefit from Hand Surgeon consult for Left thumb if interested.  Will complete remaining visits and progress as able.     Pt will continue to benefit from skilled outpatient occupational therapy to address the deficits listed in the problem list on initial evaluation provide pt/family education and to maximize pt's level of independence in the home and community environment.     Pt's spiritual, cultural and educational needs considered and pt agreeable to plan of care and goals.    Anticipated barriers to occupational therapy: none     The following goals were discussed with the patient and patient is in agreement with them as to be addressed in the treatment plan.      Short Term Goals: (6 weeks):  1)  Patient to be IND with HEP and modalities for pain management- met  2)  Increase ROM 3-10 degrees to increase functional hand use for ADLs/work/leisure activities- met  3)    Decrease edema .1-.5mm to increase joint mobility /flexibility for functional hand use. - met  4)  Assess   and pinch and set goals accordingly - met     Long Term Goals :To be met by discharge (12 weeks)   1)  Increase  strength 2-8 lbs. For lifting furniture- met, continuing   2) Increase pinch strength 1-4 psi's for typing and FM activity   3)   Increase ROM 11-20 degrees to increase functional hand use for ADLs/work/leisure activities- met continuing      Plan   Certification Period/Plan of care expiration:2/17/2024 to 4/17/2024    I certify the need for these services furnished under the plan of treatment and while under my care.     ____________________________________________________________________  Physician/Referring Practitioner   Date of Signature       Theresa Layne OT  , CHT

## 2024-03-08 DIAGNOSIS — G89.29 CHRONIC BILATERAL LOW BACK PAIN WITHOUT SCIATICA: ICD-10-CM

## 2024-03-08 DIAGNOSIS — M54.50 CHRONIC BILATERAL LOW BACK PAIN WITHOUT SCIATICA: ICD-10-CM

## 2024-03-08 RX ORDER — TRAMADOL HYDROCHLORIDE 50 MG/1
TABLET ORAL
Qty: 30 TABLET | Refills: 0 | Status: SHIPPED | OUTPATIENT
Start: 2024-03-08 | End: 2024-05-08 | Stop reason: SDUPTHER

## 2024-03-08 NOTE — TELEPHONE ENCOUNTER
No care due was identified.  [facility] Embedded Care Due Messages. Reference number: [id number].   3/08/2024 12:12:49 PM CST

## 2024-04-12 ENCOUNTER — PATIENT MESSAGE (OUTPATIENT)
Dept: INTERNAL MEDICINE | Facility: CLINIC | Age: 59
End: 2024-04-12
Payer: COMMERCIAL

## 2024-05-08 ENCOUNTER — OFFICE VISIT (OUTPATIENT)
Dept: INTERNAL MEDICINE | Facility: CLINIC | Age: 59
End: 2024-05-08
Payer: COMMERCIAL

## 2024-05-08 VITALS
WEIGHT: 201.06 LBS | HEART RATE: 80 BPM | HEIGHT: 70 IN | SYSTOLIC BLOOD PRESSURE: 128 MMHG | OXYGEN SATURATION: 97 % | DIASTOLIC BLOOD PRESSURE: 84 MMHG | BODY MASS INDEX: 28.78 KG/M2

## 2024-05-08 DIAGNOSIS — Z78.9 HEPATITIS B VACCINATION STATUS UNKNOWN: ICD-10-CM

## 2024-05-08 DIAGNOSIS — M65.9 TENOSYNOVITIS OF BOTH WRISTS: ICD-10-CM

## 2024-05-08 DIAGNOSIS — G89.29 CHRONIC BILATERAL LOW BACK PAIN WITHOUT SCIATICA: ICD-10-CM

## 2024-05-08 DIAGNOSIS — I10 ESSENTIAL HYPERTENSION: Primary | ICD-10-CM

## 2024-05-08 DIAGNOSIS — M54.50 CHRONIC BILATERAL LOW BACK PAIN WITHOUT SCIATICA: ICD-10-CM

## 2024-05-08 DIAGNOSIS — E78.2 MIXED HYPERLIPIDEMIA: ICD-10-CM

## 2024-05-08 DIAGNOSIS — R73.03 PREDIABETES: ICD-10-CM

## 2024-05-08 PROCEDURE — 99999 PR PBB SHADOW E&M-EST. PATIENT-LVL IV: CPT | Mod: PBBFAC,,, | Performed by: INTERNAL MEDICINE

## 2024-05-08 PROCEDURE — 3074F SYST BP LT 130 MM HG: CPT | Mod: CPTII,S$GLB,, | Performed by: INTERNAL MEDICINE

## 2024-05-08 PROCEDURE — 1159F MED LIST DOCD IN RCRD: CPT | Mod: CPTII,S$GLB,, | Performed by: INTERNAL MEDICINE

## 2024-05-08 PROCEDURE — 1160F RVW MEDS BY RX/DR IN RCRD: CPT | Mod: CPTII,S$GLB,, | Performed by: INTERNAL MEDICINE

## 2024-05-08 PROCEDURE — 99214 OFFICE O/P EST MOD 30 MIN: CPT | Mod: S$GLB,,, | Performed by: INTERNAL MEDICINE

## 2024-05-08 PROCEDURE — 3008F BODY MASS INDEX DOCD: CPT | Mod: CPTII,S$GLB,, | Performed by: INTERNAL MEDICINE

## 2024-05-08 PROCEDURE — 3079F DIAST BP 80-89 MM HG: CPT | Mod: CPTII,S$GLB,, | Performed by: INTERNAL MEDICINE

## 2024-05-08 PROCEDURE — 3044F HG A1C LEVEL LT 7.0%: CPT | Mod: CPTII,S$GLB,, | Performed by: INTERNAL MEDICINE

## 2024-05-08 RX ORDER — TRAMADOL HYDROCHLORIDE 50 MG/1
TABLET ORAL
Qty: 30 TABLET | Refills: 2 | Status: SHIPPED | OUTPATIENT
Start: 2024-05-08

## 2024-05-08 NOTE — PROGRESS NOTES
INTERNAL MEDICINE ESTABLISHED PATIENT VISIT NOTE    Subjective:     Chief Complaint: Follow-up  HLD, preDM     Patient ID: Gee Montez is a 58 y.o. male with HLD, preDM now resolved, ?hx PVCs on bystolic, chronic neck and back pain, hx kidney stones, hx palpitations (seen by Cards c neg w/u including EKG, echo, and Holter), GERD, Vit D def, last seen by me in Nov for his annual exam, here today for f/u HLD.    Today also c c/o B wrist pain, R>L.  Has been doing OT and wearing splints c minimal relief.    Some weight gain since last visit as his wrist issues have limited some exercises.      Past Medical History:  Past Medical History:   Diagnosis Date    Chronic neck pain     Frequent PVCs     Hyperlipidemia        Home Medications:  Prior to Admission medications    Medication Sig Start Date End Date Taking? Authorizing Provider   traMADoL (ULTRAM) 50 mg tablet TAKE 1 TABLET BY MOUTH NIGHTLY AS NEEDED FOR PAIN. 3/8/24   Celina Myles MD       Allergies:  Review of patient's allergies indicates:   Allergen Reactions    Adhes. band-tape-benzalkonium     Adhesive tape-silicones     Aspirin      Hurts stomach    Benadryl itch relief Nausea Only and Palpitations       Social History:  Social History     Tobacco Use    Smoking status: Former     Current packs/day: 0.00     Types: Cigarettes     Quit date: 3/1/1998     Years since quittin.2    Smokeless tobacco: Never   Substance Use Topics    Alcohol use: Yes     Alcohol/week: 5.0 standard drinks of alcohol     Types: 5 Glasses of wine per week     Comment: 2-3 drinks on weekend days, 5 drinks per week    Drug use: Yes        Review of Systems   Constitutional:  Positive for activity change. Negative for unexpected weight change.   HENT:  Negative for hearing loss, rhinorrhea and trouble swallowing.    Eyes:  Negative for discharge and visual disturbance.   Respiratory:  Negative for chest tightness and wheezing.    Cardiovascular:  Negative for chest pain and  "palpitations.   Gastrointestinal:  Negative for blood in stool, constipation, diarrhea and vomiting.   Endocrine: Negative for polydipsia and polyuria.   Genitourinary:  Negative for difficulty urinating, hematuria and urgency.   Musculoskeletal:  Positive for arthralgias, joint swelling and neck pain.   Neurological:  Negative for weakness and headaches.   Psychiatric/Behavioral:  Negative for confusion and dysphoric mood.          Health Maintenance:     Immunizations:   Influenza 11/2023  TDap 7/2017  Prevnar 13 rec at 64  Shingrix 12/2022, 3/2023  COVID vaccines completed 3/2021, 4/2021, rec booster now (Pfizer)   Hep B x2 completed in the past, given booster at last visit since last titers showed undetectable s Ab, can repeat titers today.     Cancer Screening:  Colonoscopy:  3/2020 c hemorrhoids s/p banding, no polyps, rec repeat in 10 yrs  PSA 10/2023 wnl    Objective:   /84 (BP Location: Left arm, Patient Position: Sitting, BP Method: Large (Manual))   Pulse 80   Ht 5' 10" (1.778 m)   Wt 91.2 kg (201 lb 1 oz)   SpO2 97%   BMI 28.85 kg/m²        General: AAO x3, no apparent distress  HEENT: no cervical LAD, no thyroid masses appreciated  CV: RRR, no m/r/g  Pulm: Lungs CTAB, no crackles, no wheezes  Abd: s/NT/ND +BS  Extremities: no c/c/e    Labs:     Lab Results   Component Value Date    WBC 4.84 10/31/2023    HGB 15.8 10/31/2023    HCT 47.9 10/31/2023    MCV 90 10/31/2023     10/31/2023     Sodium   Date Value Ref Range Status   05/09/2024 140 136 - 145 mmol/L Final     Potassium   Date Value Ref Range Status   05/09/2024 4.2 3.5 - 5.1 mmol/L Final     Chloride   Date Value Ref Range Status   05/09/2024 106 95 - 110 mmol/L Final     CO2   Date Value Ref Range Status   05/09/2024 27 23 - 29 mmol/L Final     Glucose   Date Value Ref Range Status   05/09/2024 99 70 - 110 mg/dL Final     BUN   Date Value Ref Range Status   05/09/2024 15 6 - 20 mg/dL Final     Creatinine   Date Value Ref Range " Status   05/09/2024 0.9 0.5 - 1.4 mg/dL Final     Calcium   Date Value Ref Range Status   05/09/2024 8.9 8.7 - 10.5 mg/dL Final     Total Protein   Date Value Ref Range Status   05/09/2024 7.1 6.0 - 8.4 g/dL Final     Albumin   Date Value Ref Range Status   05/09/2024 4.3 3.5 - 5.2 g/dL Final     Total Bilirubin   Date Value Ref Range Status   05/09/2024 0.5 0.1 - 1.0 mg/dL Final     Comment:     For infants and newborns, interpretation of results should be based  on gestational age, weight and in agreement with clinical  observations.    Premature Infant recommended reference ranges:  Up to 24 hours.............<8.0 mg/dL  Up to 48 hours............<12.0 mg/dL  3-5 days..................<15.0 mg/dL  6-29 days.................<15.0 mg/dL       Alkaline Phosphatase   Date Value Ref Range Status   05/09/2024 39 (L) 55 - 135 U/L Final     AST   Date Value Ref Range Status   05/09/2024 27 10 - 40 U/L Final     ALT   Date Value Ref Range Status   05/09/2024 27 10 - 44 U/L Final     Anion Gap   Date Value Ref Range Status   05/09/2024 7 (L) 8 - 16 mmol/L Final     eGFR if    Date Value Ref Range Status   03/18/2022 >60.0 >60 mL/min/1.73 m^2 Final     eGFR if non    Date Value Ref Range Status   03/18/2022 >60.0 >60 mL/min/1.73 m^2 Final     Comment:     Calculation used to obtain the estimated glomerular filtration  rate (eGFR) is the CKD-EPI equation.        Lab Results   Component Value Date    HGBA1C 5.7 (H) 05/09/2024     Lab Results   Component Value Date    LDLCALC 111.6 05/09/2024     Lab Results   Component Value Date    TSH 1.819 11/03/2021         Assessment/Plan     Gee was seen today for follow-up.    Diagnoses and all orders for this visit:    Essential hypertension  At goal, diet controlled  Recommend low sodium diet, < 2g Na/day as well as weight loss and exercise.  -     Comprehensive Metabolic Panel; Future    Mixed hyperlipidemia  Lab Results   Component Value Date     LDLCALC 111.6 05/09/2024     Diet controlled  Rec low fat diet.  Recommend weight loss with diet and exercise.  -     Lipid Panel; Future    Prediabetes     Normal on last check     Will repeat now since recent weight gain      -     Hemoglobin A1C; Future    Hepatitis B vaccination status unknown  Completed booster  -     HEPATITIS B SURFACE ANTIBODY, QUAL/QUANT; Future    Chronic bilateral low back pain without sciatica  Refilled, 30 with 2 refills to last 6 mos.  -     traMADoL (ULTRAM) 50 mg tablet; TAKE 1 TABLET BY MOUTH NIGHTLY AS NEEDED FOR PAIN.    Tenosynovitis of both wrists  -     Ambulatory referral/consult to Hand Surgery; Future        HM as above  RTC in 6 mos, sooner if needed.  Fasting labs tomorrow.    Celina Myles MD  Department of Internal Medicine - [facility]  05/10/2024

## 2024-05-09 ENCOUNTER — LAB VISIT (OUTPATIENT)
Dept: LAB | Facility: OTHER | Age: 59
End: 2024-05-09
Payer: COMMERCIAL

## 2024-05-09 DIAGNOSIS — I10 ESSENTIAL HYPERTENSION: ICD-10-CM

## 2024-05-09 DIAGNOSIS — Z78.9 HEPATITIS B VACCINATION STATUS UNKNOWN: ICD-10-CM

## 2024-05-09 DIAGNOSIS — E78.2 MIXED HYPERLIPIDEMIA: ICD-10-CM

## 2024-05-09 DIAGNOSIS — R73.03 PREDIABETES: ICD-10-CM

## 2024-05-09 LAB
ALBUMIN SERPL BCP-MCNC: 4.3 G/DL (ref 3.5–5.2)
ALP SERPL-CCNC: 39 U/L (ref 55–135)
ALT SERPL W/O P-5'-P-CCNC: 27 U/L (ref 10–44)
ANION GAP SERPL CALC-SCNC: 7 MMOL/L (ref 8–16)
AST SERPL-CCNC: 27 U/L (ref 10–40)
BILIRUB SERPL-MCNC: 0.5 MG/DL (ref 0.1–1)
BUN SERPL-MCNC: 15 MG/DL (ref 6–20)
CALCIUM SERPL-MCNC: 8.9 MG/DL (ref 8.7–10.5)
CHLORIDE SERPL-SCNC: 106 MMOL/L (ref 95–110)
CHOLEST SERPL-MCNC: 205 MG/DL (ref 120–199)
CHOLEST/HDLC SERPL: 2.6 {RATIO} (ref 2–5)
CO2 SERPL-SCNC: 27 MMOL/L (ref 23–29)
CREAT SERPL-MCNC: 0.9 MG/DL (ref 0.5–1.4)
EST. GFR  (NO RACE VARIABLE): >60 ML/MIN/1.73 M^2
ESTIMATED AVG GLUCOSE: 117 MG/DL (ref 68–131)
GLUCOSE SERPL-MCNC: 99 MG/DL (ref 70–110)
HBA1C MFR BLD: 5.7 % (ref 4–5.6)
HDLC SERPL-MCNC: 79 MG/DL (ref 40–75)
HDLC SERPL: 38.5 % (ref 20–50)
LDLC SERPL CALC-MCNC: 111.6 MG/DL (ref 63–159)
NONHDLC SERPL-MCNC: 126 MG/DL
POTASSIUM SERPL-SCNC: 4.2 MMOL/L (ref 3.5–5.1)
PROT SERPL-MCNC: 7.1 G/DL (ref 6–8.4)
SODIUM SERPL-SCNC: 140 MMOL/L (ref 136–145)
TRIGL SERPL-MCNC: 72 MG/DL (ref 30–150)

## 2024-05-09 PROCEDURE — 80061 LIPID PANEL: CPT | Performed by: INTERNAL MEDICINE

## 2024-05-09 PROCEDURE — 80053 COMPREHEN METABOLIC PANEL: CPT | Performed by: INTERNAL MEDICINE

## 2024-05-09 PROCEDURE — 36415 COLL VENOUS BLD VENIPUNCTURE: CPT | Performed by: INTERNAL MEDICINE

## 2024-05-09 PROCEDURE — 83036 HEMOGLOBIN GLYCOSYLATED A1C: CPT | Performed by: INTERNAL MEDICINE

## 2024-05-09 PROCEDURE — 86706 HEP B SURFACE ANTIBODY: CPT | Performed by: INTERNAL MEDICINE

## 2024-05-11 LAB
HBV SURFACE AB SER QL IA: POSITIVE
HBV SURFACE AB SERPL IA-ACNC: 330 MIU/ML

## 2024-05-13 DIAGNOSIS — M25.531 BILATERAL WRIST PAIN: Primary | ICD-10-CM

## 2024-05-13 DIAGNOSIS — M25.532 BILATERAL WRIST PAIN: Primary | ICD-10-CM

## 2024-05-30 ENCOUNTER — TELEPHONE (OUTPATIENT)
Dept: ORTHOPEDICS | Facility: CLINIC | Age: 59
End: 2024-05-30
Payer: COMMERCIAL

## 2024-05-30 NOTE — TELEPHONE ENCOUNTER
Spoke c pt. Confirmed appt with xray Dr. Mckeon. Patient expressed understanding & was thankful.

## 2024-06-06 ENCOUNTER — HOSPITAL ENCOUNTER (OUTPATIENT)
Dept: RADIOLOGY | Facility: OTHER | Age: 59
Discharge: HOME | End: 2024-06-06
Attending: ORTHOPAEDIC SURGERY
Payer: COMMERCIAL

## 2024-06-06 ENCOUNTER — OFFICE VISIT (OUTPATIENT)
Dept: ORTHOPEDICS | Facility: CLINIC | Age: 59
End: 2024-06-06
Payer: COMMERCIAL

## 2024-06-06 VITALS — BODY MASS INDEX: 28.78 KG/M2 | HEIGHT: 70 IN | WEIGHT: 201.06 LBS

## 2024-06-06 DIAGNOSIS — M25.532 BILATERAL WRIST PAIN: ICD-10-CM

## 2024-06-06 DIAGNOSIS — S63.102A SUBLUXATION OF LEFT THUMB, INITIAL ENCOUNTER: Primary | ICD-10-CM

## 2024-06-06 DIAGNOSIS — S63.101A: ICD-10-CM

## 2024-06-06 DIAGNOSIS — M18.0 ARTHRITIS OF CARPOMETACARPAL (CMC) JOINT OF BOTH THUMBS: ICD-10-CM

## 2024-06-06 DIAGNOSIS — M25.531 BILATERAL WRIST PAIN: ICD-10-CM

## 2024-06-06 PROCEDURE — 1159F MED LIST DOCD IN RCRD: CPT | Mod: CPTII,S$GLB,,

## 2024-06-06 PROCEDURE — 1160F RVW MEDS BY RX/DR IN RCRD: CPT | Mod: CPTII,S$GLB,,

## 2024-06-06 PROCEDURE — 73110 X-RAY EXAM OF WRIST: CPT | Mod: 26,50,, | Performed by: RADIOLOGY

## 2024-06-06 PROCEDURE — 3044F HG A1C LEVEL LT 7.0%: CPT | Mod: CPTII,S$GLB,,

## 2024-06-06 PROCEDURE — 99204 OFFICE O/P NEW MOD 45 MIN: CPT | Mod: S$GLB,,,

## 2024-06-06 PROCEDURE — 73110 X-RAY EXAM OF WRIST: CPT | Mod: TC,50,FY

## 2024-06-06 PROCEDURE — 3008F BODY MASS INDEX DOCD: CPT | Mod: CPTII,S$GLB,,

## 2024-06-06 PROCEDURE — 99999 PR PBB SHADOW E&M-EST. PATIENT-LVL III: CPT | Mod: PBBFAC,,,

## 2024-06-06 NOTE — PROGRESS NOTES
Subjective:      Patient ID: Gee Montez is a 58 y.o. male.    Chief Complaint: Pain of the Left Wrist and Pain of the Right Wrist      HPI  Gee Montez is a right hand dominant 58 y.o. male presenting today for bilateral thumb pain. There was not a history of trauma. Onset of symptoms began 5-6 months ago, he noticed he started dropping things and he strength was decreasing. He saw Dr Bain and referred him to therapy, he has attended 7 visits.  On October 3rd he cut his left long finger and needed stitched so [facility] and they did a digital block, he states he continues to have pain over the volar aspect of his long finger. He also states he has bilateral thumb base pain. He plays the piano and is not intereste din any surgeries at this time. He also states that his right index mcp joint starting hurting when the leash from his dog pulled his finger. Denies any numbness or tingling.     Patient is a .   Review of patient's allergies indicates:   Allergen Reactions    Adhes. band-tape-benzalkonium     Adhesive tape-silicones     Aspirin      Hurts stomach    Benadryl itch relief Nausea Only and Palpitations         Current Outpatient Medications   Medication Sig Dispense Refill    traMADoL (ULTRAM) 50 mg tablet TAKE 1 TABLET BY MOUTH NIGHTLY AS NEEDED FOR PAIN. 30 tablet 2     No current facility-administered medications for this visit.       Past Medical History:   Diagnosis Date    Chronic neck pain     Frequent PVCs     Hyperlipidemia        Past Surgical History:   Procedure Laterality Date    COLONOSCOPY N/A 3/20/2020    Procedure: COLONOSCOPY;  Surgeon: Korey Arteaga MD;  Location: [facility] ([address] [place] [address]);  Service: Endoscopy;  Laterality: N/A;  3/16 - confirmed case and new arrival time and patient aware of new policies-   3/18/20 - confirmed new arrival time on 0630, drop off policy reviewed, 2nd half prep completed from [id number], Pt verbalized understanding-  "[facility]  3/19/20-patient notified of updated drop o    ESOPHAGOGASTRODUODENOSCOPY N/A 3/28/2022    Procedure: EGD (ESOPHAGOGASTRODUODENOSCOPY);  Surgeon: Orlando Mckenzie MD;  Location: [facility] ([address] [place] [address]);  Service: Endoscopy;  Laterality: N/A;  fully vaccinated       Review of Systems:  Constitutional: Negative for chills and fever.   Respiratory: Negative for cough and shortness of breath.    Gastrointestinal: Negative for nausea and vomiting.   Skin: Negative for rash.   Neurological: Negative for dizziness and headaches.   Psychiatric/Behavioral: Negative for depression.   MSK as in HPI       OBJECTIVE:     PHYSICAL EXAM:  Ht 5' 10" (1.778 m)   Wt 91.2 kg (201 lb 1 oz)   BMI 28.85 kg/m²     GEN:  NAD, well-developed, well-groomed.  NEURO: Awake, alert, and oriented. Normal attention and concentration.    PSYCH: Normal mood and affect. Behavior is normal.  HEENT: No cervical lymphadenopathy noted.  CARDIOVASCULAR: Radial pulses 2+ bilaterally. No LE edema noted.  PULMONARY: Breath sounds normal. No respiratory distress.  SKIN: Intact, no rashes.      MSK:     RUE:  Good active ROM of the wrist and fingers. AIN/PIN/Radial/Median/Ulnar Nerves assessed in isolation without deficit. Radial & Ulnar arteries palpated 2+. Capillary Refill <3s.  TTP over the scaphoid  TTP over thr radial side of the MCP joint of the 1st MCP joint  Atrophy noted in the thenar eminence  Positive grind test  LUE:  Good active ROM of the wrist and fingers. AIN/PIN/Radial/Median/Ulnar Nerves assessed in isolation without deficit. Radial & Ulnar arteries palpated 2+. Capillary Refill <3s.  Healed scar at ulnar aspect DIPJ left ling finger. Flex/extensor tendons intact.  TTP over the scaphoid   Positive grind test  Atrophy noted in the thenar eminence    RADIOGRAPHS:  No acute fracture or dislocation seen.     Osteophyte formation and joint space narrowing 1st CMC joint, left hand greater than right.     No significant soft tissue edema.   "   Comments: I have personally reviewed the imaging and I agree with the above radiologist's report.    ASSESSMENT/PLAN:       ICD-10-CM ICD-9-CM   1. Subluxation of left thumb, initial encounter  S63.102A 834.00   2. Arthritis of carpometacarpal (CMC) joint of both thumbs  M18.0 716.94   3. Subluxation of right thumb, initial encounter  S63.101A 834.00       Orders Placed This Encounter    WRIST BRACE FOR HOME USE     Orders Placed This Encounter   Procedures    WRIST BRACE FOR HOME USE     Gee was seen today for pain and pain.    Diagnoses and all orders for this visit:    Subluxation of left thumb, initial encounter  -     WRIST BRACE FOR HOME USE    Arthritis of carpometacarpal (CMC) joint of both thumbs  -     Ambulatory referral/consult to Hand Surgery    Subluxation of right thumb, initial encounter  -     WRIST BRACE FOR HOME USE          Plan:   XRAY reviewed with patient today . Bilateral CMC arthritis noted with subluxation.   Bilateral metagrip bracing today   He wanted to review pricing for CMC injections before proceeding   RTC PRN      The patient indicates understanding of these issues and agrees to the plan.

## 2024-06-10 ENCOUNTER — PATIENT MESSAGE (OUTPATIENT)
Dept: INTERNAL MEDICINE | Facility: CLINIC | Age: 59
End: 2024-06-10
Payer: COMMERCIAL

## 2024-10-12 NOTE — TELEPHONE ENCOUNTER
Hi, I just looked at my coding and I think I coded it all as part of an annual checkup except for the urine test which is related to his neck and back pains and Tramadol prescription.    please let the patient know that he can call our central pricing office at [phone] for any further questions.    I am sorry about the inconvenience for him.    Let me know if patient has any questions.  Thank you, Omar Arredondo            used

## 2024-11-07 ENCOUNTER — PATIENT MESSAGE (OUTPATIENT)
Dept: INTERNAL MEDICINE | Facility: CLINIC | Age: 59
End: 2024-11-07
Payer: COMMERCIAL

## 2024-11-07 DIAGNOSIS — I10 ESSENTIAL HYPERTENSION: ICD-10-CM

## 2024-11-07 DIAGNOSIS — Z00.00 ANNUAL PHYSICAL EXAM: Primary | ICD-10-CM

## 2024-11-07 DIAGNOSIS — Z12.5 SCREENING PSA (PROSTATE SPECIFIC ANTIGEN): ICD-10-CM

## 2024-11-07 DIAGNOSIS — R73.03 PREDIABETES: ICD-10-CM

## 2024-11-07 DIAGNOSIS — E78.2 MIXED HYPERLIPIDEMIA: ICD-10-CM

## 2024-11-11 ENCOUNTER — LAB VISIT (OUTPATIENT)
Dept: LAB | Facility: OTHER | Age: 59
End: 2024-11-11
Attending: NURSE PRACTITIONER
Payer: COMMERCIAL

## 2024-11-11 DIAGNOSIS — Z12.5 SCREENING PSA (PROSTATE SPECIFIC ANTIGEN): ICD-10-CM

## 2024-11-11 DIAGNOSIS — E78.2 MIXED HYPERLIPIDEMIA: ICD-10-CM

## 2024-11-11 DIAGNOSIS — Z00.00 ANNUAL PHYSICAL EXAM: ICD-10-CM

## 2024-11-11 DIAGNOSIS — I10 ESSENTIAL HYPERTENSION: ICD-10-CM

## 2024-11-11 DIAGNOSIS — R73.03 PREDIABETES: ICD-10-CM

## 2024-11-11 LAB
ALBUMIN SERPL BCP-MCNC: 4.5 G/DL (ref 3.5–5.2)
ALP SERPL-CCNC: 43 U/L (ref 40–150)
ALT SERPL W/O P-5'-P-CCNC: 28 U/L (ref 10–44)
ANION GAP SERPL CALC-SCNC: 8 MMOL/L (ref 8–16)
AST SERPL-CCNC: 25 U/L (ref 10–40)
BASOPHILS # BLD AUTO: 0.06 K/UL (ref 0–0.2)
BASOPHILS NFR BLD: 1 % (ref 0–1.9)
BILIRUB SERPL-MCNC: 0.7 MG/DL (ref 0.1–1)
BUN SERPL-MCNC: 12 MG/DL (ref 6–20)
CALCIUM SERPL-MCNC: 9.2 MG/DL (ref 8.7–10.5)
CHLORIDE SERPL-SCNC: 106 MMOL/L (ref 95–110)
CHOLEST SERPL-MCNC: 222 MG/DL (ref 120–199)
CHOLEST/HDLC SERPL: 3.2 {RATIO} (ref 2–5)
CO2 SERPL-SCNC: 27 MMOL/L (ref 23–29)
COMPLEXED PSA SERPL-MCNC: 0.78 NG/ML (ref 0–4)
CREAT SERPL-MCNC: 0.9 MG/DL (ref 0.5–1.4)
DIFFERENTIAL METHOD BLD: NORMAL
EOSINOPHIL # BLD AUTO: 0.3 K/UL (ref 0–0.5)
EOSINOPHIL NFR BLD: 5.5 % (ref 0–8)
ERYTHROCYTE [DISTWIDTH] IN BLOOD BY AUTOMATED COUNT: 13.6 % (ref 11.5–14.5)
EST. GFR  (NO RACE VARIABLE): >60 ML/MIN/1.73 M^2
ESTIMATED AVG GLUCOSE: 117 MG/DL (ref 68–131)
GLUCOSE SERPL-MCNC: 104 MG/DL (ref 70–110)
HBA1C MFR BLD: 5.7 % (ref 4–5.6)
HCT VFR BLD AUTO: 50.8 % (ref 40–54)
HDLC SERPL-MCNC: 70 MG/DL (ref 40–75)
HDLC SERPL: 31.5 % (ref 20–50)
HGB BLD-MCNC: 17 G/DL (ref 14–18)
IMM GRANULOCYTES # BLD AUTO: 0.02 K/UL (ref 0–0.04)
IMM GRANULOCYTES NFR BLD AUTO: 0.3 % (ref 0–0.5)
LDLC SERPL CALC-MCNC: 123 MG/DL (ref 63–159)
LYMPHOCYTES # BLD AUTO: 1.9 K/UL (ref 1–4.8)
LYMPHOCYTES NFR BLD: 32.1 % (ref 18–48)
MCH RBC QN AUTO: 29.5 PG (ref 27–31)
MCHC RBC AUTO-ENTMCNC: 33.5 G/DL (ref 32–36)
MCV RBC AUTO: 88 FL (ref 82–98)
MONOCYTES # BLD AUTO: 0.6 K/UL (ref 0.3–1)
MONOCYTES NFR BLD: 10.4 % (ref 4–15)
NEUTROPHILS # BLD AUTO: 2.9 K/UL (ref 1.8–7.7)
NEUTROPHILS NFR BLD: 50.7 % (ref 38–73)
NONHDLC SERPL-MCNC: 152 MG/DL
NRBC BLD-RTO: 0 /100 WBC
PLATELET # BLD AUTO: 202 K/UL (ref 150–450)
PMV BLD AUTO: 9.5 FL (ref 9.2–12.9)
POTASSIUM SERPL-SCNC: 4.6 MMOL/L (ref 3.5–5.1)
PROT SERPL-MCNC: 7.3 G/DL (ref 6–8.4)
RBC # BLD AUTO: 5.76 M/UL (ref 4.6–6.2)
SODIUM SERPL-SCNC: 141 MMOL/L (ref 136–145)
TRIGL SERPL-MCNC: 145 MG/DL (ref 30–150)
TSH SERPL DL<=0.005 MIU/L-ACNC: 2.04 UIU/ML (ref 0.4–4)
WBC # BLD AUTO: 5.77 K/UL (ref 3.9–12.7)

## 2024-11-11 PROCEDURE — 80053 COMPREHEN METABOLIC PANEL: CPT | Performed by: NURSE PRACTITIONER

## 2024-11-11 PROCEDURE — 83036 HEMOGLOBIN GLYCOSYLATED A1C: CPT | Performed by: NURSE PRACTITIONER

## 2024-11-11 PROCEDURE — 84443 ASSAY THYROID STIM HORMONE: CPT | Performed by: NURSE PRACTITIONER

## 2024-11-11 PROCEDURE — 84153 ASSAY OF PSA TOTAL: CPT | Performed by: NURSE PRACTITIONER

## 2024-11-11 PROCEDURE — 80061 LIPID PANEL: CPT | Performed by: NURSE PRACTITIONER

## 2024-11-11 PROCEDURE — 85025 COMPLETE CBC W/AUTO DIFF WBC: CPT | Performed by: NURSE PRACTITIONER

## 2024-11-14 ENCOUNTER — HOSPITAL ENCOUNTER (OUTPATIENT)
Dept: RADIOLOGY | Facility: HOSPITAL | Age: 59
Discharge: HOME | End: 2024-11-14
Attending: INTERNAL MEDICINE
Payer: COMMERCIAL

## 2024-11-14 ENCOUNTER — IMMUNIZATION (OUTPATIENT)
Dept: INTERNAL MEDICINE | Facility: CLINIC | Age: 59
End: 2024-11-14
Payer: COMMERCIAL

## 2024-11-14 ENCOUNTER — OFFICE VISIT (OUTPATIENT)
Dept: INTERNAL MEDICINE | Facility: CLINIC | Age: 59
End: 2024-11-14
Payer: COMMERCIAL

## 2024-11-14 VITALS
SYSTOLIC BLOOD PRESSURE: 124 MMHG | BODY MASS INDEX: 28.53 KG/M2 | HEIGHT: 70 IN | HEART RATE: 79 BPM | DIASTOLIC BLOOD PRESSURE: 64 MMHG | OXYGEN SATURATION: 99 % | WEIGHT: 199.31 LBS

## 2024-11-14 DIAGNOSIS — G89.29 CHRONIC NECK PAIN: ICD-10-CM

## 2024-11-14 DIAGNOSIS — M54.2 CHRONIC NECK PAIN: ICD-10-CM

## 2024-11-14 DIAGNOSIS — R73.03 PREDIABETES: ICD-10-CM

## 2024-11-14 DIAGNOSIS — N20.0 NEPHROLITHIASIS: ICD-10-CM

## 2024-11-14 DIAGNOSIS — K21.00 GASTROESOPHAGEAL REFLUX DISEASE WITH ESOPHAGITIS WITHOUT HEMORRHAGE: ICD-10-CM

## 2024-11-14 DIAGNOSIS — M54.50 CHRONIC BILATERAL LOW BACK PAIN WITHOUT SCIATICA: ICD-10-CM

## 2024-11-14 DIAGNOSIS — Z23 NEED FOR VACCINATION: Primary | ICD-10-CM

## 2024-11-14 DIAGNOSIS — I10 ESSENTIAL HYPERTENSION: ICD-10-CM

## 2024-11-14 DIAGNOSIS — Z00.00 VISIT FOR ANNUAL HEALTH EXAMINATION: Primary | ICD-10-CM

## 2024-11-14 DIAGNOSIS — G89.29 CHRONIC BILATERAL LOW BACK PAIN WITHOUT SCIATICA: ICD-10-CM

## 2024-11-14 DIAGNOSIS — E78.2 MIXED HYPERLIPIDEMIA: ICD-10-CM

## 2024-11-14 PROBLEM — K62.5 RECTAL BLEEDING: Status: RESOLVED | Noted: 2020-03-20 | Resolved: 2024-11-14

## 2024-11-14 PROCEDURE — 3078F DIAST BP <80 MM HG: CPT | Mod: CPTII,S$GLB,, | Performed by: INTERNAL MEDICINE

## 2024-11-14 PROCEDURE — 99396 PREV VISIT EST AGE 40-64: CPT | Mod: S$GLB,,, | Performed by: INTERNAL MEDICINE

## 2024-11-14 PROCEDURE — 1159F MED LIST DOCD IN RCRD: CPT | Mod: CPTII,S$GLB,, | Performed by: INTERNAL MEDICINE

## 2024-11-14 PROCEDURE — 3074F SYST BP LT 130 MM HG: CPT | Mod: CPTII,S$GLB,, | Performed by: INTERNAL MEDICINE

## 2024-11-14 PROCEDURE — G0008 ADMIN INFLUENZA VIRUS VAC: HCPCS | Mod: S$GLB,,, | Performed by: INTERNAL MEDICINE

## 2024-11-14 PROCEDURE — 76770 US EXAM ABDO BACK WALL COMP: CPT | Mod: 26,,, | Performed by: INTERNAL MEDICINE

## 2024-11-14 PROCEDURE — 1160F RVW MEDS BY RX/DR IN RCRD: CPT | Mod: CPTII,S$GLB,, | Performed by: INTERNAL MEDICINE

## 2024-11-14 PROCEDURE — 76770 US EXAM ABDO BACK WALL COMP: CPT | Mod: TC

## 2024-11-14 PROCEDURE — 3044F HG A1C LEVEL LT 7.0%: CPT | Mod: CPTII,S$GLB,, | Performed by: INTERNAL MEDICINE

## 2024-11-14 PROCEDURE — 90661 CCIIV3 VAC ABX FR 0.5 ML IM: CPT | Mod: S$GLB,,, | Performed by: INTERNAL MEDICINE

## 2024-11-14 PROCEDURE — 99999 PR PBB SHADOW E&M-EST. PATIENT-LVL IV: CPT | Mod: PBBFAC,,, | Performed by: INTERNAL MEDICINE

## 2024-11-14 PROCEDURE — 3008F BODY MASS INDEX DOCD: CPT | Mod: CPTII,S$GLB,, | Performed by: INTERNAL MEDICINE

## 2024-11-14 RX ORDER — TRAMADOL HYDROCHLORIDE 50 MG/1
TABLET ORAL
Qty: 30 TABLET | Refills: 2 | Status: SHIPPED | OUTPATIENT
Start: 2024-11-14

## 2024-11-14 NOTE — PROGRESS NOTES
INTERNAL MEDICINE ESTABLISHED PATIENT VISIT NOTE    Subjective:     Chief Complaint: Annual Exam       Patient ID: Gee Montez is a 59 y.o. male with HLD, preDM now resolved, ?hx PVCs on bystolic, chronic neck and back pain, hx kidney stones, hx palpitations (seen by Cards c neg w/u including EKG, echo, and Holter), GERD, Vit D def, last seen by me in May, here today for annual exam and discuss lab results.    Was seen by ortho in  for issues with bilateral wrist and thumb pain.  Had x-rays done which showed bilateral CMC arthritis with subluxation and discuss injections but patient wanted to review pricing 1st and was told to return p.r.n..    BP and cholesterol remain diet controlled.    Past Medical History:  Past Medical History:   Diagnosis Date    Chronic neck pain     Frequent PVCs     Hyperlipidemia        Home Medications:  Prior to Admission medications    Medication Sig Start Date End Date Taking? Authorizing Provider   traMADoL (ULTRAM) 50 mg tablet TAKE 1 TABLET BY MOUTH NIGHTLY AS NEEDED FOR PAIN. 24   Celina Myles MD       Allergies:  Review of patient's allergies indicates:   Allergen Reactions    Adhes. band-tape-benzalkonium     Adhesive tape-silicones     Aspirin      Hurts stomach    Benadryl itch relief Nausea Only and Palpitations       Social History:  Social History     Tobacco Use    Smoking status: Former     Current packs/day: 0.00     Types: Cigarettes     Quit date: 3/1/1998     Years since quittin.7    Smokeless tobacco: Never   Substance Use Topics    Alcohol use: Yes     Alcohol/week: 5.0 standard drinks of alcohol     Types: 5 Glasses of wine per week     Comment: 2-3 drinks on weekend days, 5 drinks per week    Drug use: Yes        Review of Systems   Constitutional:  Negative for appetite change, chills, fatigue, fever and unexpected weight change.   HENT:  Negative for congestion, hearing loss and rhinorrhea.    Eyes:  Negative for pain and visual disturbance.  "  Respiratory:  Negative for cough, chest tightness, shortness of breath and wheezing.    Cardiovascular:  Negative for chest pain, palpitations and leg swelling.   Gastrointestinal:  Negative for abdominal distention and abdominal pain.   Endocrine: Negative for polydipsia and polyuria.   Genitourinary:  Negative for decreased urine volume, dysuria, frequency and penile discharge.   Neurological:  Negative for dizziness, weakness, numbness and headaches.   Psychiatric/Behavioral:  Negative for behavioral problems and confusion.          Health Maintenance:     Immunizations:   Influenza 11/2023, rec repeat today.  TDap 7/2017  Prevnar 13 rec at 64  Shingrix 12/2022, 3/2023  COVID vaccines completed 3/2021, 4/2021, declined booster.  Hep B x2 completed in the past, given booster at last visit since last titers showed undetectable s Ab, repeat titers done in May positive.  RSV recommended.  Pt declined today.     Cancer Screening:  Colonoscopy:  3/2020 c hemorrhoids s/p banding, no polyps, rec repeat in 10 yrs  PSA 11/2024 wnl      Objective:   /64 (BP Location: Right arm, Patient Position: Sitting)   Pulse 79   Ht 5' 10" (1.778 m)   Wt 90.4 kg (199 lb 4.7 oz)   SpO2 99%   BMI 28.60 kg/m²        General: AAO x3, no apparent distress  HEENT: no cervical LAD, no thyroid masses appreciated  CV: RRR, no m/r/g  Pulm: Lungs CTAB, no crackles, no wheezes  Abd: s/NT/ND +BS  Extremities: no c/c/e    Labs:     Lab Results   Component Value Date    WBC 5.77 11/11/2024    HGB 17.0 11/11/2024    HCT 50.8 11/11/2024    MCV 88 11/11/2024     11/11/2024     Sodium   Date Value Ref Range Status   11/11/2024 141 136 - 145 mmol/L Final     Potassium   Date Value Ref Range Status   11/11/2024 4.6 3.5 - 5.1 mmol/L Final     Chloride   Date Value Ref Range Status   11/11/2024 106 95 - 110 mmol/L Final     CO2   Date Value Ref Range Status   11/11/2024 27 23 - 29 mmol/L Final     Glucose   Date Value Ref Range Status "   11/11/2024 104 70 - 110 mg/dL Final     BUN   Date Value Ref Range Status   11/11/2024 12 6 - 20 mg/dL Final     Creatinine   Date Value Ref Range Status   11/11/2024 0.9 0.5 - 1.4 mg/dL Final     Calcium   Date Value Ref Range Status   11/11/2024 9.2 8.7 - 10.5 mg/dL Final     Total Protein   Date Value Ref Range Status   11/11/2024 7.3 6.0 - 8.4 g/dL Final     Albumin   Date Value Ref Range Status   11/11/2024 4.5 3.5 - 5.2 g/dL Final     Total Bilirubin   Date Value Ref Range Status   11/11/2024 0.7 0.1 - 1.0 mg/dL Final     Comment:     For infants and newborns, interpretation of results should be based  on gestational age, weight and in agreement with clinical  observations.    Premature Infant recommended reference ranges:  Up to 24 hours.............<8.0 mg/dL  Up to 48 hours............<12.0 mg/dL  3-5 days..................<15.0 mg/dL  6-29 days.................<15.0 mg/dL       Alkaline Phosphatase   Date Value Ref Range Status   11/11/2024 43 40 - 150 U/L Final     AST   Date Value Ref Range Status   11/11/2024 25 10 - 40 U/L Final     ALT   Date Value Ref Range Status   11/11/2024 28 10 - 44 U/L Final     Anion Gap   Date Value Ref Range Status   11/11/2024 8 8 - 16 mmol/L Final     eGFR if    Date Value Ref Range Status   03/18/2022 >60.0 >60 mL/min/1.73 m^2 Final     eGFR if non    Date Value Ref Range Status   03/18/2022 >60.0 >60 mL/min/1.73 m^2 Final     Comment:     Calculation used to obtain the estimated glomerular filtration  rate (eGFR) is the CKD-EPI equation.        Lab Results   Component Value Date    HGBA1C 5.7 (H) 11/11/2024     Lab Results   Component Value Date    LDLCALC 123.0 11/11/2024     Lab Results   Component Value Date    TSH 2.035 11/11/2024         Assessment/Plan     Gee was seen today for annual exam.    Diagnoses and all orders for this visit:    Visit for annual health examination  History and physical exam completed.  Health  maintenance reviewed as above.    Mixed hyperlipidemia  Lab Results   Component Value Date    LDLCALC 123.0 11/11/2024     Diet controlled  Cont low fat diet  Rec exercise as tolerated.  -     Lipid Panel; Future    Prediabetes  Lab Results   Component Value Date    HGBA1C 5.7 (H) 11/11/2024     Stable on recent labs.  Pt was counseled on the need to avoid intake of simple sugars and minimize carbohydrates.  Also recommended weight loss and daily exercise.  -     Comprehensive Metabolic Panel; Future  -     Hemoglobin A1C; Future    Essential hypertension  Diet controlled  No issues  Recommend low sodium diet, < 2g Na/day as well as weight loss and exercise.    Chronic bilateral low back pain without sciatica  Chronic neck pain  Stable  Okay to cont prn Tramadol  30 pills c 2 refills to last 6 mos  -     traMADoL (ULTRAM) 50 mg tablet; TAKE 1 TABLET BY MOUTH NIGHTLY AS NEEDED FOR PAIN.    Gastroesophageal reflux disease with esophagitis without hemorrhage  Stable on PPI  No issues    Nephrolithiasis  No sx currently  Pt requesting f/u u/s, okay to order  -     US Retroperitoneal Complete; Future      HM as above  RTC in 6 mos c fasting labs prior for f/u preDM and HLD, sooner if needed.  Flu shot given today.    Celina Myles MD  [facility] Internal Medicine - [facility]  11/14/2024

## 2024-12-27 ENCOUNTER — PATIENT MESSAGE (OUTPATIENT)
Dept: INTERNAL MEDICINE | Facility: CLINIC | Age: 59
End: 2024-12-27
Payer: COMMERCIAL

## 2024-12-27 DIAGNOSIS — H00.012 HORDEOLUM EXTERNUM OF RIGHT LOWER EYELID: Primary | ICD-10-CM

## 2024-12-30 RX ORDER — DOXYCYCLINE HYCLATE 100 MG
100 TABLET ORAL 2 TIMES DAILY
Qty: 14 TABLET | Refills: 0 | Status: SHIPPED | OUTPATIENT
Start: 2024-12-30 | End: 2025-01-06